# Patient Record
Sex: MALE | Race: WHITE | NOT HISPANIC OR LATINO | Employment: FULL TIME | URBAN - METROPOLITAN AREA
[De-identification: names, ages, dates, MRNs, and addresses within clinical notes are randomized per-mention and may not be internally consistent; named-entity substitution may affect disease eponyms.]

---

## 2017-12-02 ENCOUNTER — HOSPITAL ENCOUNTER (EMERGENCY)
Facility: HOSPITAL | Age: 27
Discharge: HOME | End: 2017-12-03
Attending: EMERGENCY MEDICINE | Admitting: EMERGENCY MEDICINE

## 2017-12-02 DIAGNOSIS — R41.0 CONFUSION: Primary | ICD-10-CM

## 2017-12-02 PROCEDURE — 99284 EMERGENCY DEPT VISIT MOD MDM: CPT

## 2017-12-02 RX ORDER — TESTOSTERONE CYPIONATE 200 MG/ML
INJECTION, SOLUTION INTRAMUSCULAR
COMMUNITY
End: 2017-12-15

## 2017-12-03 ENCOUNTER — APPOINTMENT (OUTPATIENT)
Dept: CT IMAGING | Facility: HOSPITAL | Age: 27
End: 2017-12-03

## 2017-12-03 VITALS
HEART RATE: 59 BPM | OXYGEN SATURATION: 95 % | RESPIRATION RATE: 18 BRPM | WEIGHT: 256 LBS | SYSTOLIC BLOOD PRESSURE: 127 MMHG | DIASTOLIC BLOOD PRESSURE: 79 MMHG | TEMPERATURE: 97.6 F | HEIGHT: 67 IN | BODY MASS INDEX: 40.18 KG/M2

## 2017-12-03 LAB
ALBUMIN SERPL-MCNC: 4.2 G/DL (ref 3.5–5.2)
ALBUMIN/GLOB SERPL: 1.7 G/DL
ALP SERPL-CCNC: 42 U/L (ref 39–117)
ALT SERPL W P-5'-P-CCNC: 19 U/L (ref 1–41)
AMPHET+METHAMPHET UR QL: NEGATIVE
ANION GAP SERPL CALCULATED.3IONS-SCNC: 10.7 MMOL/L
AST SERPL-CCNC: 16 U/L (ref 1–40)
BARBITURATES UR QL SCN: NEGATIVE
BASOPHILS # BLD AUTO: 0.14 10*3/MM3 (ref 0–0.2)
BASOPHILS NFR BLD AUTO: 1.6 % (ref 0–1.5)
BENZODIAZ UR QL SCN: NEGATIVE
BILIRUB SERPL-MCNC: 0.3 MG/DL (ref 0.1–1.2)
BILIRUB UR QL STRIP: NEGATIVE
BUN BLD-MCNC: 16 MG/DL (ref 6–20)
BUN/CREAT SERPL: 14.8 (ref 7–25)
CALCIUM SPEC-SCNC: 9 MG/DL (ref 8.6–10.5)
CANNABINOIDS SERPL QL: NEGATIVE
CHLORIDE SERPL-SCNC: 101 MMOL/L (ref 98–107)
CLARITY UR: CLEAR
CO2 SERPL-SCNC: 28.3 MMOL/L (ref 22–29)
COCAINE UR QL: NEGATIVE
COLOR UR: YELLOW
CREAT BLD-MCNC: 1.08 MG/DL (ref 0.76–1.27)
DEPRECATED RDW RBC AUTO: 43.7 FL (ref 37–54)
EOSINOPHIL # BLD AUTO: 0.39 10*3/MM3 (ref 0–0.7)
EOSINOPHIL NFR BLD AUTO: 4.4 % (ref 0.3–6.2)
ERYTHROCYTE [DISTWIDTH] IN BLOOD BY AUTOMATED COUNT: 13.1 % (ref 11.5–14.5)
GFR SERPL CREATININE-BSD FRML MDRD: 83 ML/MIN/1.73
GLOBULIN UR ELPH-MCNC: 2.5 GM/DL
GLUCOSE BLD-MCNC: 117 MG/DL (ref 65–99)
GLUCOSE UR STRIP-MCNC: NEGATIVE MG/DL
HCT VFR BLD AUTO: 44.8 % (ref 40.4–52.2)
HGB BLD-MCNC: 15 G/DL (ref 13.7–17.6)
HGB UR QL STRIP.AUTO: NEGATIVE
IMM GRANULOCYTES # BLD: 0.02 10*3/MM3 (ref 0–0.03)
IMM GRANULOCYTES NFR BLD: 0.2 % (ref 0–0.5)
INR PPP: 1.11 (ref 0.9–1.1)
KETONES UR QL STRIP: NEGATIVE
LEUKOCYTE ESTERASE UR QL STRIP.AUTO: NEGATIVE
LYMPHOCYTES # BLD AUTO: 3.03 10*3/MM3 (ref 0.9–4.8)
LYMPHOCYTES NFR BLD AUTO: 34.2 % (ref 19.6–45.3)
MCH RBC QN AUTO: 30.7 PG (ref 27–32.7)
MCHC RBC AUTO-ENTMCNC: 33.5 G/DL (ref 32.6–36.4)
MCV RBC AUTO: 91.8 FL (ref 79.8–96.2)
METHADONE UR QL SCN: NEGATIVE
MONOCYTES # BLD AUTO: 0.89 10*3/MM3 (ref 0.2–1.2)
MONOCYTES NFR BLD AUTO: 10 % (ref 5–12)
NEUTROPHILS # BLD AUTO: 4.4 10*3/MM3 (ref 1.9–8.1)
NEUTROPHILS NFR BLD AUTO: 49.6 % (ref 42.7–76)
NITRITE UR QL STRIP: NEGATIVE
OPIATES UR QL: NEGATIVE
OXYCODONE UR QL SCN: NEGATIVE
PH UR STRIP.AUTO: 7.5 [PH] (ref 5–8)
PLATELET # BLD AUTO: 205 10*3/MM3 (ref 140–500)
PMV BLD AUTO: 10.5 FL (ref 6–12)
POTASSIUM BLD-SCNC: 3.7 MMOL/L (ref 3.5–5.2)
PROT SERPL-MCNC: 6.7 G/DL (ref 6–8.5)
PROT UR QL STRIP: NEGATIVE
PROTHROMBIN TIME: 13.9 SECONDS (ref 11.7–14.2)
RBC # BLD AUTO: 4.88 10*6/MM3 (ref 4.6–6)
SODIUM BLD-SCNC: 140 MMOL/L (ref 136–145)
SP GR UR STRIP: 1.01 (ref 1–1.03)
T4 FREE SERPL-MCNC: 1.08 NG/DL (ref 0.93–1.7)
TSH SERPL DL<=0.05 MIU/L-ACNC: 4.87 MIU/ML (ref 0.27–4.2)
UROBILINOGEN UR QL STRIP: NORMAL
WBC NRBC COR # BLD: 8.87 10*3/MM3 (ref 4.5–10.7)

## 2017-12-03 PROCEDURE — 80307 DRUG TEST PRSMV CHEM ANLYZR: CPT | Performed by: EMERGENCY MEDICINE

## 2017-12-03 PROCEDURE — 84443 ASSAY THYROID STIM HORMONE: CPT | Performed by: EMERGENCY MEDICINE

## 2017-12-03 PROCEDURE — 81003 URINALYSIS AUTO W/O SCOPE: CPT | Performed by: EMERGENCY MEDICINE

## 2017-12-03 PROCEDURE — 85610 PROTHROMBIN TIME: CPT | Performed by: EMERGENCY MEDICINE

## 2017-12-03 PROCEDURE — 70450 CT HEAD/BRAIN W/O DYE: CPT

## 2017-12-03 PROCEDURE — 84439 ASSAY OF FREE THYROXINE: CPT | Performed by: EMERGENCY MEDICINE

## 2017-12-03 PROCEDURE — 85025 COMPLETE CBC W/AUTO DIFF WBC: CPT | Performed by: EMERGENCY MEDICINE

## 2017-12-03 PROCEDURE — 80053 COMPREHEN METABOLIC PANEL: CPT | Performed by: EMERGENCY MEDICINE

## 2017-12-03 NOTE — ED TRIAGE NOTES
"PT STATES HE WAS DX WITH LOW TESTOTERONE, REPORTS TODAY HE HAD TESTICLE PAIN AND ALSO REPORTS SLURRING WORDS AND \"FORGETFUL\" STARTED 1400. STATES HE MISSED TWO TURNS ON THE WAY HERE AND HE KNOWS HOW TO GET HERE.   "

## 2017-12-03 NOTE — ED PROVIDER NOTES
EMERGENCY DEPARTMENT ENCOUNTER    CHIEF COMPLAINT  Chief Complaint: speech problem  History given by: patient  History limited by: none  Room Number: 19/19  PMD: Michael Chapman MD      HPI:  Pt is a 27 y.o. male who presents complaining of intermittent slurred speech w/ testical pain that has since resolved. Pt denies testicular swelling. He has low testosterone and started injections yesterday. Denies HA, vision disturbances, changes in appetite, or hx of other endocrine related problems. He has a family hx of DM and CA.    Duration:  One day  Onset: sudden  Timing: intermittent  Location: n/a  Radiation: n/a  Quality: slurred  Intensity/Severity: moderate  Progression: resolved  Associated Symptoms: testicular pain  Aggravating Factors: none  Alleviating Factors: none  Previous Episodes: none  Treatment before arrival: none    PAST MEDICAL HISTORY  Active Ambulatory Problems     Diagnosis Date Noted   • No Active Ambulatory Problems     Resolved Ambulatory Problems     Diagnosis Date Noted   • No Resolved Ambulatory Problems     No Additional Past Medical History       PAST SURGICAL HISTORY  Past Surgical History:   Procedure Laterality Date   • ADENOIDECTOMY     • APPENDECTOMY     • TONSILLECTOMY         FAMILY HISTORY  History reviewed. No pertinent family history.    SOCIAL HISTORY  Social History     Social History   • Marital status: Single     Spouse name: N/A   • Number of children: N/A   • Years of education: N/A     Occupational History   • Not on file.     Social History Main Topics   • Smoking status: Never Smoker   • Smokeless tobacco: Not on file   • Alcohol use Yes      Comment: OCCASIONAL    • Drug use: No   • Sexual activity: Defer     Other Topics Concern   • Not on file     Social History Narrative   • No narrative on file       ALLERGIES  Review of patient's allergies indicates no known allergies.    REVIEW OF SYSTEMS  Review of Systems   Constitutional: Negative for activity change,  appetite change and fever.   HENT: Negative for congestion and sore throat.    Eyes: Negative.  Negative for visual disturbance.   Respiratory: Negative for cough and shortness of breath.    Cardiovascular: Negative for chest pain and leg swelling.   Gastrointestinal: Negative for abdominal pain, diarrhea and vomiting.   Endocrine: Negative.    Genitourinary: Positive for testicular pain. Negative for decreased urine volume and dysuria.   Musculoskeletal: Negative for neck pain.   Skin: Negative for rash and wound.   Allergic/Immunologic: Negative.    Neurological: Positive for speech difficulty. Negative for weakness, numbness and headaches.   Hematological: Negative.    Psychiatric/Behavioral: Negative.    All other systems reviewed and are negative.      PHYSICAL EXAM  ED Triage Vitals   Temp Heart Rate Resp BP SpO2   12/02/17 2330 12/02/17 2330 12/02/17 2330 12/02/17 2339 12/02/17 2330   97.5 °F (36.4 °C) 77 18 148/100 99 %      Temp src Heart Rate Source Patient Position BP Location FiO2 (%)   12/02/17 2330 12/02/17 2330 12/02/17 2339 12/02/17 2339 --   Tympanic Monitor Sitting Right arm        Physical Exam   Constitutional: He is oriented to person, place, and time and well-developed, well-nourished, and in no distress. No distress.   HENT:   Head: Normocephalic and atraumatic.   Eyes: EOM are normal. Pupils are equal, round, and reactive to light.   Neck: Normal range of motion. Neck supple.   Cardiovascular: Normal rate, regular rhythm and normal heart sounds.    Pulmonary/Chest: Effort normal and breath sounds normal. No respiratory distress.   Abdominal: Soft. There is no tenderness. There is no rebound and no guarding.   Musculoskeletal: Normal range of motion. He exhibits no edema.   Neurological: He is alert and oriented to person, place, and time. He has normal sensation and normal strength.   Skin: Skin is warm and dry.   Psychiatric: Mood and affect normal.   Nursing note and vitals  reviewed.      LAB RESULTS  Lab Results (last 24 hours)     Procedure Component Value Units Date/Time    CBC & Differential [[id number]] Collected:  12/03/17 0016    Specimen:  Blood Updated:  12/03/17 0029    Narrative:       The following orders were created for panel order CBC & Differential.  Procedure                               Abnormality         Status                     ---------                               -----------         ------                     CBC Auto Differential[[id number]]        Abnormal            Final result                 Please view results for these tests on the individual orders.    Comprehensive Metabolic Panel [[id number]]  (Abnormal) Collected:  12/03/17 0016    Specimen:  Blood Updated:  12/03/17 0048     Glucose 117 (H) mg/dL      BUN 16 mg/dL      Creatinine 1.08 mg/dL      Sodium 140 mmol/L      Potassium 3.7 mmol/L      Chloride 101 mmol/L      CO2 28.3 mmol/L      Calcium 9.0 mg/dL      Total Protein 6.7 g/dL      Albumin 4.20 g/dL      ALT (SGPT) 19 U/L      AST (SGOT) 16 U/L      Alkaline Phosphatase 42 U/L      Total Bilirubin 0.3 mg/dL      eGFR Non African Amer 83 mL/min/1.73      Globulin 2.5 gm/dL      A/G Ratio 1.7 g/dL      BUN/Creatinine Ratio 14.8     Anion Gap 10.7 mmol/L     Protime-INR [[id number]]  (Abnormal) Collected:  12/03/17 0016    Specimen:  Blood Updated:  12/03/17 0038     Protime 13.9 Seconds      INR 1.11 (H)    TSH [[id number]]  (Abnormal) Collected:  12/03/17 0016    Specimen:  Blood Updated:  12/03/17 0055     TSH 4.870 (H) mIU/mL     T4, Free [[id number]]  (Normal) Collected:  12/03/17 0016    Specimen:  Blood Updated:  12/03/17 0055     Free T4 1.08 ng/dL     Urinalysis With / Culture If Indicated - Urine, Clean Catch [[id number]]  (Normal) Collected:  12/03/17 0016    Specimen:  Urine from Urine, Clean Catch Updated:  12/03/17 0027     Color, UA Yellow     Appearance, UA Clear     pH, UA 7.5     Specific Gravity, UA 1.008     Glucose, UA Negative      Ketones, UA Negative     Bilirubin, UA Negative     Blood, UA Negative     Protein, UA Negative     Leuk Esterase, UA Negative     Nitrite, UA Negative     Urobilinogen, UA 0.2 E.U./dL    Narrative:       Urine microscopic not indicated.    Urine Drug Screen - Urine, Clean Catch [[id number]]  (Normal) Collected:  12/03/17 0016    Specimen:  Urine from Urine, Clean Catch Updated:  12/03/17 0052     Amphet/Methamphet, Screen Negative     Barbiturates Screen, Urine Negative     Benzodiazepine Screen, Urine Negative     Cocaine Screen, Urine Negative     Opiate Screen Negative     THC, Screen, Urine Negative     Methadone Screen, Urine Negative     Oxycodone Screen, Urine Negative    Narrative:       Negative Thresholds For Drugs Screened:     Amphetamines               500 ng/ml   Barbiturates               200 ng/ml   Benzodiazepines            100 ng/ml   Cocaine                    300 ng/ml   Methadone                  300 ng/ml   Opiates                    300 ng/ml   Oxycodone                  100 ng/ml   THC                        50 ng/ml    The Normal Value for all drugs tested is negative. This report includes final unconfirmed screening results to be used for medical treatment purposes only. Unconfirmed results must not be used for non-medical purposes such as employment or legal testing. Clinical consideration should be applied to any drug of abuse test, particulary when unconfirmed results are used.    CBC Auto Differential [[id number]]  (Abnormal) Collected:  12/03/17 0016    Specimen:  Blood Updated:  12/03/17 0029     WBC 8.87 10*3/mm3      RBC 4.88 10*6/mm3      Hemoglobin 15.0 g/dL      Hematocrit 44.8 %      MCV 91.8 fL      MCH 30.7 pg      MCHC 33.5 g/dL      RDW 13.1 %      RDW-SD 43.7 fl      MPV 10.5 fL      Platelets 205 10*3/mm3      Neutrophil % 49.6 %      Lymphocyte % 34.2 %      Monocyte % 10.0 %      Eosinophil % 4.4 %      Basophil % 1.6 (H) %      Immature Grans % 0.2 %       Neutrophils, Absolute 4.40 10*3/mm3      Lymphocytes, Absolute 3.03 10*3/mm3      Monocytes, Absolute 0.89 10*3/mm3      Eosinophils, Absolute 0.39 10*3/mm3      Basophils, Absolute 0.14 10*3/mm3      Immature Grans, Absolute 0.02 10*3/mm3           I ordered the above labs and reviewed the results    RADIOLOGY  CT Head Without Contrast   Preliminary Result   No definite acute intracranial pathology.                           I ordered the above noted radiological studies. Interpreted by radiologist. Reviewed by me in PACS.       PROCEDURES  Procedures      PROGRESS AND CONSULTS  ED Course   0001  CBC, UDS, UA, T4, TSH, Protime-INR, CMP, CT head ordered    0105  BP- 127/79 HR- 64 Temp- 97.5 °F (36.4 °C) (Tympanic) O2 sat- 96%  Rechecked the patient who is in NAD and is resting comfortably. Discussed imaging being unremarkable and mild elevation in the TSH. He was counseled on the need to follow up with the PCP about his sx due to possible medication side effects. He was also advised to follow up with additional thyroid studies.     MEDICAL DECISION MAKING  Results were reviewed/discussed with the patient and they were also made aware of online access. Pt also made aware that some labs, such as cultures, will not be resulted during ER visit and follow up with PMD is necessary.     MDM  Number of Diagnoses or Management Options     Amount and/or Complexity of Data Reviewed  Clinical lab tests: reviewed and ordered (TSH 4.870)  Tests in the radiology section of CPT®: reviewed and ordered (CT head-No definite acute intracranial pathology.)           DIAGNOSIS  Final diagnoses:   Confusion       DISPOSITION  DISCHARGE    Patient discharged in stable condition.    Reviewed implications of results, diagnosis, meds, responsibility to follow up, warning signs and symptoms of possible worsening, potential complications and reasons to return to ER.    Patient/Family voiced understanding of above instructions.    Discussed plan  for discharge, as there is no emergent indication for admission.  Pt/family is agreeable and understands need for follow up and repeat testing.  Pt is aware that discharge does not mean that nothing is wrong but it indicates no emergency is present that requires admission and they must continue care with follow-up as given below or physician of their choice.     FOLLOW-UP  Michael Chapman MD  [address]  [address]  [facility] [place] [address]  [phone]    Call           Medication List      Notice     No changes were made to your prescriptions during this visit.            Latest Documented Vital Signs:  As of 1:12 AM  BP- 127/79 HR- 64 Temp- 97.5 °F (36.4 °C) (Tympanic) O2 sat- 96%    --  Documentation assistance provided by mk Lindquist for Dr. Olmedo.  Information recorded by the scribe was done at my direction and has been verified and validated by me.     Radha Lindquist  12/03/17 0112       Mukund Olmedo MD  12/03/17 0619

## 2017-12-15 ENCOUNTER — OFFICE VISIT (OUTPATIENT)
Dept: INTERNAL MEDICINE | Age: 27
End: 2017-12-15

## 2017-12-15 VITALS
DIASTOLIC BLOOD PRESSURE: 70 MMHG | HEART RATE: 79 BPM | BODY MASS INDEX: 35.98 KG/M2 | OXYGEN SATURATION: 99 % | TEMPERATURE: 98.5 F | HEIGHT: 71 IN | WEIGHT: 257 LBS | SYSTOLIC BLOOD PRESSURE: 120 MMHG

## 2017-12-15 DIAGNOSIS — E53.8 VITAMIN B 12 DEFICIENCY: Chronic | ICD-10-CM

## 2017-12-15 DIAGNOSIS — F90.9 ATTENTION DEFICIT HYPERACTIVITY DISORDER (ADHD), UNSPECIFIED ADHD TYPE: Chronic | ICD-10-CM

## 2017-12-15 DIAGNOSIS — R53.83 FATIGUE, UNSPECIFIED TYPE: ICD-10-CM

## 2017-12-15 DIAGNOSIS — R79.89 LOW TESTOSTERONE IN MALE: Primary | Chronic | ICD-10-CM

## 2017-12-15 PROBLEM — H35.52 RETINITIS PIGMENTOSA OF BOTH EYES: Chronic | Status: ACTIVE | Noted: 2017-12-15

## 2017-12-15 PROBLEM — A60.00 GENITAL HERPES: Status: ACTIVE | Noted: 2017-12-15

## 2017-12-15 PROCEDURE — 99204 OFFICE O/P NEW MOD 45 MIN: CPT | Performed by: INTERNAL MEDICINE

## 2017-12-15 RX ORDER — ACYCLOVIR 200 MG/1
CAPSULE ORAL
COMMUNITY
Start: 2013-12-23 | End: 2019-01-09 | Stop reason: SDUPTHER

## 2017-12-15 RX ORDER — TESTOSTERONE CYPIONATE 200 MG/ML
200 INJECTION, SOLUTION INTRAMUSCULAR
Refills: 0 | COMMUNITY
Start: 2017-12-15 | End: 2017-12-29 | Stop reason: SDUPTHER

## 2017-12-16 ENCOUNTER — LAB (OUTPATIENT)
Dept: LAB | Facility: HOSPITAL | Age: 27
End: 2017-12-16

## 2017-12-16 DIAGNOSIS — R53.83 FATIGUE, UNSPECIFIED TYPE: Primary | ICD-10-CM

## 2017-12-16 LAB
ALBUMIN SERPL-MCNC: 4.3 G/DL (ref 3.5–5.2)
ALBUMIN/GLOB SERPL: 1.7 G/DL
ALP SERPL-CCNC: 40 U/L (ref 39–117)
ALT SERPL W P-5'-P-CCNC: 23 U/L (ref 1–41)
ANION GAP SERPL CALCULATED.3IONS-SCNC: 10.9 MMOL/L
AST SERPL-CCNC: 22 U/L (ref 1–40)
BASOPHILS # BLD AUTO: 0.15 10*3/MM3 (ref 0–0.2)
BASOPHILS NFR BLD AUTO: 1.8 % (ref 0–1.5)
BILIRUB SERPL-MCNC: 0.4 MG/DL (ref 0.1–1.2)
BILIRUB UR QL STRIP: NEGATIVE
BUN BLD-MCNC: 14 MG/DL (ref 6–20)
BUN/CREAT SERPL: 12.3 (ref 7–25)
CALCIUM SPEC-SCNC: 9.3 MG/DL (ref 8.6–10.5)
CHLORIDE SERPL-SCNC: 102 MMOL/L (ref 98–107)
CLARITY UR: CLEAR
CO2 SERPL-SCNC: 27.1 MMOL/L (ref 22–29)
COLOR UR: YELLOW
CREAT BLD-MCNC: 1.14 MG/DL (ref 0.76–1.27)
DEPRECATED RDW RBC AUTO: 44.9 FL (ref 37–54)
EOSINOPHIL # BLD AUTO: 0.35 10*3/MM3 (ref 0–0.7)
EOSINOPHIL NFR BLD AUTO: 4.3 % (ref 0.3–6.2)
ERYTHROCYTE [DISTWIDTH] IN BLOOD BY AUTOMATED COUNT: 13.4 % (ref 11.5–14.5)
FSH SERPL-ACNC: 0.27 MIU/ML
GFR SERPL CREATININE-BSD FRML MDRD: 78 ML/MIN/1.73
GLOBULIN UR ELPH-MCNC: 2.6 GM/DL
GLUCOSE BLD-MCNC: 88 MG/DL (ref 65–99)
GLUCOSE UR STRIP-MCNC: NEGATIVE MG/DL
HCT VFR BLD AUTO: 49.6 % (ref 40.4–52.2)
HGB BLD-MCNC: 16.6 G/DL (ref 13.7–17.6)
HGB UR QL STRIP.AUTO: NEGATIVE
HIV1 P24 AG SER QL: NORMAL
HIV1+2 AB SER QL: NORMAL
IMM GRANULOCYTES # BLD: 0.04 10*3/MM3 (ref 0–0.03)
IMM GRANULOCYTES NFR BLD: 0.5 % (ref 0–0.5)
KETONES UR QL STRIP: NEGATIVE
LEUKOCYTE ESTERASE UR QL STRIP.AUTO: NEGATIVE
LH SERPL-ACNC: 0.17 MIU/ML
LYMPHOCYTES # BLD AUTO: 2.39 10*3/MM3 (ref 0.9–4.8)
LYMPHOCYTES NFR BLD AUTO: 29.2 % (ref 19.6–45.3)
MCH RBC QN AUTO: 31 PG (ref 27–32.7)
MCHC RBC AUTO-ENTMCNC: 33.5 G/DL (ref 32.6–36.4)
MCV RBC AUTO: 92.7 FL (ref 79.8–96.2)
MONOCYTES # BLD AUTO: 0.45 10*3/MM3 (ref 0.2–1.2)
MONOCYTES NFR BLD AUTO: 5.5 % (ref 5–12)
NEUTROPHILS # BLD AUTO: 4.81 10*3/MM3 (ref 1.9–8.1)
NEUTROPHILS NFR BLD AUTO: 58.7 % (ref 42.7–76)
NITRITE UR QL STRIP: NEGATIVE
PH UR STRIP.AUTO: 7 [PH] (ref 5–8)
PLATELET # BLD AUTO: 217 10*3/MM3 (ref 140–500)
PMV BLD AUTO: 10.3 FL (ref 6–12)
POTASSIUM BLD-SCNC: 4.4 MMOL/L (ref 3.5–5.2)
PROLACTIN SERPL-MCNC: 11.69 NG/ML (ref 4.04–15.2)
PROT SERPL-MCNC: 6.9 G/DL (ref 6–8.5)
PROT UR QL STRIP: NEGATIVE
RBC # BLD AUTO: 5.35 10*6/MM3 (ref 4.6–6)
SODIUM BLD-SCNC: 140 MMOL/L (ref 136–145)
SP GR UR STRIP: 1.01 (ref 1–1.03)
T4 FREE SERPL-MCNC: 1.15 NG/DL (ref 0.93–1.7)
TESTOST SERPL-MCNC: 582.2 NG/DL (ref 249–836)
TSH SERPL DL<=0.05 MIU/L-ACNC: 1.64 MIU/ML (ref 0.27–4.2)
UROBILINOGEN UR QL STRIP: NORMAL
WBC NRBC COR # BLD: 8.19 10*3/MM3 (ref 4.5–10.7)

## 2017-12-16 PROCEDURE — 86664 EPSTEIN-BARR NUCLEAR ANTIGEN: CPT | Performed by: INTERNAL MEDICINE

## 2017-12-16 PROCEDURE — 84146 ASSAY OF PROLACTIN: CPT | Performed by: INTERNAL MEDICINE

## 2017-12-16 PROCEDURE — 84270 ASSAY OF SEX HORMONE GLOBUL: CPT

## 2017-12-16 PROCEDURE — 87899 AGENT NOS ASSAY W/OPTIC: CPT | Performed by: INTERNAL MEDICINE

## 2017-12-16 PROCEDURE — 36415 COLL VENOUS BLD VENIPUNCTURE: CPT

## 2017-12-16 PROCEDURE — 83002 ASSAY OF GONADOTROPIN (LH): CPT | Performed by: INTERNAL MEDICINE

## 2017-12-16 PROCEDURE — 84439 ASSAY OF FREE THYROXINE: CPT | Performed by: INTERNAL MEDICINE

## 2017-12-16 PROCEDURE — 84402 ASSAY OF FREE TESTOSTERONE: CPT

## 2017-12-16 PROCEDURE — 80307 DRUG TEST PRSMV CHEM ANLYZR: CPT | Performed by: INTERNAL MEDICINE

## 2017-12-16 PROCEDURE — 81003 URINALYSIS AUTO W/O SCOPE: CPT | Performed by: INTERNAL MEDICINE

## 2017-12-16 PROCEDURE — 84403 ASSAY OF TOTAL TESTOSTERONE: CPT

## 2017-12-16 PROCEDURE — G0432 EIA HIV-1/HIV-2 SCREEN: HCPCS | Performed by: INTERNAL MEDICINE

## 2017-12-16 PROCEDURE — 83001 ASSAY OF GONADOTROPIN (FSH): CPT | Performed by: INTERNAL MEDICINE

## 2017-12-16 PROCEDURE — 86665 EPSTEIN-BARR CAPSID VCA: CPT | Performed by: INTERNAL MEDICINE

## 2017-12-16 PROCEDURE — 84443 ASSAY THYROID STIM HORMONE: CPT | Performed by: INTERNAL MEDICINE

## 2017-12-16 PROCEDURE — G0483 DRUG TEST DEF 22+ CLASSES: HCPCS | Performed by: INTERNAL MEDICINE

## 2017-12-16 PROCEDURE — 85025 COMPLETE CBC W/AUTO DIFF WBC: CPT | Performed by: INTERNAL MEDICINE

## 2017-12-16 PROCEDURE — 80053 COMPREHEN METABOLIC PANEL: CPT | Performed by: INTERNAL MEDICINE

## 2017-12-17 LAB — SHBG SERPL-SCNC: 13.9 NMOL/L (ref 16.5–55.9)

## 2017-12-17 NOTE — ASSESSMENT & PLAN NOTE
Will check levels Saturday morning. Will include labs to evaluate for secondary hypogonadism. He will hold Depo-Testosterone shot until labs are drawn.

## 2017-12-17 NOTE — PROGRESS NOTES
"[facility] [place] [facility]  KRISTI BRAVO M.D.      Shaheen Rivera / 27 y.o. / male  12/15/2017    VITALS:    Visit Vitals   • /70 (BP Location: Left arm, Patient Position: Sitting, Cuff Size: Large Adult)   • Pulse 79   • Temp 98.5 °F (36.9 °C)   • Ht 180.3 cm (71\")   • Wt 117 kg (257 lb)   • SpO2 99%   • BMI 35.84 kg/m2       BP Readings from Last 3 Encounters:   12/15/17 120/70   12/03/17 127/79     Wt Readings from Last 3 Encounters:   12/15/17 117 kg (257 lb)   12/02/17 116 kg (256 lb)      Body mass index is 35.84 kg/(m^2).    CC: Main reason(s) for today's visit: Establish Care      HPI:    Patient is a 27 y.o. male who is here to establish care.   Primary complaints regards significant fatigue and feeling of generalized weakness. He has been lifting weights for many years but denies extensive use of androgen boosters. He has been on testosterone replacement initially with topical agent but then was switched to Depo-Testosterone injections. He reports to using weekly injections. He complains of not feeling his usual self with fatigue and his muscles do not feel strong which is a noticeable departure from norm. He was diagnosed with ADHD as a child but never medicated until college but he decided to stop on his own July 2017. He states his GF has noted he is more irritable / agitated when he is not taking the medication. He has been on B12 shots for B12 deficiency. He denies overt symptoms of depression.     Patient Care Team:  Lico Bravo MD as PCP - General (Internal Medicine)  ____________________________________________________________________    ASSESSMENT & PLAN:    Problem List Items Addressed This Visit     Low testosterone in male - Primary (Chronic)    Relevant Orders    FSH & LH (Completed)    Prolactin (Completed)    Compliance Drug Analysis, Ur - Urine, Clean Catch      Other Visit Diagnoses     Fatigue, unspecified type        Relevant Orders    Comprehensive Metabolic Panel (Completed)    CBC & " Differential (Completed)    Urinalysis With / Microscopic If Indicated - Urine, Clean Catch (Completed)    TSH+Free T4 (Completed)    EBVCA(IgG+IgM)+EBVNIg    HIV-1 / O / 2 Ag / Antibody 4th Generation (Completed)    CBC Auto Differential (Completed)           Summary/Discussion:     · Spent 50 minutes in face-to-face encounter time and >50% of time spent in discussion/counseling regarding above medical problems/issues.        Return in about 2 weeks (around 12/29/2017) for Next scheduled follow up.    Future Appointments  Date Time Provider Department Center   12/29/2017 4:40 PM MD [facility] None       ____________________________________________________________________        REVIEW OF SYSTEMS    Review of Systems   Constitutional: Positive for fatigue. Negative for unexpected weight change.   HENT: Negative.    Eyes: Negative.    Respiratory: Negative.  Apnea: snores.    Cardiovascular: Negative.  Negative for chest pain.   Gastrointestinal: Negative.    Endocrine: Negative.         Low testosterone  Decreased libido   Genitourinary: Negative.    Musculoskeletal: Negative.    Skin: Negative.    Allergic/Immunologic: Negative.    Neurological: Negative.    Hematological: Negative.    Psychiatric/Behavioral: Positive for agitation, decreased concentration (h/o ADHD) and dysphoric mood. Negative for suicidal ideas.         PHYSICAL EXAMINATION    Physical Exam   Constitutional: He appears well-nourished. No distress.   Overweight     HENT:   Head: Normocephalic.   Right Ear: Tympanic membrane normal.   Left Ear: Tympanic membrane normal.   Mouth/Throat: Oropharynx is clear and moist. No oral lesions.   Eyes: Conjunctivae and EOM are normal. Pupils are equal, round, and reactive to light. No scleral icterus.   Neck: Neck supple. No tracheal deviation present. No thyromegaly present.   Cardiovascular: Normal rate, regular rhythm, normal heart sounds and intact distal pulses.  Exam reveals no gallop and  no friction rub.    No murmur heard.  Pulmonary/Chest: Effort normal and breath sounds normal.   Abdominal: Soft. Bowel sounds are normal. He exhibits no distension and no mass. There is no tenderness. No hernia.   Musculoskeletal: He exhibits no edema or deformity.   Lymphadenopathy:     He has no cervical adenopathy.        Right: No supraclavicular adenopathy present.        Left: No supraclavicular adenopathy present.   Neurological: He is alert. He has normal reflexes. He exhibits normal muscle tone.   Skin: Skin is intact. No rash noted. No erythema. No pallor.   No jaundice   Psychiatric: Judgment and thought content normal. Anxious: concerned. His speech is delayed. He is slowed. He is not agitated. Cognition and memory are normal. He exhibits a depressed mood (moderately dysphorc). He expresses no suicidal ideation.       REVIEWED DATA:    Labs:        Imaging:        Medical Tests:        Summary of old records / correspondence / consultant report:        Request outside records:        ______________________________________________________________________    ALLERGIES  No Known Allergies     MEDICATIONS  Current Outpatient Prescriptions   Medication Sig Dispense Refill   • acyclovir (ZOVIRAX) 200 MG capsule TAKE ONE CAPSULE BY MOUTH EVERY 4 HOURS     • Cyanocobalamin (B-12 COMPLIANCE INJECTION) 1000 MCG/ML kit Inject  as directed.     • FOLIC ACID PO Take  by mouth.     • Testosterone Cypionate (DEPO-TESTOSTERONE) 200 MG/ML injection Inject 1 mL into the shoulder, thigh, or buttocks Every 28 (Twenty-Eight) Days.  0     No current facility-administered medications for this visit.        PFSH:     The following portions of the patient's history were reviewed and updated as appropriate: Allergies / Current Medications / Past Medical History / Surgical History / Social History / Family History    PROBLEM LIST   Patient Active Problem List   Diagnosis   • Vitamin B 12 deficiency   • Low testosterone in male    • Retinitis pigmentosa of both eyes   • ADHD (attention deficit hyperactivity disorder)   • Genital herpes       PAST MEDICAL HISTORY  Past Medical History:   Diagnosis Date   • ADHD    • HSV-2 (herpes simplex virus 2) infection    • Low testosterone in male    • Vitamin B 12 deficiency        SURGICAL HISTORY  Past Surgical History:   Procedure Laterality Date   • ADENOIDECTOMY     • APPENDECTOMY     • TONSILLECTOMY         SOCIAL HISTORY  Social History     Social History   • Marital status: Single     Spouse name: has a fiance   • Number of children: 0   • Years of education: N/A     Occupational History   • 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[employer])      Social History Main Topics   • Smoking status: Never Smoker   • Smokeless tobacco: Never Used   • Alcohol use Yes      Comment: OCCASIONAL    • Drug use: No      Comment: former casual marijuana use (more than 2 years ago)   • Sexual activity: Not Currently     Other Topics Concern   • None     Social History Narrative   • None       FAMILY HISTORY  Family History   Problem Relation Age of Onset   • Lung cancer Mother 49     smoker   • Graves' disease Mother    • Lung cancer Father 52     smoker   • No Known Problems Brother    • Coronary artery disease Paternal Grandmother    • Thyroid disease Paternal Grandmother    • Suicidality Maternal Uncle      committed suicide         **Daly Disclaimer:   Much of this encounter note is an electronic transcription/translation of spoken language to printed text. The electronic translation of spoken language may permit erroneous, or at times, nonsensical words or phrases to be inadvertently transcribed. Although I have reviewed the note for such errors, some may still exist.

## 2017-12-18 LAB
EBV NA IGG SER IA-ACNC: 129 U/ML (ref 0–17.9)
EBV VCA IGG SER-ACNC: 550 U/ML (ref 0–17.9)
EBV VCA IGM SER-ACNC: <36 U/ML (ref 0–35.9)
INTERPRETATION: ABNORMAL

## 2017-12-19 ENCOUNTER — TELEPHONE (OUTPATIENT)
Dept: INTERNAL MEDICINE | Age: 27
End: 2017-12-19

## 2017-12-19 LAB — TESTOST FREE SERPL-MCNC: 24.7 PG/ML (ref 9.3–26.5)

## 2017-12-20 NOTE — TELEPHONE ENCOUNTER
Discussed w/ him re: labs. Low fsh/lh is probably related to exogenous testosterone. He may resume testosterone injections or hold at this time. He was told to call if he any acute worsening of his symptoms. He expressed understanding and agreed to follow above instructions.

## 2017-12-22 LAB — CONV REPORT SUMMARY: NORMAL

## 2017-12-29 ENCOUNTER — OFFICE VISIT (OUTPATIENT)
Dept: INTERNAL MEDICINE | Age: 27
End: 2017-12-29

## 2017-12-29 VITALS
WEIGHT: 260 LBS | SYSTOLIC BLOOD PRESSURE: 124 MMHG | TEMPERATURE: 98.4 F | DIASTOLIC BLOOD PRESSURE: 72 MMHG | HEIGHT: 71 IN | HEART RATE: 72 BPM | OXYGEN SATURATION: 99 % | BODY MASS INDEX: 36.4 KG/M2

## 2017-12-29 DIAGNOSIS — R53.83 FATIGUE, UNSPECIFIED TYPE: ICD-10-CM

## 2017-12-29 DIAGNOSIS — Z00.00 PREVENTATIVE HEALTH CARE: Primary | ICD-10-CM

## 2017-12-29 DIAGNOSIS — R79.89 LOW TESTOSTERONE IN MALE: Primary | Chronic | ICD-10-CM

## 2017-12-29 PROCEDURE — 90686 IIV4 VACC NO PRSV 0.5 ML IM: CPT | Performed by: INTERNAL MEDICINE

## 2017-12-29 PROCEDURE — 99213 OFFICE O/P EST LOW 20 MIN: CPT | Performed by: INTERNAL MEDICINE

## 2017-12-29 PROCEDURE — 90471 IMMUNIZATION ADMIN: CPT | Performed by: INTERNAL MEDICINE

## 2017-12-29 RX ORDER — SYRINGE W-NEEDLE,DISPOSAB,3 ML 25GX5/8"
SYRINGE, EMPTY DISPOSABLE MISCELLANEOUS
Qty: 50 EACH | Refills: 0 | Status: SHIPPED | OUTPATIENT
Start: 2017-12-29 | End: 2018-03-28

## 2017-12-29 RX ORDER — TESTOSTERONE CYPIONATE 200 MG/ML
200 INJECTION, SOLUTION INTRAMUSCULAR WEEKLY
Qty: 4 ML | Refills: 0 | Status: SHIPPED | OUTPATIENT
Start: 2017-12-29 | End: 2018-01-24 | Stop reason: SDUPTHER

## 2017-12-29 NOTE — ASSESSMENT & PLAN NOTE
Feeling better. Continue testosterone injection weekly x 1 month then will recheck levels. Referral to endo for further management as he is a young male with plans to  and have children at some point. Rx printed for testosterone 1 mL x 4, no refill. Consent / agreement signed. Brown requested. UDS reviewed.

## 2017-12-29 NOTE — PROGRESS NOTES
"[facility] [place] INTERNAL MEDICINE  KRISTI BRAVO M.D.      Shaheen Gilletteoney / 27 y.o. / male  12/29/2017      MEDICATIONS  Current Outpatient Prescriptions   Medication Sig Dispense Refill   • acyclovir (ZOVIRAX) 200 MG capsule TAKE ONE CAPSULE BY MOUTH EVERY 4 HOURS     • Cyanocobalamin (B-12 COMPLIANCE INJECTION) 1000 MCG/ML kit Inject  as directed.     • FOLIC ACID PO Take  by mouth.     • Testosterone Cypionate (DEPO-TESTOSTERONE) 200 MG/ML injection Inject 1 mL into the shoulder, thigh, or buttocks 1 (One) Time Per Week. 4 mL 0   • Syringe 23G X 1\" 3 ML misc Use weekly for testosterone injection 50 each 0     No current facility-administered medications for this visit.          VITALS:    Visit Vitals   • /72   • Pulse 72   • Temp 98.4 °F (36.9 °C)   • Ht 180.3 cm (70.98\")   • Wt 118 kg (260 lb)   • SpO2 99%   • BMI 36.28 kg/m2       BP Readings from Last 3 Encounters:   12/29/17 124/72   12/15/17 120/70   12/03/17 127/79     Wt Readings from Last 3 Encounters:   12/29/17 118 kg (260 lb)   12/15/17 117 kg (257 lb)   12/02/17 116 kg (256 lb)      Body mass index is 36.28 kg/(m^2).    CC:  Main reason(s) for today's visit: Low testerone (2 week F/U)      HPI:     Overall feel better. Resumed testosterone injection weekly at 200 mg/mL. C/o low libido, has a fiance and plans to  and have children at some point. Previously stopped taking Vyvanse abruptly which contributed to severe fatigue symtpoms. All other labs including thyroid function, cbc were fine.     Patient Care Team:  Lico Bravo MD as PCP - General (Internal Medicine)  ____________________________________________________________________    ASSESSMENT & PLAN:    Problem List Items Addressed This Visit     Low testosterone in male - Primary (Chronic)    Current Assessment & Plan     Feeling better. Continue testosterone injection weekly x 1 month then will recheck levels. Referral to endo for further management as he is a young male with plans to  and " "have children at some point. Rx printed for testosterone 1 mL x 4, no refill. Consent / agreement signed. Brown requested. UDS reviewed.          Relevant Medications    Testosterone Cypionate (DEPO-TESTOSTERONE) 200 MG/ML injection    Syringe 23G X 1\" 3 ML misc    Other Relevant Orders    Ambulatory Referral to Endocrinology    Fatigue    Current Assessment & Plan     Improving. Suspect related to combination of low testosterone and stopping Vyvanse abruptly.              Orders Placed This Encounter   Procedures   • Ambulatory Referral to Endocrinology       Summary/Discussion:  ·     Return in about 2 months (around 2/28/2018) for Low testosterone.    Future Appointments  Date Time Provider Department Center   2/28/2018 2:40 PM MD [facility] None       ____________________________________________________________________    REVIEW OF SYSTEMS    Review of Systems  Constitutional decreased fatigue  Resp neg  CV neg  Poor libido  No mood worsening, SI, agitation      PHYSICAL EXAMINATION    Physical Exam  Constitutional  No distress, looks improved  Psychiatric  Alert. Judgment and thought content normal. Mood normal    REVIEWED DATA:    Labs:     Lab on 12/16/2017   Component Date Value Ref Range Status   • Testosterone, Total 12/16/2017 582.20  249.00 - 836.00 ng/dL Final   • Testosterone, Free 12/16/2017 24.7  9.3 - 26.5 pg/mL Final   • Sex Hormone Binding Globulin 12/16/2017 13.9* 16.5 - 55.9 nmol/L Final   Office Visit on 12/15/2017   Component Date Value Ref Range Status   • Glucose 12/16/2017 88  65 - 99 mg/dL Final   • BUN 12/16/2017 14  6 - 20 mg/dL Final   • Creatinine 12/16/2017 1.14  0.76 - 1.27 mg/dL Final   • Sodium 12/16/2017 140  136 - 145 mmol/L Final   • Potassium 12/16/2017 4.4  3.5 - 5.2 mmol/L Final   • Chloride 12/16/2017 102  98 - 107 mmol/L Final   • CO2 12/16/2017 27.1  22.0 - 29.0 mmol/L Final   • Calcium 12/16/2017 9.3  8.6 - 10.5 mg/dL Final   • Total Protein 12/16/2017 6.9  " 6.0 - 8.5 g/dL Final   • Albumin 12/16/2017 4.30  3.50 - 5.20 g/dL Final   • ALT (SGPT) 12/16/2017 23  1 - 41 U/L Final   • AST (SGOT) 12/16/2017 22  1 - 40 U/L Final   • Alkaline Phosphatase 12/16/2017 40  39 - 117 U/L Final   • Total Bilirubin 12/16/2017 0.4  0.1 - 1.2 mg/dL Final   • eGFR Non African Amer 12/16/2017 78  >60 mL/min/1.73 Final   • Globulin 12/16/2017 2.6  gm/dL Final   • A/G Ratio 12/16/2017 1.7  g/dL Final   • BUN/Creatinine Ratio 12/16/2017 12.3  7.0 - 25.0 Final   • Anion Gap 12/16/2017 10.9  mmol/L Final   • Color, UA 12/16/2017 Yellow  Yellow, Straw Final   • Appearance, UA 12/16/2017 Clear  Clear Final   • pH, UA 12/16/2017 7.0  5.0 - 8.0 Final   • Specific Gravity, UA 12/16/2017 1.007  1.005 - 1.030 Final   • Glucose, UA 12/16/2017 Negative  Negative Final   • Ketones, UA 12/16/2017 Negative  Negative Final   • Bilirubin, UA 12/16/2017 Negative  Negative Final   • Blood, UA 12/16/2017 Negative  Negative Final   • Protein, UA 12/16/2017 Negative  Negative Final   • Leuk Esterase, UA 12/16/2017 Negative  Negative Final   • Nitrite, UA 12/16/2017 Negative  Negative Final   • Urobilinogen, UA 12/16/2017 0.2 E.U./dL  0.2 - 1.0 E.U./dL Final   • TSH 12/16/2017 1.640  0.270 - 4.200 mIU/mL Final   • Free T4 12/16/2017 1.15  0.93 - 1.70 ng/dL Final   • FSH 12/16/2017 0.27  mIU/mL Final   • LH 12/16/2017 0.17  mIU/mL Final   • Prolactin 12/16/2017 11.69  4.04 - 15.20 ng/mL Final   • Report Summary 12/16/2017 FINAL   Final    ====================================================================  [facility] COMP DRUG ANALYSIS,[facility]  ====================================================================  Test                             Result       Flag       Units    NO DRUGS DETECTED.  ====================================================================  Test                      Result    Flag   Units      Ref Range    Creatinine              33               mg/dL       >=20  ====================================================================  Declared Medications:   Medication list was not provided.  ====================================================================  For clinical consultation, please call [phone].  ====================================================================   • EBV VCA IgM 12/16/2017 <36.0  0.0 - 35.9 U/mL Final                                     Negative        <36.0                                   Equivocal 36.0 - 43.9                                   Positive        >43.9   • EBV VCA IgG 12/16/2017 550.0* 0.0 - 17.9 U/mL Final                                     Negative        <18.0                                   Equivocal 18.0 - 21.9                                   Positive        >21.9   • EBV Nuclear Antigen Ab, IgG 12/16/2017 129.0* 0.0 - 17.9 U/mL Final                                     Negative        <18.0                                   Equivocal 18.0 - 21.9                                   Positive        >21.9   • Interpretation 12/16/2017 Comment   Final                   EBV Interpretation Chart  Interpretation   EBV-IgM  EA(D)-IgG  VCA-IgG  EBNA-IgG  EBV Seronegative    -        -         -          -  Early Phase         +        -         -          -  Acute Primary       +       +or-       +          -  Infection  Convalescence/Past  -       +or-       +          +  Infection  Reactivated        +or-      +         +          +  Infection         + Antibody Present      - Antibody Absent   • HIV-1/ HIV-2 12/16/2017 Non-Reactive  Non-Reactive Final   • HIV-1 P24 Ag Screen 12/16/2017 Non-Reactive  Non-Reactive Final   • WBC 12/16/2017 8.19  4.50 - 10.70 10*3/mm3 Final   • RBC 12/16/2017 5.35  4.60 - 6.00 10*6/mm3 Final   • Hemoglobin 12/16/2017 16.6  13.7 - 17.6 g/dL Final   • Hematocrit 12/16/2017 49.6  40.4 - 52.2 % Final   • MCV 12/16/2017 92.7  79.8 - 96.2 fL Final   • MCH 12/16/2017 31.0  27.0 - 32.7 pg Final    • MCHC 12/16/2017 33.5  32.6 - 36.4 g/dL Final   • RDW 12/16/2017 13.4  11.5 - 14.5 % Final   • RDW-SD 12/16/2017 44.9  37.0 - 54.0 fl Final   • MPV 12/16/2017 10.3  6.0 - 12.0 fL Final   • Platelets 12/16/2017 217  140 - 500 10*3/mm3 Final   Admission on 12/02/2017, Discharged on 12/03/2017   Component Date Value Ref Range Status   • Glucose 12/03/2017 117* 65 - 99 mg/dL Final   • BUN 12/03/2017 16  6 - 20 mg/dL Final   • Creatinine 12/03/2017 1.08  0.76 - 1.27 mg/dL Final   • Sodium 12/03/2017 140  136 - 145 mmol/L Final   • Potassium 12/03/2017 3.7  3.5 - 5.2 mmol/L Final   • Chloride 12/03/2017 101  98 - 107 mmol/L Final   • CO2 12/03/2017 28.3  22.0 - 29.0 mmol/L Final   • Calcium 12/03/2017 9.0  8.6 - 10.5 mg/dL Final   • Total Protein 12/03/2017 6.7  6.0 - 8.5 g/dL Final   • Albumin 12/03/2017 4.20  3.50 - 5.20 g/dL Final   • ALT (SGPT) 12/03/2017 19  1 - 41 U/L Final   • AST (SGOT) 12/03/2017 16  1 - 40 U/L Final   • Alkaline Phosphatase 12/03/2017 42  39 - 117 U/L Final   • Total Bilirubin 12/03/2017 0.3  0.1 - 1.2 mg/dL Final   • eGFR Non African Amer 12/03/2017 83  >60 mL/min/1.73 Final   • Globulin 12/03/2017 2.5  gm/dL Final   • A/G Ratio 12/03/2017 1.7  g/dL Final   • BUN/Creatinine Ratio 12/03/2017 14.8  7.0 - 25.0 Final   • Anion Gap 12/03/2017 10.7  mmol/L Final   • Protime 12/03/2017 13.9  11.7 - 14.2 Seconds Final   • INR 12/03/2017 1.11* 0.90 - 1.10 Final   • TSH 12/03/2017 4.870* 0.270 - 4.200 mIU/mL Final   • Free T4 12/03/2017 1.08  0.93 - 1.70 ng/dL Final   • Color, UA 12/03/2017 Yellow  Yellow, Straw Final   • Appearance, UA 12/03/2017 Clear  Clear Final   • pH, UA 12/03/2017 7.5  5.0 - 8.0 Final   • Specific Gravity, UA 12/03/2017 1.008  1.005 - 1.030 Final   • Glucose, UA 12/03/2017 Negative  Negative Final   • Ketones, UA 12/03/2017 Negative  Negative Final   • Bilirubin, UA 12/03/2017 Negative  Negative Final   • Blood, UA 12/03/2017 Negative  Negative Final   • Protein, UA 12/03/2017  Negative  Negative Final   • Leuk Esterase, UA 12/03/2017 Negative  Negative Final   • Nitrite, UA 12/03/2017 Negative  Negative Final   • Urobilinogen, UA 12/03/2017 0.2 E.U./dL  0.2 - 1.0 E.U./dL Final   • Amphet/Methamphet, Screen 12/03/2017 Negative  Negative Final   • Barbiturates Screen, Urine 12/03/2017 Negative  Negative Final   • Benzodiazepine Screen, Urine 12/03/2017 Negative  Negative Final   • Cocaine Screen, Urine 12/03/2017 Negative  Negative Final   • Opiate Screen 12/03/2017 Negative  Negative Final   • THC, Screen, Urine 12/03/2017 Negative  Negative Final   • Methadone Screen, Urine 12/03/2017 Negative  Negative Final   • Oxycodone Screen, Urine 12/03/2017 Negative  Negative Final   • WBC 12/03/2017 8.87  4.50 - 10.70 10*3/mm3 Final   • RBC 12/03/2017 4.88  4.60 - 6.00 10*6/mm3 Final   • Hemoglobin 12/03/2017 15.0  13.7 - 17.6 g/dL Final   • Hematocrit 12/03/2017 44.8  40.4 - 52.2 % Final   • MCV 12/03/2017 91.8  79.8 - 96.2 fL Final   • MCH 12/03/2017 30.7  27.0 - 32.7 pg Final   • MCHC 12/03/2017 33.5  32.6 - 36.4 g/dL Final   • RDW 12/03/2017 13.1  11.5 - 14.5 % Final   • RDW-SD 12/03/2017 43.7  37.0 - 54.0 fl Final   • MPV 12/03/2017 10.5  6.0 - 12.0 fL Final   • Platelets 12/03/2017 205  140 - 500 10*3/mm3 Final                     Imaging:         Medical Tests:         Summary of old records / correspondence / consultant report:         Request outside records:         ALLERGIES  No Known Allergies     PFSH:     The following portions of the patient's history were reviewed and updated as appropriate: Allergies / Current Medications / Past Medical History / Surgical History / Social History / Family History    PROBLEM LIST   Patient Active Problem List   Diagnosis   • Vitamin B 12 deficiency   • Low testosterone in male   • Retinitis pigmentosa of both eyes   • ADHD (attention deficit hyperactivity disorder)   • Genital herpes   • Fatigue       PAST MEDICAL HISTORY  Past Medical History:    Diagnosis Date   • ADHD    • HSV-2 (herpes simplex virus 2) infection    • Low testosterone in male    • Vitamin B 12 deficiency        SURGICAL HISTORY  Past Surgical History:   Procedure Laterality Date   • ADENOIDECTOMY     • APPENDECTOMY     • TONSILLECTOMY         SOCIAL HISTORY  Social History     Social History   • Marital status: Single     Spouse name: has a fiance   • Number of children: 0   • Years of education: N/A     Occupational History   • 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[employer]      Social History Main Topics   • Smoking status: Never Smoker   • Smokeless tobacco: Never Used   • Alcohol use Yes      Comment: OCCASIONAL    • Drug use: No      Comment: former casual marijuana use (more than 2 years ago)   • Sexual activity: Not Currently     Other Topics Concern   • None     Social History Narrative       FAMILY HISTORY  Family History   Problem Relation Age of Onset   • Lung cancer Mother 49     smoker   • Graves' disease Mother    • Lung cancer Father 52     smoker   • No Known Problems Brother    • Coronary artery disease Paternal Grandmother    • Thyroid disease Paternal Grandmother    • Suicidality Maternal Uncle      committed suicide         **Daly Disclaimer:   Much of this encounter note is an electronic transcription/translation of spoken language to printed text. The electronic translation of spoken language may permit erroneous, or at times, nonsensical words or phrases to be inadvertently transcribed. Although I have reviewed the note for such errors, some may still exist.

## 2018-01-04 ENCOUNTER — TELEPHONE (OUTPATIENT)
Dept: INTERNAL MEDICINE | Age: 28
End: 2018-01-04

## 2018-01-04 NOTE — TELEPHONE ENCOUNTER
There is a referral paced to a Endocrinology and the DR that you have suggested is booked out until April, is it ok if I find another provider, or do you prefer I keep it with the provider JESS LOU, please advise.  Thanks

## 2018-01-24 DIAGNOSIS — R79.89 LOW TESTOSTERONE IN MALE: Chronic | ICD-10-CM

## 2018-01-24 RX ORDER — TESTOSTERONE CYPIONATE 200 MG/ML
200 INJECTION, SOLUTION INTRAMUSCULAR WEEKLY
Qty: 4 ML | Refills: 0 | OUTPATIENT
Start: 2018-01-24 | End: 2018-02-09

## 2018-02-09 ENCOUNTER — OFFICE VISIT (OUTPATIENT)
Dept: ENDOCRINOLOGY | Age: 28
End: 2018-02-09

## 2018-02-09 VITALS
DIASTOLIC BLOOD PRESSURE: 90 MMHG | BODY MASS INDEX: 36.96 KG/M2 | SYSTOLIC BLOOD PRESSURE: 140 MMHG | HEIGHT: 71 IN | OXYGEN SATURATION: 97 % | HEART RATE: 73 BPM | WEIGHT: 264 LBS

## 2018-02-09 DIAGNOSIS — R79.89 LOW TESTOSTERONE IN MALE: ICD-10-CM

## 2018-02-09 DIAGNOSIS — R53.82 CHRONIC FATIGUE: Primary | ICD-10-CM

## 2018-02-09 PROBLEM — A60.00 GENITAL HERPES: Status: RESOLVED | Noted: 2017-12-15 | Resolved: 2018-02-09

## 2018-02-09 PROCEDURE — 99244 OFF/OP CNSLTJ NEW/EST MOD 40: CPT | Performed by: INTERNAL MEDICINE

## 2018-02-09 NOTE — PROGRESS NOTES
Chief Complaint   Patient presents with   • Abnormal Lab     LOW TESTOSTERONE   NEW PATIENT APPOINTMENT/ LOW TESTOSTERONE    Shaheen Rivera,27 y.o. WM is here as new pt for low testosterone levels. Consulted by Dr. Golden.     Pt's PCP checked his testosterone levels 3 - 4 months ago due to his c/o fatigue for about a year, c/o weight gain, inability to lose weight, low sex drive. Based on BW due to the low testosterone levels pt was started on androgel and when levels were not getting better he was started on im testosterone.   Currently on testosterone im injections for about 10 weeks now, takes 1 ml - 200 mg once a week.     He still c/o fatigue today in the clinic. Reports that the fatigue is affecting his daily activities. Reports still decreased libido, decreased erections, decreased morning erections, premature ejaculations. No change in shaving frequency. Attained puberty at an earlier age as his peers. He has no kids of his own. He is in a serious relationship at this time but not planning pregnancy at this time.   No TBI, no hx of mumps, no injury to testes.   Currently not on any opiates, steroids, not on any diet supplements for muscle building.   Gained about 20 pounds of weight in the last 4 months.     Pt c/o some headaches, no nipple discharge but some breast enlargement. No temporal loss of vision and not on atypical anti-psychotics.  No c/o increased urination, urgency or hematuria.   Never been diagnosed with sleep apnea, snores a little in the middle of his sleep.     I have reviewed the patient's allergies, medicines, past medical hx, family hx and social hx in detail.    Past Medical History:   Diagnosis Date   • ADHD    • HSV-2 (herpes simplex virus 2) infection    • Low testosterone in male    • Vitamin B 12 deficiency        Family History   Problem Relation Age of Onset   • Lung cancer Mother 49     smoker   • Graves' disease Mother    • Lung cancer Father 52     smoker   • No Known Problems  "Brother    • Coronary artery disease Paternal Grandmother    • Thyroid disease Paternal Grandmother    • Suicidality Maternal Uncle      committed suicide       Social History     Social History   • Marital status: Single     Spouse name: has a fiance   • Number of children: 0   • Years of education: N/A     Occupational History   • 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[employer])      Social History Main Topics   • Smoking status: Never Smoker   • Smokeless tobacco: Never Used   • Alcohol use Yes      Comment: OCCASIONAL    • Drug use: No      Comment: former casual marijuana use (more than 2 years ago)   • Sexual activity: Not Currently     Other Topics Concern   • Not on file     Social History Narrative       No Known Allergies      Current Outpatient Prescriptions:   •  acyclovir (ZOVIRAX) 200 MG capsule, TAKE ONE CAPSULE BY MOUTH EVERY 4 HOURS, Disp: , Rfl:   •  Cyanocobalamin (B-12 COMPLIANCE INJECTION) 1000 MCG/ML kit, Inject  as directed., Disp: , Rfl:   •  FOLIC ACID PO, Take  by mouth., Disp: , Rfl:   •  Syringe 23G X 1\" 3 ML misc, Use weekly for testosterone injection, Disp: 50 each, Rfl: 0    Review of Systems   Constitutional: Positive for fatigue and unexpected weight change. Negative for fever.   HENT: Negative for facial swelling, nosebleeds, trouble swallowing and voice change.    Eyes: Positive for photophobia. Negative for pain and redness.   Respiratory: Negative for shortness of breath and wheezing.    Cardiovascular: Negative for palpitations and leg swelling.   Gastrointestinal: Negative for abdominal pain, diarrhea and vomiting.   Endocrine: Positive for cold intolerance. Negative for polydipsia and polyuria.   Genitourinary: Negative for decreased urine volume and frequency.   Musculoskeletal: Negative for joint swelling and neck pain.   Skin: Negative for rash.   Allergic/Immunologic: Negative for immunocompromised state.   Neurological: Positive for weakness and headaches. Negative for seizures and facial " "asymmetry.   Hematological: Does not bruise/bleed easily.   Psychiatric/Behavioral: Positive for agitation, decreased concentration, dysphoric mood and sleep disturbance. Negative for confusion.       Objective:    /90  Pulse 73  Ht 180.3 cm (71\")  Wt 120 kg (264 lb)  SpO2 97%  BMI 36.82 kg/m2    Physical Exam   Constitutional: He is oriented to person, place, and time. He appears well-nourished.   Obese male   HENT:   Head: Normocephalic and atraumatic.   Eyes: Conjunctivae and EOM are normal.   Neck: Normal range of motion. Neck supple. Carotid bruit is not present. No thyromegaly present.   Cardiovascular: Normal rate and normal heart sounds.    Pulmonary/Chest: Effort normal and breath sounds normal. No stridor. No respiratory distress. He has no wheezes.   Abdominal: Soft. Bowel sounds are normal. He exhibits no distension. There is no tenderness.   Central obesity   Musculoskeletal: He exhibits no edema or tenderness.   Lymphadenopathy:     He has no cervical adenopathy.   Neurological: He is alert and oriented to person, place, and time.   Skin: Skin is warm and dry.   Psychiatric: He has a normal mood and affect. His behavior is normal.   Vitals reviewed.      Results Review:    I reviewed the patient's new clinical results.    Lab on 12/16/2017   Component Date Value Ref Range Status   • Testosterone, Total 12/16/2017 582.20  249.00 - 836.00 ng/dL Final   • Testosterone, Free 12/16/2017 24.7  9.3 - 26.5 pg/mL Final   • Sex Hormone Binding Globulin 12/16/2017 13.9* 16.5 - 55.9 nmol/L Final       Shaheen was seen today for abnormal lab.    Diagnoses and all orders for this visit:    Chronic fatigue  -     Iron and TIBC; Future  -     Ferritin; Future  -     PSA DIAGNOSTIC; Future  -     Prolactin; Future  -     FSH & LH; Future  -     TestT+TestF+SHBG; Future  -     TSH; Future  -     T4, Free; Future  -     Hemoglobin A1c; Future  -     Vitamin B12 & Folate; Future  -     Vitamin D 25 Hydroxy; " Future    Low testosterone in male  -     Iron and TIBC; Future  -     Ferritin; Future  -     PSA DIAGNOSTIC; Future  -     Prolactin; Future  -     FSH & LH; Future  -     TestT+TestF+SHBG; Future  -     TSH; Future  -     T4, Free; Future  -     Hemoglobin A1c; Future  -     Vitamin B12 & Folate; Future  -     Vitamin D 25 Hydroxy; Future    Low testosterone levels  Patient testosterone levels are within normal limits on the current testosterone replacement  He is also noted to have low SHBG levels which could be related to obesity  Patient could have a normal free testosterone levels and his total testosterone level could be low due to the low SHBG secondary to obesity  There is no way to establish when pt is on testosterone replacement.  Counseled the patient that we need to discontinue the testosterone and he needs to be off it for at least 6 weeks before we repeat the blood work up.  Will repeat thee entire testosterone panel  Will rule out hemachromatosis  Will check prolactin levels.  Discussed with the patient that he needs a sleep study before the consideration of testosterone replacement as testosterone will worsen sleep apnea  Educated the patient the testosterone was also result in cardiovascular side effects.  Discussed with the patient that since he is 27 years old and since he does not have any kids if he is started on testosterone it would make him infertile.     Thank you for asking me to see your patient, Shaheen Rivera in consultation.    35 minutes out of 60 minutes face to face spent in counseling the patient extensively on work up and further plans.    Kellie Leyva MD  02/09/18

## 2018-03-12 ENCOUNTER — LAB (OUTPATIENT)
Dept: LAB | Facility: HOSPITAL | Age: 28
End: 2018-03-12

## 2018-03-12 DIAGNOSIS — R53.82 CHRONIC FATIGUE: Primary | ICD-10-CM

## 2018-03-12 DIAGNOSIS — R79.89 LOW TESTOSTERONE IN MALE: Chronic | ICD-10-CM

## 2018-03-12 LAB
25(OH)D3 SERPL-MCNC: 29.5 NG/ML (ref 30–100)
FERRITIN SERPL-MCNC: 147.9 NG/ML (ref 30–400)
FOLATE SERPL-MCNC: 15.11 NG/ML (ref 4.78–24.2)
FSH SERPL-ACNC: 4.76 MIU/ML
HBA1C MFR BLD: 4.95 % (ref 4.8–5.6)
IRON 24H UR-MRATE: 164 MCG/DL (ref 59–158)
IRON SATN MFR SERPL: 52 % (ref 20–50)
LH SERPL-ACNC: 3.92 MIU/ML
PROLACTIN SERPL-MCNC: 6.71 NG/ML (ref 4.04–15.2)
PSA SERPL-MCNC: 0.67 NG/ML (ref 0–4)
T4 FREE SERPL-MCNC: 1.09 NG/DL (ref 0.93–1.7)
TESTOST SERPL-MCNC: 321.5 NG/DL (ref 249–836)
TIBC SERPL-MCNC: 316 MCG/DL (ref 298–536)
TRANSFERRIN SERPL-MCNC: 212 MG/DL (ref 200–360)
TSH SERPL DL<=0.05 MIU/L-ACNC: 2.52 MIU/ML (ref 0.27–4.2)
VIT B12 BLD-MCNC: 1009 PG/ML (ref 211–946)

## 2018-03-12 PROCEDURE — 84439 ASSAY OF FREE THYROXINE: CPT

## 2018-03-12 PROCEDURE — 83540 ASSAY OF IRON: CPT

## 2018-03-12 PROCEDURE — 84403 ASSAY OF TOTAL TESTOSTERONE: CPT

## 2018-03-12 PROCEDURE — 82607 VITAMIN B-12: CPT

## 2018-03-12 PROCEDURE — 82306 VITAMIN D 25 HYDROXY: CPT

## 2018-03-12 PROCEDURE — 83002 ASSAY OF GONADOTROPIN (LH): CPT

## 2018-03-12 PROCEDURE — 84270 ASSAY OF SEX HORMONE GLOBUL: CPT

## 2018-03-12 PROCEDURE — 36415 COLL VENOUS BLD VENIPUNCTURE: CPT

## 2018-03-12 PROCEDURE — 84153 ASSAY OF PSA TOTAL: CPT

## 2018-03-12 PROCEDURE — 84443 ASSAY THYROID STIM HORMONE: CPT

## 2018-03-12 PROCEDURE — 82728 ASSAY OF FERRITIN: CPT

## 2018-03-12 PROCEDURE — 83036 HEMOGLOBIN GLYCOSYLATED A1C: CPT

## 2018-03-12 PROCEDURE — 84466 ASSAY OF TRANSFERRIN: CPT

## 2018-03-12 PROCEDURE — 84402 ASSAY OF FREE TESTOSTERONE: CPT

## 2018-03-12 PROCEDURE — 84146 ASSAY OF PROLACTIN: CPT

## 2018-03-12 PROCEDURE — 83001 ASSAY OF GONADOTROPIN (FSH): CPT

## 2018-03-12 PROCEDURE — 82746 ASSAY OF FOLIC ACID SERUM: CPT

## 2018-03-12 NOTE — PROGRESS NOTES
Mail results to pt, no treatment changes at this time.  Normal testosterone levels based on the patient's current blood work up.  Prolactin levels are also within normal limits.

## 2018-03-12 NOTE — PROGRESS NOTES
Mail results to pt, no treatment changes at this time. His percent saturation is high, he needs to discuss with Dr. Berry when he see him during next visit. He doesn't want to follow up with us.   Pending testosterone levels.

## 2018-03-13 LAB — SHBG SERPL-SCNC: 22 NMOL/L (ref 16.5–55.9)

## 2018-03-14 LAB — TESTOST FREE SERPL-MCNC: 15.8 PG/ML (ref 9.3–26.5)

## 2018-03-23 ENCOUNTER — OFFICE VISIT (OUTPATIENT)
Dept: ENDOCRINOLOGY | Age: 28
End: 2018-03-23

## 2018-03-23 VITALS
RESPIRATION RATE: 16 BRPM | DIASTOLIC BLOOD PRESSURE: 82 MMHG | SYSTOLIC BLOOD PRESSURE: 126 MMHG | BODY MASS INDEX: 36.65 KG/M2 | WEIGHT: 261.8 LBS | HEIGHT: 71 IN

## 2018-03-23 DIAGNOSIS — E55.9 VITAMIN D DEFICIENCY: ICD-10-CM

## 2018-03-23 DIAGNOSIS — R68.89 COLD INTOLERANCE: ICD-10-CM

## 2018-03-23 DIAGNOSIS — R79.89 LOW TESTOSTERONE IN MALE: Primary | Chronic | ICD-10-CM

## 2018-03-23 DIAGNOSIS — R53.82 CHRONIC FATIGUE: ICD-10-CM

## 2018-03-23 DIAGNOSIS — R53.1 WEAKNESS: ICD-10-CM

## 2018-03-23 PROCEDURE — 99215 OFFICE O/P EST HI 40 MIN: CPT | Performed by: INTERNAL MEDICINE

## 2018-03-23 RX ORDER — TESTOSTERONE CYPIONATE 200 MG/ML
200 INJECTION, SOLUTION INTRAMUSCULAR
COMMUNITY
End: 2018-04-11 | Stop reason: SDUPTHER

## 2018-03-23 RX ORDER — ERGOCALCIFEROL 1.25 MG/1
50000 CAPSULE ORAL WEEKLY
Qty: 13 CAPSULE | Refills: 3 | Status: SHIPPED | OUTPATIENT
Start: 2018-03-23 | End: 2019-03-23

## 2018-03-23 RX ORDER — BUPROPION HYDROCHLORIDE 300 MG/1
300 TABLET ORAL DAILY
Qty: 30 TABLET | Refills: 5 | Status: SHIPPED | OUTPATIENT
Start: 2018-03-23 | End: 2021-01-06

## 2018-03-23 NOTE — PROGRESS NOTES
"Subjective   Shaheen Rivera is a 27 y.o. male seen for follow up for testosterone deficiency, lab review. Previous patient of Dr. eLyva. Patient is having low sex drive, no energy, weight gain, puffy eyes, muscle weakness, mood swings, brain fog, cold intolerance, fatigue.     History of Present Illness this is a 27-year-old gentleman who is being seen for evaluation of his low testosterone as well as chronic and progressive fatigue and weight gain and no sex drive and erectile dysfunction and cold intolerance.  He had been on 200 mg of Depo testosterone once weekly and prior to that on AndroGel daily.  He has a family history of thyroid problem his mother who had undergone thyroid surgery for an unknown reason and had Crohn's disease.  He also complains of being sleepy and is sleeping about 12 hours each night but waking up very tired.  Because of his mood problem as well as lack of sexual desire and erectile dysfunction his engagement with his girlfriend is on hold.  He is also reporting 20 pounds of weight gain over the past 12 months.    /82   Resp 16   Ht 180.3 cm (71\")   Wt 119 kg (261 lb 12.8 oz)   BMI 36.51 kg/m²      No Known Allergies    Current Outpatient Prescriptions:   •  acyclovir (ZOVIRAX) 200 MG capsule, TAKE ONE CAPSULE BY MOUTH EVERY 4 HOURS, Disp: , Rfl:   •  Cyanocobalamin (B-12 COMPLIANCE INJECTION) 1000 MCG/ML kit, Inject  as directed., Disp: , Rfl:   •  FOLIC ACID PO, Take  by mouth., Disp: , Rfl:   •  Syringe 23G X 1\" 3 ML misc, Use weekly for testosterone injection, Disp: 50 each, Rfl: 0  •  Testosterone Cypionate (DEPOTESTOTERONE CYPIONATE) 200 MG/ML injection, Inject 200 mg into the shoulder, thigh, or buttocks Every 7 (Seven) Days., Disp: , Rfl:       The following portions of the patient's history were reviewed and updated as appropriate: allergies, current medications, past family history, past medical history, past social history, past surgical history and problem " list.    Review of Systems   Constitutional: Positive for activity change, fatigue and unexpected weight change.   HENT: Negative.    Eyes: Negative.    Respiratory: Negative.    Cardiovascular: Negative.    Gastrointestinal: Negative.    Endocrine: Positive for cold intolerance.   Genitourinary: Negative.    Musculoskeletal: Negative.    Skin: Negative.    Allergic/Immunologic: Negative.    Neurological: Positive for weakness (muscle weakness).   Hematological: Negative.    Psychiatric/Behavioral: Positive for decreased concentration.       Objective   Physical Exam   Constitutional: He is oriented to person, place, and time. He appears well-developed and well-nourished. No distress.   Subdued and depressed looking.   HENT:   Head: Normocephalic and atraumatic.   Right Ear: External ear normal.   Left Ear: External ear normal.   Nose: Nose normal.   Mouth/Throat: Oropharynx is clear and moist.   Eyes: Conjunctivae and EOM are normal. Pupils are equal, round, and reactive to light. Right eye exhibits no discharge. Left eye exhibits no discharge. No scleral icterus.   Neck: Normal range of motion. Neck supple. Carotid bruit is not present. No tracheal deviation present. No thyromegaly present.   Cardiovascular: Normal rate, regular rhythm and normal heart sounds.    Pulmonary/Chest: Effort normal and breath sounds normal. No stridor. No respiratory distress. He has no wheezes. He has no rales. He exhibits no tenderness.   Abdominal: Soft. Bowel sounds are normal. He exhibits no distension and no mass. There is no tenderness. There is no rebound and no guarding. No hernia.   Central obesity   Musculoskeletal: Normal range of motion. He exhibits no edema, tenderness or deformity.   Lymphadenopathy:     He has no cervical adenopathy.   Neurological: He is alert and oriented to person, place, and time. He displays normal reflexes. No cranial nerve deficit. He exhibits normal muscle tone. Coordination normal.   Skin: Skin  is warm and dry. No rash noted. He is not diaphoretic. No erythema. No pallor.   Psychiatric: He has a normal mood and affect. His behavior is normal.   Nursing note and vitals reviewed.       Results for orders placed or performed in visit on 03/12/18   Testosterone   Result Value Ref Range    Testosterone, Total 321.50 249.00 - 836.00 ng/dL   Testosterone Free Direct   Result Value Ref Range    Testosterone, Free 15.8 9.3 - 26.5 pg/mL   Sex Horm Binding Globulin   Result Value Ref Range    Sex Hormone Binding Globulin 22.0 16.5 - 55.9 nmol/L   Iron and TIBC   Result Value Ref Range    Iron 164 (H) 59 - 158 mcg/dL    Iron Saturation 52 (H) 20 - 50 %    Transferrin 212 200 - 360 mg/dL    TIBC 316 298 - 536 mcg/dL   Ferritin   Result Value Ref Range    Ferritin 147.90 30.00 - 400.00 ng/mL   PSA DIAGNOSTIC   Result Value Ref Range    PSA 0.673 0.000 - 4.000 ng/mL   Prolactin   Result Value Ref Range    Prolactin 6.71 4.04 - 15.20 ng/mL   FSH & LH   Result Value Ref Range    FSH 4.76 mIU/mL    LH 3.92 mIU/mL   TSH   Result Value Ref Range    TSH 2.520 0.270 - 4.200 mIU/mL   T4, Free   Result Value Ref Range    Free T4 1.09 0.93 - 1.70 ng/dL   Hemoglobin A1c   Result Value Ref Range    Hemoglobin A1C 4.95 4.80 - 5.60 %   Vitamin B12 & Folate   Result Value Ref Range    Folate 15.11 4.78 - 24.20 ng/mL    Vitamin B-12 1,009 (H) 211 - 946 pg/mL   Vitamin D 25 Hydroxy   Result Value Ref Range    25 Hydroxy, Vitamin D 29.5 (L) 30.0 - 100.0 ng/ml         Assessment/Plan   Diagnoses and all orders for this visit:    Low testosterone in male    Chronic fatigue  -     T3, Free; Future  -     T4 & TSH ([facility]); Future  -     T4, Free; Future  -     TestT+TestF+SHBG; Future  -     Uric Acid; Future  -     Vitamin D 25 Hydroxy; Future  -     Comprehensive Metabolic Panel; Future  -     CBC & Differential; Future  -     C-Peptide; Future  -     Follicle Stimulating Hormone; Future  -     Hemoglobin A1c; Future  -     Lipid Panel;  Future  -     Luteinizing Hormone; Future  -     Prolactin; Future  -     PSA DIAGNOSTIC; Future  -     ACTH; Future  -     Cortisol; Future    Cold intolerance  -     T3, Free; Future  -     T4 & TSH ([facility]); Future  -     T4, Free; Future  -     TestT+TestF+SHBG; Future  -     Uric Acid; Future  -     Vitamin D 25 Hydroxy; Future  -     Comprehensive Metabolic Panel; Future  -     CBC & Differential; Future  -     C-Peptide; Future  -     Follicle Stimulating Hormone; Future  -     Hemoglobin A1c; Future  -     Lipid Panel; Future  -     Luteinizing Hormone; Future  -     Prolactin; Future  -     PSA DIAGNOSTIC; Future  -     ACTH; Future  -     Cortisol; Future    Weakness  -     T3, Free; Future  -     T4 & TSH ([facility]); Future  -     T4, Free; Future  -     TestT+TestF+SHBG; Future  -     Uric Acid; Future  -     Vitamin D 25 Hydroxy; Future  -     Comprehensive Metabolic Panel; Future  -     CBC & Differential; Future  -     C-Peptide; Future  -     Follicle Stimulating Hormone; Future  -     Hemoglobin A1c; Future  -     Lipid Panel; Future  -     Luteinizing Hormone; Future  -     Prolactin; Future  -     PSA DIAGNOSTIC; Future  -     ACTH; Future  -     Cortisol; Future    Vitamin D deficiency  -     T3, Free; Future  -     T4 & TSH ([facility]); Future  -     T4, Free; Future  -     TestT+TestF+SHBG; Future  -     Uric Acid; Future  -     Vitamin D 25 Hydroxy; Future  -     Comprehensive Metabolic Panel; Future  -     CBC & Differential; Future  -     C-Peptide; Future  -     Follicle Stimulating Hormone; Future  -     Hemoglobin A1c; Future  -     Lipid Panel; Future  -     Luteinizing Hormone; Future  -     Prolactin; Future  -     PSA DIAGNOSTIC; Future  -     ACTH; Future  -     Cortisol; Future    Other orders  -     clomiPHENE (CLOMID) 50 MG tablet; Take 1 tablet by mouth Daily.  -     buPROPion XL (WELLBUTRIN XL) 300 MG 24 hr tablet; Take 1 tablet by mouth Daily.  -     ergocalciferol (DRISDOL)  78633 units capsule; Take 1 capsule by mouth 1 (One) Time Per Week.               In summary I saw and examined this 27-year-old gentleman for above-mentioned problems.  I reviewed his multiple laboratory evaluation including the one done on 03/12/2018 and at this point I ask him not to take any exogenous testosterone but would rather give him Clomid 50 mg daily to not on the raised his testosterone but also maintaining his fertility.  This office visit including 25 minutes of face-to-face patient counseling and education exceeded 40 minutes.  He will see Ms. Lavon Patterson in 2 months or sooner if needed with a detail laboratory evaluation prior to that office visit.

## 2018-03-28 ENCOUNTER — APPOINTMENT (OUTPATIENT)
Dept: GENERAL RADIOLOGY | Facility: HOSPITAL | Age: 28
End: 2018-03-28

## 2018-03-28 ENCOUNTER — HOSPITAL ENCOUNTER (EMERGENCY)
Facility: HOSPITAL | Age: 28
Discharge: HOME | End: 2018-03-28
Attending: FAMILY MEDICINE | Admitting: FAMILY MEDICINE

## 2018-03-28 VITALS
WEIGHT: 261 LBS | SYSTOLIC BLOOD PRESSURE: 145 MMHG | DIASTOLIC BLOOD PRESSURE: 90 MMHG | RESPIRATION RATE: 18 BRPM | HEART RATE: 83 BPM | BODY MASS INDEX: 36.54 KG/M2 | HEIGHT: 71 IN | OXYGEN SATURATION: 98 % | TEMPERATURE: 98.7 F

## 2018-03-28 DIAGNOSIS — R79.89 LOW TESTOSTERONE IN MALE: Chronic | ICD-10-CM

## 2018-03-28 DIAGNOSIS — S93.492A SPRAIN OF ANTERIOR TALOFIBULAR LIGAMENT OF LEFT ANKLE, INITIAL ENCOUNTER: Primary | ICD-10-CM

## 2018-03-28 PROCEDURE — 99283 EMERGENCY DEPT VISIT LOW MDM: CPT

## 2018-03-28 PROCEDURE — 73610 X-RAY EXAM OF ANKLE: CPT

## 2018-03-29 ENCOUNTER — TELEPHONE (OUTPATIENT)
Dept: SOCIAL WORK | Facility: HOSPITAL | Age: 28
End: 2018-03-29

## 2018-04-11 RX ORDER — TESTOSTERONE CYPIONATE 200 MG/ML
200 INJECTION, SOLUTION INTRAMUSCULAR
Qty: 4 ML | Refills: 5 | Status: SHIPPED | OUTPATIENT
Start: 2018-04-11 | End: 2022-06-20

## 2018-06-04 ENCOUNTER — TELEPHONE (OUTPATIENT)
Dept: INTERNAL MEDICINE | Age: 28
End: 2018-06-04

## 2018-06-04 NOTE — TELEPHONE ENCOUNTER
Kathy @ Dr. Velazquez (Urology) [phone]  Fax [phone]    Kathy needs a copy of the results of last lab taken to be faxed to her at the above fax number.    Thank you

## 2018-06-12 ENCOUNTER — RESULTS ENCOUNTER (OUTPATIENT)
Dept: ENDOCRINOLOGY | Age: 28
End: 2018-06-12

## 2018-06-12 DIAGNOSIS — E55.9 VITAMIN D DEFICIENCY: ICD-10-CM

## 2018-06-12 DIAGNOSIS — R53.82 CHRONIC FATIGUE: ICD-10-CM

## 2018-06-12 DIAGNOSIS — R53.1 WEAKNESS: ICD-10-CM

## 2018-06-12 DIAGNOSIS — R68.89 COLD INTOLERANCE: ICD-10-CM

## 2018-07-12 ENCOUNTER — OFFICE VISIT (OUTPATIENT)
Dept: INTERNAL MEDICINE | Age: 28
End: 2018-07-12

## 2018-07-12 VITALS
WEIGHT: 260 LBS | SYSTOLIC BLOOD PRESSURE: 124 MMHG | HEIGHT: 71 IN | DIASTOLIC BLOOD PRESSURE: 74 MMHG | BODY MASS INDEX: 36.4 KG/M2 | HEART RATE: 70 BPM | TEMPERATURE: 98.8 F | OXYGEN SATURATION: 98 %

## 2018-07-12 DIAGNOSIS — E29.1 HYPOGONADISM IN MALE: Primary | Chronic | ICD-10-CM

## 2018-07-12 PROCEDURE — 99214 OFFICE O/P EST MOD 30 MIN: CPT | Performed by: INTERNAL MEDICINE

## 2018-07-12 RX ORDER — ANASTROZOLE 1 MG/1
1 TABLET ORAL DAILY
COMMUNITY

## 2018-07-12 NOTE — PROGRESS NOTES
"[facility] [place] [facility]  KRISTI BRAVO M.D.      Shaheen Rivera / 27 y.o. / male  07/12/2018      ASSESSMENT & PLAN:    Problem List Items Addressed This Visit        High    Hypogonadism in male - Primary (Chronic)    Current Assessment & Plan     Currently on depotestosterone 200 mg IM weekly, Clomid, and Arimidex (for high estrogen level). Sees urologist (Marcus) and Endo (Jamal). Advised him to take the usual testosterone injection shot due today but discuss with specialists regarding his current problems.              No orders of the defined types were placed in this encounter.    No orders of the defined types were placed in this encounter.      Summary/Discussion:  Spent 25 minutes in face-to-face encounter time and >50% of time spent in counseling regarding above medical problems/issues.      No Follow-up on file.    ____________________________________________________________________    MEDICATIONS  Current Outpatient Prescriptions   Medication Sig Dispense Refill   • acyclovir (ZOVIRAX) 200 MG capsule TAKE ONE CAPSULE BY MOUTH EVERY 4 HOURS     • anastrozole (ARIMIDEX) 1 MG tablet Take 1 mg by mouth Daily.     • buPROPion XL (WELLBUTRIN XL) 300 MG 24 hr tablet Take 1 tablet by mouth Daily. 30 tablet 5   • clomiPHENE (CLOMID) 50 MG tablet Take 1 tablet by mouth Daily. 30 tablet 5   • Cyanocobalamin (B-12 COMPLIANCE INJECTION) 1000 MCG/ML kit Inject  as directed.     • ergocalciferol (DRISDOL) 37104 units capsule Take 1 capsule by mouth 1 (One) Time Per Week. 13 capsule 3   • FOLIC ACID PO Take  by mouth.     • Testosterone Cypionate (DEPOTESTOTERONE CYPIONATE) 200 MG/ML injection Inject 1 mL into the shoulder, thigh, or buttocks Every 7 (Seven) Days. 4 mL 5     No current facility-administered medications for this visit.          VITALS    Visit Vitals  /74   Pulse 70   Temp 98.8 °F (37.1 °C)   Ht 180.3 cm (70.98\")   Wt 118 kg (260 lb)   SpO2 98%   BMI 36.28 kg/m²       BP Readings from Last 3 " Encounters:   07/12/18 124/74   03/28/18 145/90   03/23/18 126/82     Wt Readings from Last 3 Encounters:   07/12/18 118 kg (260 lb)   03/28/18 118 kg (261 lb)   03/23/18 119 kg (261 lb 12.8 oz)      Body mass index is 36.28 kg/m².    CC:  Main reason(s) for today's visit: Fatigue (x 3 days all sx.); exsessive sleepiness; and wrist an elbow pain / joints      HPI:     Has been on depotestosterone 200 mg IM weekly as ordered by endocrinologist. He has also been seeing a urologist (Marcus) who prescribed Arimidex for elevated estrogen level. He has been on Clomid. Complains of 3 days significant generalized weakness/fatigue/left elbow and right wrist stiffness. No fever.     Patient Care Team:  Lico Golden MD as PCP - General (Internal Medicine)  ____________________________________________________________________    REVIEW OF SYSTEMS    Review of Systems   Constitutional: Positive for fatigue. Negative for fever.   Cardiovascular: Negative.    Gastrointestinal: Negative.    Musculoskeletal: Positive for arthralgias (right wrist/left elbow).   Neurological: Positive for weakness (general).         PHYSICAL EXAMINATION    Physical Exam   Constitutional: No distress.   Musculoskeletal:   No swelling, redness, tenderness of wrists/elbows   Neurological: He is alert.   Psychiatric: Judgment and thought content normal.   Dysphoric          REVIEWED DATA:    Labs:     Lab Results   Component Value Date     12/16/2017    K 4.4 12/16/2017    AST 22 12/16/2017    ALT 23 12/16/2017    BUN 14 12/16/2017    CREATININE 1.14 12/16/2017    CREATININE 1.08 12/03/2017    EGFRIFNONA 78 12/16/2017       Lab Results   Component Value Date    HGBA1C 4.95 03/12/2018    GLUCOSE 88 12/16/2017    GLUCOSE 117 (H) 12/03/2017       No results found for: LDL, HDL, TRIG, CHOLHDLRATIO    Lab Results   Component Value Date    TSH 2.520 03/12/2018    FREET4 1.09 03/12/2018       Lab Results   Component Value Date    WBC 8.19 12/16/2017    HGB  16.6 12/16/2017    HGB 15.0 12/03/2017     12/16/2017      No results found for: TESTOSTEROTT  Lab Results   Component Value Date    TESTFRE 15.8 03/12/2018    TESTFRE 24.7 12/16/2017     Lab Results   Component Value Date    PSA 0.673 03/12/2018      Imaging:         Medical Tests:         Summary of old records / correspondence / consultant report:         Request outside records:         ALLERGIES  No Known Allergies     PFSH:     The following portions of the patient's history were reviewed and updated as appropriate: Allergies / Current Medications / Past Medical History / Surgical History / Social History / Family History    PROBLEM LIST   Patient Active Problem List   Diagnosis   • Vitamin B 12 deficiency   • Hypogonadism in male   • Retinitis pigmentosa of both eyes   • ADHD (attention deficit hyperactivity disorder)   • Weakness   • Cold intolerance       PAST MEDICAL HISTORY  Past Medical History:   Diagnosis Date   • ADHD    • HSV-2 (herpes simplex virus 2) infection    • Low testosterone in male    • Testosterone deficiency    • Vitamin B 12 deficiency        SURGICAL HISTORY  Past Surgical History:   Procedure Laterality Date   • ADENOIDECTOMY     • APPENDECTOMY     • TONSILLECTOMY         SOCIAL HISTORY  Social History     Social History   • Marital status: Single     Spouse name: has a fiance   • Number of children: 0     Occupational History   • 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[employer])      Social History Main Topics   • Smoking status: Never Smoker   • Smokeless tobacco: Never Used   • Alcohol use Yes      Comment: OCCASIONAL    • Drug use: No      Comment: former casual marijuana use (more than 2 years ago)   • Sexual activity: Not Currently     Other Topics Concern   • Not on file       FAMILY HISTORY  Family History   Problem Relation Age of Onset   • Lung cancer Mother 49        smoker   • Graves' disease Mother    • Lung cancer Father 52        smoker   • No Known Problems Brother    • Coronary  artery disease Paternal Grandmother    • Thyroid disease Paternal Grandmother    • Suicidality Maternal Uncle         committed suicide           **Daly Disclaimer:   Much of this encounter note is an electronic transcription/translation of spoken language to printed text. The electronic translation of spoken language may permit erroneous, or at times, nonsensical words or phrases to be inadvertently transcribed. Although I have reviewed the note for such errors, some may still exist.

## 2018-07-12 NOTE — ASSESSMENT & PLAN NOTE
Currently on depotestosterone 200 mg IM weekly, Clomid, and Arimidex (for high estrogen level). Sees urologist (Marcus) and Endo (Jamal). Advised him to take the usual testosterone injection shot due today but discuss with specialists regarding his current problems.

## 2018-08-03 DIAGNOSIS — E29.1 HYPOGONADISM IN MALE: Primary | Chronic | ICD-10-CM

## 2018-08-03 DIAGNOSIS — E55.9 VITAMIN D DEFICIENCY: ICD-10-CM

## 2018-09-03 ENCOUNTER — RESULTS ENCOUNTER (OUTPATIENT)
Dept: ENDOCRINOLOGY | Age: 28
End: 2018-09-03

## 2018-09-03 DIAGNOSIS — E55.9 VITAMIN D DEFICIENCY: ICD-10-CM

## 2018-09-03 DIAGNOSIS — E29.1 HYPOGONADISM IN MALE: Chronic | ICD-10-CM

## 2019-01-09 ENCOUNTER — OFFICE VISIT (OUTPATIENT)
Dept: INTERNAL MEDICINE | Age: 29
End: 2019-01-09

## 2019-01-09 VITALS
BODY MASS INDEX: 36.82 KG/M2 | HEIGHT: 71 IN | HEART RATE: 78 BPM | OXYGEN SATURATION: 98 % | SYSTOLIC BLOOD PRESSURE: 116 MMHG | TEMPERATURE: 98.5 F | DIASTOLIC BLOOD PRESSURE: 64 MMHG | WEIGHT: 263 LBS

## 2019-01-09 DIAGNOSIS — E29.1 HYPOGONADISM IN MALE: Chronic | ICD-10-CM

## 2019-01-09 DIAGNOSIS — B00.9 HSV-2 INFECTION: Primary | ICD-10-CM

## 2019-01-09 DIAGNOSIS — Z99.89 OSA ON CPAP: Chronic | ICD-10-CM

## 2019-01-09 DIAGNOSIS — F90.9 ATTENTION DEFICIT HYPERACTIVITY DISORDER (ADHD), UNSPECIFIED ADHD TYPE: Chronic | ICD-10-CM

## 2019-01-09 DIAGNOSIS — E53.8 VITAMIN B 12 DEFICIENCY: Chronic | ICD-10-CM

## 2019-01-09 DIAGNOSIS — G47.33 OSA ON CPAP: Chronic | ICD-10-CM

## 2019-01-09 PROCEDURE — 99214 OFFICE O/P EST MOD 30 MIN: CPT | Performed by: INTERNAL MEDICINE

## 2019-01-09 RX ORDER — ACYCLOVIR 200 MG/1
200 CAPSULE ORAL DAILY
Qty: 30 CAPSULE | Refills: 5 | Status: SHIPPED | OUTPATIENT
Start: 2019-01-09 | End: 2019-01-22 | Stop reason: SDUPTHER

## 2019-01-09 NOTE — PROGRESS NOTES
[facility] [place] [facility]  KRISTI BRAVO M.D.      Shaheen Rivera / 28 y.o. / male  01/09/2019      ASSESSMENT & PLAN:    Problem List Items Addressed This Visit        High    Vitamin B 12 deficiency (Chronic)    Current Assessment & Plan     Refilled B12. Continue monthly injections.          Relevant Medications    Cyanocobalamin (B-12 COMPLIANCE INJECTION) 1000 MCG/ML kit       Medium    GOKUL on CPAP (Chronic)    Overview     Continue CPAP.          Current Assessment & Plan     Doing better now on CPAP.             Low    Hypogonadism in male (Chronic)    Overview     Continue with Dr. Salgado and testosterone injection.          ADHD (attention deficit hyperactivity disorder) (Chronic)    Overview     Dx'ed as a child but never medicated until in college. Stopped on own around July 2017.          Current Assessment & Plan     Now on Vyvanse through psychiatrist.          Relevant Medications    buPROPion XL (WELLBUTRIN XL) 300 MG 24 hr tablet    lisdexamfetamine (VYVANSE) 20 MG capsule      Other Visit Diagnoses     HSV-2 infection    -  Primary    Relevant Medications    acyclovir (ZOVIRAX) 200 MG capsule        No orders of the defined types were placed in this encounter.    New Medications Ordered This Visit   Medications   • acyclovir (ZOVIRAX) 200 MG capsule     Sig: Take 1 capsule by mouth Daily.     Dispense:  30 capsule     Refill:  5   • Cyanocobalamin (B-12 COMPLIANCE INJECTION) 1000 MCG/ML kit     Sig: Inject 1,000 mcg as directed Every 30 (Thirty) Days.     Dispense:  1 kit     Refill:  5       Summary/Discussion:  ·     Return in about 6 months (around 7/9/2019) for Annual physical.    ____________________________________________________________________    MEDICATIONS  Current Outpatient Medications on File Prior to Visit   Medication Sig   • lisdexamfetamine (VYVANSE) 20 MG capsule Take 20 mg by mouth Every Morning   • anastrozole (ARIMIDEX) 1 MG tablet Take 1 mg by mouth Daily.   • buPROPion XL  "(WELLBUTRIN XL) 300 MG 24 hr tablet Take 1 tablet by mouth Daily.   • clomiPHENE (CLOMID) 50 MG tablet Take 1 tablet by mouth Daily.   • ergocalciferol (DRISDOL) 50019 units capsule Take 1 capsule by mouth 1 (One) Time Per Week.   • FOLIC ACID PO Take  by mouth.   • Testosterone Cypionate (DEPOTESTOTERONE CYPIONATE) 200 MG/ML injection Inject 1 mL into the shoulder, thigh, or buttocks Every 7 (Seven) Days.   • [DISCONTINUED] acyclovir (ZOVIRAX) 200 MG capsule TAKE ONE CAPSULE BY MOUTH EVERY 4 HOURS   • [DISCONTINUED] Cyanocobalamin (B-12 COMPLIANCE INJECTION) 1000 MCG/ML kit Inject  as directed.     No current facility-administered medications on file prior to visit.           VITALS:    Visit Vitals  /64   Pulse 78   Temp 98.5 °F (36.9 °C)   Ht 180.3 cm (70.98\")   Wt 119 kg (263 lb)   SpO2 98%   BMI 36.70 kg/m²       BP Readings from Last 3 Encounters:   01/09/19 116/64   07/12/18 124/74   03/28/18 145/90     Wt Readings from Last 3 Encounters:   01/09/19 119 kg (263 lb)   07/12/18 118 kg (260 lb)   03/28/18 118 kg (261 lb)      Body mass index is 36.7 kg/m².    CC:  Main reason(s) for today's visit: Hypogonadism and ADHD      HPI:   Needs refill on acyclovir for HSV-2 infection.     On CPAP now for GOKUL with improvement in tiredness, weight loss.     On Vyvanse 20 mg now for ADHD. Also on Wellbutrin XL.     Sees Dr. Salgado for hypogonadism on testosterone and Clomid/Arimidex.     Patient Care Team:  Lico Golden MD as PCP - General (Internal Medicine)    ____________________________________________________________________    REVIEW OF SYSTEMS    Review of Systems  Constitutional neg  Resp compliant with cpap  CV neg  Neuro neg      PHYSICAL EXAMINATION    Physical Exam  Constitutional  No distress  Cardiovascular Rate  normal . Rhythm: regular . Heart sounds:  Normal  Pulmonary/Chest  Effort normal. Breath sounds:  Normal  Psychiatric  Alert. Judgment and thought content normal. Mood normal    REVIEWED " DATA:    Labs:     Lab Results   Component Value Date     12/16/2017    K 4.4 12/16/2017    AST 22 12/16/2017    ALT 23 12/16/2017    BUN 14 12/16/2017    CREATININE 1.14 12/16/2017    CREATININE 1.08 12/03/2017    EGFRIFNONA 78 12/16/2017       Lab Results   Component Value Date    HGBA1C 4.95 03/12/2018    GLUCOSE 88 12/16/2017    GLUCOSE 117 (H) 12/03/2017       No results found for: LDL, HDL, TRIG, CHOLHDLRATIO    Lab Results   Component Value Date    TSH 2.520 03/12/2018    FREET4 1.09 03/12/2018       Lab Results   Component Value Date    WBC 8.19 12/16/2017    HGB 16.6 12/16/2017    HGB 15.0 12/03/2017     12/16/2017       Lab Results   Component Value Date    GLUCOSEU Negative 12/16/2017    BLOODU Negative 12/16/2017    NITRITEU Negative 12/16/2017    LEUKOCYTESUR Negative 12/16/2017       Imaging:         Medical Tests:         Summary of old records / correspondence / consultant report:         Request outside records:         ALLERGIES  No Known Allergies     PFSH:     The following portions of the patient's history were reviewed and updated as appropriate: Allergies / Current Medications / Past Medical History / Surgical History / Social History / Family History    PROBLEM LIST   Patient Active Problem List   Diagnosis   • Vitamin B 12 deficiency   • Hypogonadism in male   • Retinitis pigmentosa of both eyes   • ADHD (attention deficit hyperactivity disorder)   • GOKUL on CPAP       PAST MEDICAL HISTORY  Past Medical History:   Diagnosis Date   • ADHD    • HSV-2 (herpes simplex virus 2) infection    • Low testosterone in male    • Testosterone deficiency    • Vitamin B 12 deficiency        SURGICAL HISTORY  Past Surgical History:   Procedure Laterality Date   • ADENOIDECTOMY     • APPENDECTOMY     • TONSILLECTOMY         SOCIAL HISTORY  Social History     Socioeconomic History   • Marital status: Single     Spouse name: has a fiance   • Number of children: 0   • Years of education: Not on file    • Highest education level: Not on file   Occupational History   • 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[employer])   Tobacco Use   • Smoking status: Never Smoker   • Smokeless tobacco: Never Used   Substance and Sexual Activity   • Alcohol use: Yes     Comment: OCCASIONAL    • Drug use: No     Comment: former casual marijuana use (more than 2 years ago)   • Sexual activity: Not Currently       FAMILY HISTORY  Family History   Problem Relation Age of Onset   • Lung cancer Mother 49        smoker   • Graves' disease Mother    • Lung cancer Father 52        smoker   • No Known Problems Brother    • Coronary artery disease Paternal Grandmother    • Thyroid disease Paternal Grandmother    • Suicidality Maternal Uncle         committed suicide         **Dragon Disclaimer:   Much of this encounter note is an electronic transcription/translation of spoken language to printed text. The electronic translation of spoken language may permit erroneous, or at times, nonsensical words or phrases to be inadvertently transcribed. Although I have reviewed the note for such errors, some may still exist.

## 2019-01-22 ENCOUNTER — TELEPHONE (OUTPATIENT)
Dept: INTERNAL MEDICINE | Age: 29
End: 2019-01-22

## 2019-01-22 ENCOUNTER — PATIENT MESSAGE (OUTPATIENT)
Dept: INTERNAL MEDICINE | Age: 29
End: 2019-01-22

## 2019-01-22 DIAGNOSIS — B00.9 HSV-2 INFECTION: ICD-10-CM

## 2019-01-22 RX ORDER — ACYCLOVIR 200 MG/1
200 CAPSULE ORAL 2 TIMES DAILY
Qty: 60 CAPSULE | Refills: 5 | Status: SHIPPED | OUTPATIENT
Start: 2019-01-22 | End: 2019-10-24 | Stop reason: SDUPTHER

## 2019-01-22 NOTE — TELEPHONE ENCOUNTER
Regarding: Prescription Question  Contact: [phone]  ----- Message from [employer], Generic sent at 1/22/2019 11:54 AM EST -----    They accidentally called in the wrong acyclovir they called in 200mg not 400mg, i was taking 400mg

## 2019-05-29 ENCOUNTER — HOSPITAL ENCOUNTER (EMERGENCY)
Facility: HOSPITAL | Age: 29
Discharge: HOME | End: 2019-05-29
Attending: EMERGENCY MEDICINE | Admitting: EMERGENCY MEDICINE

## 2019-05-29 ENCOUNTER — APPOINTMENT (OUTPATIENT)
Dept: GENERAL RADIOLOGY | Facility: HOSPITAL | Age: 29
End: 2019-05-29

## 2019-05-29 VITALS
TEMPERATURE: 96.5 F | OXYGEN SATURATION: 94 % | HEIGHT: 71 IN | RESPIRATION RATE: 16 BRPM | BODY MASS INDEX: 37.8 KG/M2 | SYSTOLIC BLOOD PRESSURE: 136 MMHG | HEART RATE: 88 BPM | WEIGHT: 270 LBS | DIASTOLIC BLOOD PRESSURE: 90 MMHG

## 2019-05-29 DIAGNOSIS — F41.9 ACUTE ANXIETY: Primary | ICD-10-CM

## 2019-05-29 DIAGNOSIS — R07.89 ATYPICAL CHEST PAIN: ICD-10-CM

## 2019-05-29 PROCEDURE — 71046 X-RAY EXAM CHEST 2 VIEWS: CPT

## 2019-05-29 PROCEDURE — 99283 EMERGENCY DEPT VISIT LOW MDM: CPT

## 2019-05-29 PROCEDURE — 93005 ELECTROCARDIOGRAM TRACING: CPT | Performed by: EMERGENCY MEDICINE

## 2019-05-29 PROCEDURE — 93010 ELECTROCARDIOGRAM REPORT: CPT | Performed by: INTERNAL MEDICINE

## 2019-05-29 RX ORDER — LORAZEPAM 0.5 MG/1
0.5 TABLET ORAL 2 TIMES DAILY
Qty: 10 TABLET | Refills: 0 | Status: SHIPPED | OUTPATIENT
Start: 2019-05-29 | End: 2021-09-14

## 2019-07-05 DIAGNOSIS — Z00.00 PREVENTATIVE HEALTH CARE: Primary | ICD-10-CM

## 2019-10-24 DIAGNOSIS — B00.9 HSV-2 INFECTION: ICD-10-CM

## 2019-10-24 RX ORDER — ACYCLOVIR 200 MG/1
200 CAPSULE ORAL 2 TIMES DAILY
Qty: 60 CAPSULE | Refills: 1 | Status: SHIPPED | OUTPATIENT
Start: 2019-10-24

## 2019-10-24 NOTE — TELEPHONE ENCOUNTER
Overdue for follow-up. Will okay 2 refills total. Must be seen prior to next refill. (okay to see APRN)

## 2020-12-18 DIAGNOSIS — Z00.00 PREVENTATIVE HEALTH CARE: Primary | ICD-10-CM

## 2020-12-29 ENCOUNTER — TELEPHONE (OUTPATIENT)
Dept: INTERNAL MEDICINE | Age: 30
End: 2020-12-29

## 2020-12-29 NOTE — TELEPHONE ENCOUNTER
PT CALLED TO CANCEL HIS LAB APPT FOR 12-30 BECAUSE HE IS CURRENTLY ADMITTED AT [facility].  HE IS STILL WANTING TO KEEP HIS APPT ON 1-6 FOR HIS PHYSICAL AND SAID AS SOON AS HE GETS OUT HE WILL CALL AND TRY TO GET IN TO GET THOSE LABS TAKEN CARE OF.

## 2021-01-05 ENCOUNTER — TELEPHONE (OUTPATIENT)
Dept: INTERNAL MEDICINE | Age: 31
End: 2021-01-05

## 2021-01-05 NOTE — TELEPHONE ENCOUNTER
Called pt regarding CPE labs   He just got out of hospital yesterday     He is coming fasting for his jacki

## 2021-01-06 ENCOUNTER — OFFICE VISIT (OUTPATIENT)
Dept: INTERNAL MEDICINE | Age: 31
End: 2021-01-06

## 2021-01-06 VITALS
TEMPERATURE: 98.1 F | WEIGHT: 248 LBS | HEART RATE: 87 BPM | OXYGEN SATURATION: 98 % | DIASTOLIC BLOOD PRESSURE: 74 MMHG | SYSTOLIC BLOOD PRESSURE: 104 MMHG | HEIGHT: 72 IN | BODY MASS INDEX: 33.59 KG/M2

## 2021-01-06 DIAGNOSIS — I48.91 ATRIAL FIBRILLATION, UNSPECIFIED TYPE (HCC): Primary | ICD-10-CM

## 2021-01-06 DIAGNOSIS — I50.21 ACUTE SYSTOLIC CHF (CONGESTIVE HEART FAILURE) (HCC): ICD-10-CM

## 2021-01-06 PROCEDURE — 99214 OFFICE O/P EST MOD 30 MIN: CPT | Performed by: INTERNAL MEDICINE

## 2021-01-06 RX ORDER — FUROSEMIDE 40 MG/1
40 TABLET ORAL 2 TIMES DAILY
COMMUNITY
Start: 2021-01-03

## 2021-01-06 RX ORDER — DIGOXIN 125 MCG
125 TABLET ORAL
COMMUNITY
Start: 2021-01-03 | End: 2021-09-14

## 2021-01-06 RX ORDER — METOPROLOL TARTRATE 100 MG/1
1 TABLET ORAL 2 TIMES DAILY
COMMUNITY
Start: 2021-01-03 | End: 2021-09-14

## 2021-01-06 RX ORDER — SPIRONOLACTONE 25 MG/1
25 TABLET ORAL DAILY
COMMUNITY
Start: 2021-01-03

## 2021-01-06 NOTE — PROGRESS NOTES
"    I N T E R N A L  M E D I C I N E  J U N O H  K I M,  M D      ENCOUNTER DATE:  01/06/2021    Shaheen Rivera / 30 y.o. / male      CHIEF COMPLAINT / REASON FOR OFFICE VISIT     Annual Exam      ASSESSMENT & PLAN     1. Atrial fibrillation, unspecified type (CMS/HCC)    2. Acute systolic CHF (congestive heart failure) (CMS/HCC)      No orders of the defined types were placed in this encounter.    No orders of the defined types were placed in this encounter.      SUMMARY/DISCUSSION  • Compensated currently. Continue all current medications including Eliquis, metoprolol, spironolactone and furosemide. Follow-up with cardiologist later this month.   • Recheck CMP with cardiologist on follow-up this month (elevated LFT/lasix)  • Follow-up 3 months       Next Appointment with me: Visit date not found    Return in about 3 months (around 4/6/2021) for afib, chf .      VITAL SIGNS     Visit Vitals  /74   Pulse 87   Temp 98.1 °F (36.7 °C)   Ht 182.9 cm (72\")   Wt 112 kg (248 lb)   SpO2 98%   BMI 33.63 kg/m²       BP Readings from Last 3 Encounters:   01/06/21 104/74   05/29/19 136/90   01/09/19 116/64     Wt Readings from Last 3 Encounters:   01/06/21 112 kg (248 lb)   05/29/19 122 kg (270 lb)   01/09/19 119 kg (263 lb)     Body mass index is 33.63 kg/m².        HISTORY OF PRESENT ILLNESS     Was hospitalized last month for new onset atrial fibrillation with RVR, acute CHF (EF 23%) and splenic infarct with respiratory failure. Evaluated and treated by cardiologist for atrial fibrillation and started on AC therapy, currently Eliquis 5 mg BID. Splenic infarct was determined to be secondary to atrial fibrillation. Denies bleeding/bruising. Weight remains stable since discharge. Has an external defibrillator. Has cardiologist follow-up later this month. He is on metoprolol, spironolactone and furosemide. Weaning off Vyvanse (due to atrial fibrillation). He is on Arimidex related to his hormone deficiency. On CPAP. "         REVIEW OF SYSTEMS     Review of Systems  Wt loss since hospitalization, stable since discharge  Denies chest pain, PND/orthopnea, increased edema  GI neg for bleed  Neuro neg      PHYSICAL EXAMINATION     Physical Exam  No acute distress but appears tired   CV: irregular, normal rate of 80-90's, no edema   Pulm/Chest: Effort normal, breath sounds normal.       REVIEWED DATA     Labs:     Outside labs from [facility]  1/3/21: Na 134, K 4.6, creatinine 1.3, , AST 54, hemoglobin 16.9, platelets 290      Imaging:           Medical Tests:     DATE OF EXAM: 12/27/2020  13:16  EXAMINATION(S): ECHO DOPPLER 2D MMODE SPECT COLOR COMPLETE     Summary   The ejection fraction biplane was calculated at 23 %.   Diastolic function is indeterminate due to Afib.     Mild to moderately dilated right ventricle.   Right ventricular global systolic function is moderately reduced.   Right ventricular systolic pressure of 47 mmHg.     Moderately dilated left atrium.   There is mild right atrial enlargement.     Dilated IVC with poor inspiratory collapse consistent with elevated right   atrial pressure, RA estimated around 15 mmHg      Summary of old records / correspondence / consultant report:     [facility] discharge summary for atrial fibrillation with RVR and CHF       Request outside records:           MEDICATIONS AT THE TIME OF OFFICE VISIT     Current Outpatient Medications   Medication Sig Dispense Refill   • acyclovir (ZOVIRAX) 200 MG capsule Take 1 capsule by mouth 2 (Two) Times a Day. 60 capsule 1   • anastrozole (ARIMIDEX) 1 MG tablet Take 1 mg by mouth Daily.     • apixaban (ELIQUIS) 5 MG tablet tablet Take 5 mg by mouth 2 (two) times a day.     • digoxin (LANOXIN) 125 MCG tablet Take 125 mcg by mouth Daily.     • FOLIC ACID PO Take  by mouth.     • furosemide (LASIX) 40 MG tablet 40 mg 2 (Two) Times a Day.     • lisdexamfetamine (VYVANSE) 20 MG capsule Take 20 mg by mouth Every Morning Weaning off      • metoprolol tartrate  (LOPRESSOR) 100 MG tablet Take 1 tablet by mouth 2 (two) times a day.     • spironolactone (ALDACTONE) 25 MG tablet Take 25 mg by mouth Daily.     • Cyanocobalamin (B-12 COMPLIANCE INJECTION) 1000 MCG/ML kit Inject 1,000 mcg as directed Every 30 (Thirty) Days. 1 kit 5   • LORazepam (ATIVAN) 0.5 MG tablet Take 1 tablet by mouth 2 (Two) Times a Day. 10 tablet 0   • Testosterone Cypionate (DEPOTESTOTERONE CYPIONATE) 200 MG/ML injection Inject 1 mL into the shoulder, thigh, or buttocks Every 7 (Seven) Days. 4 mL 5     No current facility-administered medications for this visit.            *Examiner was wearing KN95 mask, face shield and exam gloves during the entire duration of the visit. Patient was masked the entire time.   Minimum social distance of 6 ft maintained entire visit except if physical contact was necessary as documented.     **Dragon Disclaimer:   Much of this encounter note is an electronic transcription/translation of spoken language to printed text. The electronic translation of spoken language may permit erroneous, or at times, nonsensical words or phrases to be inadvertently transcribed. Although I have reviewed the note for such errors, some may still exist.     Template created by Maged Golden MD

## 2021-01-06 NOTE — PATIENT INSTRUCTIONS
** IMPORTANT MESSAGE FROM DR. BRAVO **    In our office, your satisfaction is VERY important to us.     You may receive a survey from [facility] by mail or E-mail for you to provide feedback about your visit. This information is invaluable for me to know what we can do to improve our services.     I ask that you please take a few minutes to complete the survey and let us know how we are doing in serving your needs. (You may receive the survey more than once for multiple visits)    Thank You !    Dr. Bravo & Staff    _________________________________________________________________________________________________________________________      ** ADDITIONAL INSTRUCTION / REMINDERS FROM DR. BRAVO **

## 2021-03-15 ENCOUNTER — TELEPHONE (OUTPATIENT)
Dept: ORTHOPEDIC SURGERY | Facility: CLINIC | Age: 31
End: 2021-03-15

## 2021-03-15 NOTE — TELEPHONE ENCOUNTER
CALLED TO GET ORTHO APPROVAL ON RIGHT KNEE AND TT WITH ALFREDO DELGADO AND SHE SAID WHEN WE COULD NOT SEE HIM ON 3/5/21 HE HAS ALREADY RECEIVED APPOINTMENT WITH JING BARNARD SO HE WILL NOT BE COMING HERE/DM

## 2021-04-08 NOTE — PROGRESS NOTES
New Right Knee      Patient: Shaheen Rivera        YOB: 1990    Medical Record Number: [id number]        Chief Complaints: right knee pain      History of Present Illness: This is a No no no this is a 30-year-old male who presents complaining of a right knee injury.  He injured it in September was a hyperflexion type injury he self semitruck's miles he states since that time  no films 6 months he has had episodes of giving way it feels unstable he does have a history of DVT and heart failure in January is currently wearing an external defibrillator and is currently doing cardiac rehab.  Symptoms are moderate severe 5 out of 10 aching stiffness giving way worse with walking and activity somewhat better with rest past medical history is as above        Allergies: No Known Allergies    Medications:   Home Medications:  Current Outpatient Medications on File Prior to Visit   Medication Sig   • acyclovir (ZOVIRAX) 200 MG capsule Take 1 capsule by mouth 2 (Two) Times a Day.   • anastrozole (ARIMIDEX) 1 MG tablet Take 1 mg by mouth Daily.   • apixaban (ELIQUIS) 5 MG tablet tablet Take 5 mg by mouth 2 (two) times a day.   • Cyanocobalamin (B-12 COMPLIANCE INJECTION) 1000 MCG/ML kit Inject 1,000 mcg as directed Every 30 (Thirty) Days.   • digoxin (LANOXIN) 125 MCG tablet Take 125 mcg by mouth Daily.   • FOLIC ACID PO Take  by mouth.   • furosemide (LASIX) 40 MG tablet 40 mg 2 (Two) Times a Day.   • lisdexamfetamine (VYVANSE) 20 MG capsule Take 20 mg by mouth Every Morning Weaning off    • LORazepam (ATIVAN) 0.5 MG tablet Take 1 tablet by mouth 2 (Two) Times a Day.   • metoprolol tartrate (LOPRESSOR) 100 MG tablet Take 1 tablet by mouth 2 (two) times a day.   • spironolactone (ALDACTONE) 25 MG tablet Take 25 mg by mouth Daily.   • Testosterone Cypionate (DEPOTESTOTERONE CYPIONATE) 200 MG/ML injection Inject 1 mL into the shoulder, thigh, or buttocks Every 7 (Seven) Days.     No current facility-administered  "medications on file prior to visit.     Current Medications:  Scheduled Meds:  Continuous Infusions:No current facility-administered medications for this visit.    PRN Meds:.    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[employer])   Tobacco Use   • Smoking status: Never Smoker   • Smokeless tobacco: Never Used   Substance and Sexual Activity   • Alcohol use: Yes     Comment: OCCASIONAL    • Drug use: No     Comment: former casual marijuana use (more than 2 years ago)   • Sexual activity: Not on file      Social History     Social History Narrative   • Not on file        Family History   Problem Relation Age of Onset   • Lung cancer Mother 49        smoker   • Graves' disease Mother    • Lung cancer Father 52        smoker   • No Known Problems Brother    • Coronary artery disease Paternal Grandmother    • Thyroid disease Paternal Grandmother    • Suicidality Maternal Uncle         committed suicide             Review of Systems: 14 point review of systems are remarkable for the pertinent positives listed in the chart by the patient the remainder negative    Review of Systems      Physical Exam: 30 y.o. male  General Appearance:    Alert, cooperative, in no acute distress                   Vitals:    04/09/21 1416   Temp: 96.8 °F (36 °C)   TempSrc: Temporal   Weight: 110 kg (242 lb)   Height: 182.9 cm (72\")      Patient is alert and read ×3 no acute distress appears her above-listed at height weight and age.  Affect is normal respiratory rate is normal unlabored. Heart rate regular rate rhythm, sclera, dentition and " hearing are normal for the purpose of this exam.        Ortho Exam Physical exam of the right knee reveals no effusion, no erythema.  The patient has no palpable tenderness along the medial joint line, no tenderness about the lateral joint line.  Patient does have crepitus with patellofemoral range of motion.  They also have subjective symptoms anteriorly during exam.  The patient has a negative bounce home, negative Kasia and a stable ligamentous exam.  Quad tone is reasonable and symmetric.  There are no overlying skin changes no lymphedema no lymphadenopathy.  There is good hip range of motion which is full symmetric and asymptomatic and a normal ankle exam.  Hamstrings and IT band are tight bilaterally.        Large Joint Arthrocentesis: R knee  Date/Time: 4/9/2021 2:52 PM  Consent given by: patient  Site marked: site marked  Timeout: Immediately prior to procedure a time out was called to verify the correct patient, procedure, equipment, support staff and site/side marked as required   Supporting Documentation  Indications: pain   Procedure Details  Location: knee - R knee  Preparation: Patient was prepped and draped in the usual sterile fashion  Needle size: 22 G  Approach: anteromedial  Medications administered: 80 mg methylPREDNISolone acetate 80 MG/ML; 4 mL lidocaine (cardiac)  Patient tolerance: patient tolerated the procedure well with no immediate complications                   Radiology:   AP, Lateral and merchant views of the right knee  were ordered/reviewed to evauateknee pain.  I have no comparative films these are normal no evidence of any acute bony pathology  Imaging Results (Most Recent)     Procedure Component Value Units Date/Time    XR Knee 3 View Right [[id number]] Resulted: 04/09/21 1108     Updated: 04/09/21 1412    Impression:      Ordering physician's impression is located in the Encounter Note dated 04/09/21. X-ray performed in the DR room.          Assessment/Plan:    Right knee  pain a lot of this seems patellofemoral most of his subjective complaints are anterior with his symptoms of giving way one might be concerned with ligamentous injury however I do not pick that up on exam today.  With everything going on is not a great operative candidate I would try conservative at this point which she is in agreement with.  We will inject his knee today to give him some relief I will have him see physical therapy maybe incorporate that with his cardiac rehab.  Obviously the cardiac rehab takes precedent.  I will see him back for 5 weeks if he fails to improve we can get an MRI if he can take his external defibrillator offer.

## 2021-04-09 ENCOUNTER — OFFICE VISIT (OUTPATIENT)
Dept: ORTHOPEDIC SURGERY | Facility: CLINIC | Age: 31
End: 2021-04-09

## 2021-04-09 VITALS — HEIGHT: 72 IN | BODY MASS INDEX: 32.78 KG/M2 | WEIGHT: 242 LBS | TEMPERATURE: 96.8 F

## 2021-04-09 DIAGNOSIS — M25.561 RIGHT KNEE PAIN, UNSPECIFIED CHRONICITY: Primary | ICD-10-CM

## 2021-04-09 DIAGNOSIS — M25.861 PATELLOFEMORAL DYSFUNCTION OF RIGHT KNEE: ICD-10-CM

## 2021-04-09 PROCEDURE — 99204 OFFICE O/P NEW MOD 45 MIN: CPT | Performed by: ORTHOPAEDIC SURGERY

## 2021-04-09 PROCEDURE — 73562 X-RAY EXAM OF KNEE 3: CPT | Performed by: ORTHOPAEDIC SURGERY

## 2021-04-09 PROCEDURE — 20610 DRAIN/INJ JOINT/BURSA W/O US: CPT | Performed by: ORTHOPAEDIC SURGERY

## 2021-04-09 RX ADMIN — METHYLPREDNISOLONE ACETATE 80 MG: 80 INJECTION, SUSPENSION INTRA-ARTICULAR; INTRALESIONAL; INTRAMUSCULAR; SOFT TISSUE at 14:52

## 2021-04-13 RX ORDER — METHYLPREDNISOLONE ACETATE 80 MG/ML
80 INJECTION, SUSPENSION INTRA-ARTICULAR; INTRALESIONAL; INTRAMUSCULAR; SOFT TISSUE
Status: COMPLETED | OUTPATIENT
Start: 2021-04-09 | End: 2021-04-09

## 2021-04-16 ENCOUNTER — BULK ORDERING (OUTPATIENT)
Dept: CASE MANAGEMENT | Facility: OTHER | Age: 31
End: 2021-04-16

## 2021-04-16 DIAGNOSIS — Z23 IMMUNIZATION DUE: ICD-10-CM

## 2021-04-28 ENCOUNTER — TELEPHONE (OUTPATIENT)
Dept: ORTHOPEDIC SURGERY | Facility: CLINIC | Age: 31
End: 2021-04-28

## 2021-04-28 NOTE — TELEPHONE ENCOUNTER
----- Message from Marta Lal MD sent at 4/28/2021 11:12 AM EDT -----  Regarding: FW: Visit Follow-Up Question  Contact: [phone]      ----- Message -----  From: Thania Perez MA  Sent: 4/27/2021   7:49 AM EDT  To: Marta Lal MD  Subject: Visit Follow-Up Question                         Please review    ----- Message -----  From: Shaheen Rivera  Sent: 4/26/2021   6:17 PM EDT  To: Flaquita diana [facility]  Subject: Visit Follow-Up Question                         Dr. Lal my knee pain has came back and maybe even starting to get a little worse , I can really feel the pull at the top of knee now

## 2021-04-28 NOTE — TELEPHONE ENCOUNTER
Spoke with patient.  He is going to call cardiologist to see if he can take off external defib for mri.  He will call us back.

## 2021-05-03 ENCOUNTER — TELEPHONE (OUTPATIENT)
Dept: ORTHOPEDICS | Facility: OTHER | Age: 31
End: 2021-05-03

## 2021-05-19 DIAGNOSIS — S83.511D RUPTURE OF ANTERIOR CRUCIATE LIGAMENT OF RIGHT KNEE, SUBSEQUENT ENCOUNTER: Primary | ICD-10-CM

## 2021-05-21 ENCOUNTER — OFFICE VISIT (OUTPATIENT)
Dept: ORTHOPEDIC SURGERY | Facility: CLINIC | Age: 31
End: 2021-05-21

## 2021-05-21 VITALS — TEMPERATURE: 96.9 F | HEIGHT: 72 IN | WEIGHT: 244.1 LBS | BODY MASS INDEX: 33.06 KG/M2

## 2021-05-21 DIAGNOSIS — S83.241D TEAR OF MEDIAL MENISCUS OF RIGHT KNEE, CURRENT, UNSPECIFIED TEAR TYPE, SUBSEQUENT ENCOUNTER: Primary | ICD-10-CM

## 2021-05-21 PROCEDURE — 99212 OFFICE O/P EST SF 10 MIN: CPT | Performed by: ORTHOPAEDIC SURGERY

## 2021-05-21 RX ORDER — CARVEDILOL 6.25 MG/1
12.5 TABLET ORAL 2 TIMES DAILY WITH MEALS
COMMUNITY
End: 2021-12-28 | Stop reason: SDUPTHER

## 2021-05-21 NOTE — PROGRESS NOTES
"Knee Follow Up      Patient: Shaheen Rivera        YOB: 1990            Chief Complaints: knee pain right      History of Present Illness: This patient is here follow-up of right knee pain I did have a cardiac event and had a monitor on his we cannot get an MRI.  Last saw his cardiologist his ejection fraction gone up to 73 and they removed his heart monitor.  We did order an MRI and currently it is scheduled for June 23 1 month away.  That is unacceptable and we will have that rescheduled at another facility in a more reasonable timeframe      Physical Exam: 30 y.o. male  General Appearance:    Alert, cooperative, in no acute distress                   Vitals:    05/21/21 0827   Temp: 96.9 °F (36.1 °C)   TempSrc: Temporal   Weight: 111 kg (244 lb 1.6 oz)   Height: 182.9 cm (72\")        Patient is alert and read ×3 no acute distress appears her above-listed at height weight and age.  Affect is normal respiratory rate is normal unlabored. Heart rate regular rate rhythm, sclera, dentition and hearing are normal for the purpose of this exam.      Ortho Exam     Physical exam of the right  knee reveals no effusion no redness.  The patient does have tenderness about the medial l joint line.  No tenderness about the lateral l joint line.  A negative bounce home and a positive l medial Kasia.    Patient has a stable ligamentous exam.  The patient has a negative Lachman and negative anterior drawer and a negative pivot shift.  Quads are reasonable and symmetric bilaterally.  Calf is soft and nontender.  There is no overlying skin changes no lymphedema lymphadenopathy.  Patient has good hip range of motion full symmetric and asymptomatic and a normal ankle exam.  She has good distal pulses and sensation distally is intact                Assessment/Plan: Persistent right knee pain I still think this is mechanical he has an MRI scheduled for 1 month away we will try to get that scheduled earlier I will " discuss his results over the phone to save him 1 trip and since he came in today

## 2021-06-03 ENCOUNTER — TELEPHONE (OUTPATIENT)
Dept: ORTHOPEDIC SURGERY | Facility: CLINIC | Age: 31
End: 2021-06-03

## 2021-06-03 NOTE — TELEPHONE ENCOUNTER
----- Message from Marta Lal MD sent at 6/3/2021 11:49 AM EDT -----  Regarding: FW: Test Results Question  Contact: [phone]      ----- Message -----  From: Simón Stoddard APRN  Sent: 6/3/2021   9:54 AM EDT  To: Marta Lal MD  Subject: FW: Test Results Question                        Dr. Lal,    I spoke with patient and he would like to proceed with ACL reconstruction. I asked him to get a letter from his cardiologist so we can proceed.    I did answer some general questions about the procedure but he may want to hear from you as well. I told him I did not think he would need prehab since this was an old injury and he really had compensated well for it over time.   ----- Message -----  From: Marta Lal MD  Sent: 6/3/2021   9:25 AM EDT  To: ALINA Freitas  Subject: FW: Test Results Question                          ----- Message -----  From: Ivanna Toscano MA  Sent: 5/25/2021   3:28 PM EDT  To: Marta Lal MD  Subject: FW: Test Results Question                          ----- Message -----  From: Shaheen Rivera  Sent: 5/25/2021   2:58 PM EDT  To: Flaquita [facility]  Subject: Test Results Question                            Spoke to Cardiologist about the knee operation he said he was fine with me going ahead on the surgery.  But I had a couple of questions about the process

## 2021-06-03 NOTE — PROGRESS NOTES
Patient: Shaheen Rivera  YOB: 1990  Date of Service: 6/3/2021    Chief Complaints: Right knee pain MRI follow-up    Subjective:    History of Present Illness: Pt is seen in the office today with complaints of right knee pain here follow-up MRI.  MRI demonstrates an attenuated ACL with anterior translation of the tibia suggestive of an incompetent ACL.  He does feel like his knee is unstable he wishes to have it reconstructed.  He wants to wait till August to get work stuff done.  Couple that with the fact that he has had a recent heart event going into A. fib and is currently still on blood thinners.  His last ejection fraction was 47.  I told him all this still really makes me nervous and I would like to give his heart time, more time to recover he is to see his cardiologist in August I will try to call his cardiologist as well.          Allergies: No Known Allergies    Medications:   Home Medications:  Current Outpatient Medications on File Prior to Visit   Medication Sig   • acyclovir (ZOVIRAX) 200 MG capsule Take 1 capsule by mouth 2 (Two) Times a Day.   • anastrozole (ARIMIDEX) 1 MG tablet Take 1 mg by mouth Daily.   • apixaban (ELIQUIS) 5 MG tablet tablet Take 5 mg by mouth 2 (two) times a day.   • carvedilol (COREG) 6.25 MG tablet Take 6.25 mg by mouth 2 (Two) Times a Day With Meals.   • Cyanocobalamin (B-12 COMPLIANCE INJECTION) 1000 MCG/ML kit Inject 1,000 mcg as directed Every 30 (Thirty) Days.   • digoxin (LANOXIN) 125 MCG tablet Take 125 mcg by mouth Daily.   • FOLIC ACID PO Take  by mouth.   • furosemide (LASIX) 40 MG tablet 40 mg 2 (Two) Times a Day.   • lisdexamfetamine (VYVANSE) 20 MG capsule Take 20 mg by mouth Every Morning Weaning off    • LORazepam (ATIVAN) 0.5 MG tablet Take 1 tablet by mouth 2 (Two) Times a Day.   • metoprolol tartrate (LOPRESSOR) 100 MG tablet Take 1 tablet by mouth 2 (two) times a day.   • spironolactone (ALDACTONE) 25 MG tablet Take 25 mg by mouth Daily.    • Testosterone Cypionate (DEPOTESTOTERONE CYPIONATE) 200 MG/ML injection Inject 1 mL into the shoulder, thigh, or buttocks Every 7 (Seven) Days.     No current facility-administered medications on file prior to visit.     Current Medications:  Scheduled Meds:  Continuous Infusions:No current facility-administered medications for this visit.    PRN Meds:.    I have reviewed the patient's medical history in detail and updated the computerized patient record.  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[employer])   Tobacco Use   • Smoking status: Never Smoker   • Smokeless tobacco: Never Used   Substance and Sexual Activity   • Alcohol use: Yes     Comment: OCCASIONAL    • Drug use: No     Comment: former casual marijuana use (more than 2 years ago)   • Sexual activity: Not on file      Social History     Social History Narrative   • Not on file        Family History   Problem Relation Age of Onset   • Lung cancer Mother 49        smoker   • Graves' disease Mother    • Lung cancer Father 52        smoker   • No Known Problems Brother    • Coronary artery disease Paternal Grandmother    • Thyroid disease Paternal Grandmother    • Suicidality Maternal Uncle         committed suicide       ROS: 14 point review of systems was performed and was negative except for documented findings in HPI and today's encounter.     Allergies: No Known Allergies  Constitutional:  Denies fever, shaking or chills   Eyes:  Denies change in visual acuity   HENT:  Denies nasal congestion or sore  throat   Respiratory:  Denies cough or shortness of breath   Cardiovascular:  Denies chest pain or severe LE edema   GI:  Denies abdominal pain, nausea, vomiting, bloody stools or diarrhea   Musculoskeletal:  Numbness, tingling, or loss of motor function only as noted above in history of present illness.  : Denies painful urination or hematuria  Integument:  Denies rash, lesion or ulceration   Neurologic:  Denies headache or focal weakness  Endocrine:  Denies lymphadenopathy  Psych:  Denies confusion or change in mental status   Hem:  Denies active bleeding      Physical Exam: 30 y.o. male  Wt Readings from Last 3 Encounters:   05/21/21 111 kg (244 lb 1.6 oz)   04/09/21 110 kg (242 lb)   01/06/21 112 kg (248 lb)       There is no height or weight on file to calculate BMI.  No height and weight on file for this encounter.  There were no vitals filed for this visit.  Vital signs reviewed.   General Appearance:    Alert, cooperative, in no acute distress                    Ortho exam    physical exam of the right knee he has no overlying skin changes no lymphedema lymphadenopathy they have no effusion is full range of motion they have some mild tenderness laterally no tenderness medially they have pain with bounce home no pain with Kasia he has a positive Lockman.  I do not get a pivot shift however they have a lot of hamstring guarding.  Quads are good calf is soft and nontender there is no overlying skin changes, s good hip range of motion and normal ankle exam      MRIs as above have reviewed the films myself and agree with the findings       .time    Assessment: Right knee ACL tear.  I think at his age he is active he does need reconstructed I just will feel a little better getting a little further away from his cardiac event get his ejection fraction up maybe even getting him off blood thinners.  I will talk to his cardiologist he is to see his cardiologist again in August we will at the very least wait till  then.  I will reevaluate him in 1 month peer we will start him into some physical therapy    Plan:   Follow up as indicated.  Ice, elevate, and rest as needed.  Discussed conservative measures of pain control including ice, bracing.  Also talked about the importance of strengthening and maintaining ideal body weight    Marta Lal M.D.

## 2021-06-04 ENCOUNTER — OFFICE VISIT (OUTPATIENT)
Dept: ORTHOPEDIC SURGERY | Facility: CLINIC | Age: 31
End: 2021-06-04

## 2021-06-04 VITALS — HEIGHT: 71 IN | BODY MASS INDEX: 34.16 KG/M2 | TEMPERATURE: 96.9 F | WEIGHT: 244 LBS

## 2021-06-04 DIAGNOSIS — S83.511A RUPTURE OF ANTERIOR CRUCIATE LIGAMENT OF RIGHT KNEE, INITIAL ENCOUNTER: Primary | ICD-10-CM

## 2021-06-04 PROCEDURE — 99213 OFFICE O/P EST LOW 20 MIN: CPT | Performed by: ORTHOPAEDIC SURGERY

## 2021-06-10 ENCOUNTER — TREATMENT (OUTPATIENT)
Dept: PHYSICAL THERAPY | Facility: CLINIC | Age: 31
End: 2021-06-10

## 2021-06-10 DIAGNOSIS — M25.561 RIGHT KNEE PAIN, UNSPECIFIED CHRONICITY: Primary | ICD-10-CM

## 2021-06-10 DIAGNOSIS — S83.511D RUPTURE OF ANTERIOR CRUCIATE LIGAMENT OF RIGHT KNEE, SUBSEQUENT ENCOUNTER: ICD-10-CM

## 2021-06-10 PROCEDURE — 97110 THERAPEUTIC EXERCISES: CPT | Performed by: PHYSICAL THERAPIST

## 2021-06-10 PROCEDURE — 97162 PT EVAL MOD COMPLEX 30 MIN: CPT | Performed by: PHYSICAL THERAPIST

## 2021-06-10 NOTE — PROGRESS NOTES
Physical Therapy Initial Evaluation and Plan of Care      Patient: Shaheen Rivera   : 1990  Diagnosis/ICD-10 Code:  Right knee pain, unspecified chronicity [M25.561]  Referring practitioner: Marta Lal MD  Date of Initial Visit: 6/10/2021  Today's Date: 6/10/2021  Patient seen for 1 sessions           Subjective Evaluation    History of Present Illness  Date of onset: 9/10/2020  Mechanism of injury: Pt was getting out of a semi truck when he slipped. Tried to catch himself and landed on the one leg. He was really busy at work, so just let it go. He had a DVT and A-fib on . He was in ICU for a week/10 days then underwent a cardioversion on . Since the hospital stay and resting after his procedure, he has had more buckling in the knee and pain. History R hamstring tear in HS playing football. He has had an injection in the knee. Due to the heart issue and being on Eliquis, they are looking at having surgery maybe in August.       Patient Occupation: semi truck salesman Quality of life: good    Pain  Current pain ratin  At worst pain ratin  Location: R knee  Quality: dull ache  Aggravating factors: stairs, squatting and ambulation (kneeling)    Social Support  Lives in: one-story house (steps up to the home)  Lives with: friend.    Diagnostic Tests  MRI studies: abnormal    Treatments  Previous treatment: injection treatment  Patient Goals  Patient goals for therapy: decreased pain and increased strength  Patient goal: prepped for surgery           Objective          Static Posture     Ankle/Foot   Ankle/Foot (Left): Pes planus.   Ankle/Foot (Right): Pes planus.     Active Range of Motion   Left Knee   Flexion: 128 (seated) degrees   Extension: 3 (of HE) degrees     Right Knee   Flexion: 105 (seated) degrees   Extension: 2 (of HE) degrees     Strength/Myotome Testing     Left Hip   Planes of Motion   Flexion: 5  Abduction: 5  Adduction: 5  External rotation: 5    Right Hip   Planes  of Motion   Flexion: 4+  Abduction: 4  Adduction: 5  External rotation: 4+    Left Knee   Flexion: 5  Extension: 5    Right Knee   Flexion: 4+  Extension: 4+        Exercises:  -quad set  -SLR and SL hip abd  -bridge  -hip abd/ER with blue band  -hamstring curl, blue band  -hamstring and calf stretch  -discussed surgery and rehab after. Talked about gym machines that he should do. Recommended custom orthotics post op.       Functional Outcome Score:   Knee Outcome Survey=53.75%        Assessment & Plan     Assessment  Impairments: abnormal or restricted ROM, activity intolerance, impaired physical strength, lacks appropriate home exercise program and pain with function  Assessment details: Shaheen Rivera is a 30 y.o. year-old male referred to physical therapy for R knee pain. He presents with a evolving clinical presentation.  He has comorbidities of R ACL tear and personal factors of a recent DVT with A-fib that may affect his progress in the plan of care.Pt has decreased ROM, strength of the knee and hip, and flexibility.  Pt will benefit from therapy to improve this deficits prior to knee surgery.       Prognosis: good  Functional Limitations: walking, uncomfortable because of pain and standing  Goals  Plan Goals: ST. Pt will be educated on surgery preparation, transfers, and gait prior to ACL surgery   2. Pt will be independent with a HEP for ROM, flexibility, and strength to help improve surgical outcomes    Plan  Therapy options: will be seen for skilled physical therapy services  Planned modality interventions: cryotherapy, TENS, thermotherapy (paraffin bath) and ultrasound  Other planned modality interventions: aquatic therapy  Planned therapy interventions: body mechanics training, flexibility, functional ROM exercises, gait training, home exercise program, manual therapy, neuromuscular re-education, strengthening, stretching, therapeutic activities and transfer training  Frequency: 1x week  Duration  in weeks: 8  Treatment plan discussed with: patient  Plan details: Plan to see for 1-2 visits to progress to I HEP        Timed:  Manual Therapy:         mins  86545;  Therapeutic Exercise:    20     mins  82346;     Neuromuscular Soumya:        mins  49201;    Therapeutic Activity:          mins  54907;     Gait Training:           mins  21215;     Ultrasound:          mins  19710;    Electrical Stimulation:         mins  60875 ( );  Iontophoresis         mins 44756  Dry Needling        mins      Untimed:  Electrical Stimulation:         mins  61554 ( );  Mechanical Traction:         mins  95759;     Timed Treatment:   20   mins   Total Treatment:     45   mins    PT SIGNATURE: Khushi Garcia, PT   DATE TREATMENT INITIATED: 6/10/2021    Initial Certification  Certification Period: 9/8/2021  I certify that the therapy services are furnished while this patient is under my care.  The services outlined above are required by this patient, and will be reviewed every 90 days.     PHYSICIAN: Marta Lal MD      DATE:     Please sign and return via fax to [phone] Thank you, [facility].

## 2021-06-21 ENCOUNTER — APPOINTMENT (OUTPATIENT)
Dept: MRI IMAGING | Facility: HOSPITAL | Age: 31
End: 2021-06-21

## 2021-07-09 ENCOUNTER — OFFICE VISIT (OUTPATIENT)
Dept: ORTHOPEDIC SURGERY | Facility: CLINIC | Age: 31
End: 2021-07-09

## 2021-07-09 ENCOUNTER — TELEPHONE (OUTPATIENT)
Dept: ORTHOPEDIC SURGERY | Facility: CLINIC | Age: 31
End: 2021-07-09

## 2021-07-09 VITALS — HEIGHT: 71 IN | BODY MASS INDEX: 34.16 KG/M2 | WEIGHT: 244 LBS | TEMPERATURE: 96.4 F

## 2021-07-09 DIAGNOSIS — S83.511D RUPTURE OF ANTERIOR CRUCIATE LIGAMENT OF RIGHT KNEE, SUBSEQUENT ENCOUNTER: Primary | ICD-10-CM

## 2021-07-09 PROCEDURE — 99213 OFFICE O/P EST LOW 20 MIN: CPT | Performed by: ORTHOPAEDIC SURGERY

## 2021-07-09 RX ORDER — AMIODARONE HYDROCHLORIDE 200 MG/1
TABLET ORAL
COMMUNITY
Start: 2021-07-07 | End: 2021-09-14

## 2021-07-09 RX ORDER — CEFAZOLIN SODIUM 2 G/100ML
2 INJECTION, SOLUTION INTRAVENOUS ONCE
Status: CANCELLED | OUTPATIENT
Start: 2021-09-21 | End: 2021-07-09

## 2021-07-09 RX ORDER — LOSARTAN POTASSIUM 25 MG/1
25 TABLET ORAL 2 TIMES DAILY
COMMUNITY
Start: 2021-06-23 | End: 2021-12-28

## 2021-07-09 NOTE — PROGRESS NOTES
"Knee Follow Up      Patient: Shaheen Rivera        YOB: 1990            Chief Complaints: knee pain right      History of Present Illness: Patient is here follow-up right knee pain he has a chronically torn ACL.  He wishes to get this reconstructed however he recently had cardiac event of acute onset of A. fib and congestive heart failure he is currently still on anticoagulants.  Told him we can reconstruct him but we need cardiac clearance.  He sees his cardiologist in August      Physical Exam: 30 y.o. male  General Appearance:    Alert, cooperative, in no acute distress                   Vitals:    07/09/21 1350   Temp: 96.4 °F (35.8 °C)   Weight: 111 kg (244 lb)   Height: 180.3 cm (71\")   PainSc:   2        Patient is alert and read ×3 no acute distress appears her above-listed at height weight and age.  Affect is normal respiratory rate is normal unlabored. Heart rate regular rate rhythm, sclera, dentition and hearing are normal for the purpose of this exam.      Ortho Exam   physical exam of the right knee he has no overlying skin changes no lymphedema lymphadenopathy they have no effusion is full range of motion they have some mild tenderness laterally no tenderness medially they have pain with bounce home no pain with Kasia he has a positive Lockman.  I do not get a pivot shift however they have a lot of hamstring guarding.  Quads are good calf is soft and nontender there is no overlying skin changes, s good hip range of motion and normal ankle exam    Physical Exam: 30 y.o. male  General Appearance:    Alert, cooperative, in no acute distress                      Vitals:    07/09/21 1350   Temp: 96.4 °F (35.8 °C)   Weight: 111 kg (244 lb)   Height: 180.3 cm (71\")   PainSc:   2        Head:    Normocephalic, without obvious abnormality, atraumatic   Eyes:            conjunctivae and sclerae normal, no pallor, corneas clear,    Ears:    Ears appear intact with no abnormalities noted "   Throat:   No oral lesions, no thrush, oral mucosa moist   Neck:   No adenopathy, supple, trachea midline, no thyromegaly,    Back:     No kyphosis present, no scoliosis present, no skin lesions,      erythema or scars, no tenderness to percussion or                   palpation,   range of motion normal   Lungs:     Clear to auscultation,respirations regular, even and                  unlabored    Heart:    Regular rhythm and normal rate   he is currently in sinus            Chest Wall:    No abnormalities observed               Pulses:   Pulses palpable and equal bilaterally   Skin:   No bleeding, bruising or rash   Lymph nodes:   No palpable adenopathy   Neurologic:   Appears neurologic intact                 Assessment/Plan:      Right knee ACL tear we can reconstruct it.  His heart history does make me a little nervous I will need to complete cardiac clearance from his cardiologist.  He would obviously need to come off his anticoagulant he has an appointment with them in August and they will make that determination there.  I told him that this is not an urgent or emergent surgical matter

## 2021-07-09 NOTE — TELEPHONE ENCOUNTER
Have spoken with the cardiologist office.  Have informed them that the patient is in need of an ACL reconstruction and that we are requesting cardiac clearance.  Also he is on Eliquis for A. fib and need information as to when to stop the Eliquis prior to her surgery.  Patient does have a history of systolic heart failure and atrial fib.  He was last seen by cardiologist in May and was instructed to come back in 3 months.  The office states that the physician will not clear him until after his appointment with him in August.  Patient is scheduled to be seen on August 17 and at that time they will address the cardiac clearance as well as his Eliquis.  I have also faxed over a surgery clearance request form to [phone] per JARON

## 2021-08-10 ENCOUNTER — DOCUMENTATION (OUTPATIENT)
Dept: PHYSICAL THERAPY | Facility: CLINIC | Age: 31
End: 2021-08-10

## 2021-08-10 DIAGNOSIS — S83.511D RUPTURE OF ANTERIOR CRUCIATE LIGAMENT OF RIGHT KNEE, SUBSEQUENT ENCOUNTER: ICD-10-CM

## 2021-08-10 DIAGNOSIS — M25.561 RIGHT KNEE PAIN, UNSPECIFIED CHRONICITY: Primary | ICD-10-CM

## 2021-08-10 NOTE — PROGRESS NOTES
Closure of Physical Therapy Encounter     Shaheen Rivera was seen for 1 physical therapy sessions for R knee pain with ACL tear.  Treatment included therapeutic exercise and patient education with home exercise program . Progress to physical therapy goals was good.  He was discharged to an independent [facility] and provided patient education to self-manage condition. He is currently waiting on clearance from cardiology to have his reconstruction      Khushi Garcia, PT  Physical Therapist

## 2021-08-31 ENCOUNTER — TELEPHONE (OUTPATIENT)
Dept: ORTHOPEDIC SURGERY | Facility: CLINIC | Age: 31
End: 2021-08-31

## 2021-08-31 NOTE — TELEPHONE ENCOUNTER
Caller: SKIP LINARES    Relationship to patient: SELF    Best call back number:     Additional notes:PATIENT WANTS TO KNOW WHEN SURGERY DATE WILL BE.  I ADVISED THE PATIENT THE ORDER IS IN FOR THE SX,BUT NO ACTUAL DATE IS SOWING, AS OF YET.  PATIENT REQUESTS CALL BACK.

## 2021-09-01 PROBLEM — S83.511A RUPTURE OF ANTERIOR CRUCIATE LIGAMENT OF RIGHT KNEE: Status: ACTIVE | Noted: 2021-09-01

## 2021-09-01 NOTE — TELEPHONE ENCOUNTER
Patient did get call from our office  To schedule surgery.  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  He was wondering how long to expect to be out of work with a more sendentary job post op.

## 2021-09-02 NOTE — TELEPHONE ENCOUNTER
Patient has seen a cardiologist.  There is clearance in the chart.  Patient is high risk.  There is also a note stating it is okay to stop his Eliquis.  Patient has been told to discontinue the Eliquis 48 hours prior to surgery.

## 2021-09-03 NOTE — TELEPHONE ENCOUNTER
No but I think he had a question of how long he would be out of work from her sedentary job I would tell him 2 to 3 weeks

## 2021-09-07 ENCOUNTER — TRANSCRIBE ORDERS (OUTPATIENT)
Dept: PREADMISSION TESTING | Facility: HOSPITAL | Age: 31
End: 2021-09-07

## 2021-09-14 ENCOUNTER — PRE-ADMISSION TESTING (OUTPATIENT)
Dept: PREADMISSION TESTING | Facility: HOSPITAL | Age: 31
End: 2021-09-14

## 2021-09-14 VITALS
HEIGHT: 71 IN | RESPIRATION RATE: 16 BRPM | OXYGEN SATURATION: 97 % | TEMPERATURE: 98.2 F | HEART RATE: 57 BPM | BODY MASS INDEX: 34.71 KG/M2 | SYSTOLIC BLOOD PRESSURE: 92 MMHG | DIASTOLIC BLOOD PRESSURE: 59 MMHG | WEIGHT: 247.9 LBS

## 2021-09-14 LAB
ANION GAP SERPL CALCULATED.3IONS-SCNC: 11.4 MMOL/L (ref 5–15)
BUN SERPL-MCNC: 17 MG/DL (ref 6–20)
BUN/CREAT SERPL: 16.3 (ref 7–25)
CALCIUM SPEC-SCNC: 9.7 MG/DL (ref 8.6–10.5)
CHLORIDE SERPL-SCNC: 99 MMOL/L (ref 98–107)
CO2 SERPL-SCNC: 26.6 MMOL/L (ref 22–29)
CREAT SERPL-MCNC: 1.04 MG/DL (ref 0.76–1.27)
DEPRECATED RDW RBC AUTO: 45.5 FL (ref 37–54)
ERYTHROCYTE [DISTWIDTH] IN BLOOD BY AUTOMATED COUNT: 13.3 % (ref 12.3–15.4)
GFR SERPL CREATININE-BSD FRML MDRD: 84 ML/MIN/1.73
GLUCOSE SERPL-MCNC: 99 MG/DL (ref 65–99)
HCT VFR BLD AUTO: 53.7 % (ref 37.5–51)
HGB BLD-MCNC: 17.3 G/DL (ref 13–17.7)
MCH RBC QN AUTO: 30 PG (ref 26.6–33)
MCHC RBC AUTO-ENTMCNC: 32.2 G/DL (ref 31.5–35.7)
MCV RBC AUTO: 93.2 FL (ref 79–97)
PLATELET # BLD AUTO: 219 10*3/MM3 (ref 140–450)
PMV BLD AUTO: 10 FL (ref 6–12)
POTASSIUM SERPL-SCNC: 3.9 MMOL/L (ref 3.5–5.2)
RBC # BLD AUTO: 5.76 10*6/MM3 (ref 4.14–5.8)
SODIUM SERPL-SCNC: 137 MMOL/L (ref 136–145)
WBC # BLD AUTO: 6.21 10*3/MM3 (ref 3.4–10.8)

## 2021-09-14 PROCEDURE — 80048 BASIC METABOLIC PNL TOTAL CA: CPT

## 2021-09-14 PROCEDURE — 85027 COMPLETE CBC AUTOMATED: CPT

## 2021-09-14 PROCEDURE — 36415 COLL VENOUS BLD VENIPUNCTURE: CPT

## 2021-09-14 NOTE — DISCHARGE INSTRUCTIONS
Take the following medications the morning of surgery:    CARVEDILOL  ARIMIDEX    ARRIVE AT 0530.      If you are on prescription narcotic pain medication to control your pain you may also take that medication the morning of surgery.    General Instructions:  • Do not eat solid food after midnight the night before surgery.  • You may drink clear liquids day of surgery but must stop at least one hour before your hospital arrival time.  • It is beneficial for you to have a clear drink that contains carbohydrates the day of surgery.  We suggest a 12 to 20 ounce bottle of Gatorade or Powerade for non-diabetic patients or a 12 to 20 ounce bottle of G2 or Powerade Zero for diabetic patients. (Pediatric patients, are not advised to drink a 12 to 20 ounce carbohydrate drink)    Clear liquids are liquids you can see through.  Nothing red in color.     Plain water                               Sports drinks  Sodas                                   Gelatin (Jell-O)  Fruit juices without pulp such as white grape juice and apple juice  Popsicles that contain no fruit or yogurt  Tea or coffee (no cream or milk added)  Gatorade / Powerade  G2 / Powerade Zero    • Infants may have breast milk up to four hours before surgery.  • Infants drinking formula may drink formula up to six hours before surgery.   • Patients who avoid smoking, chewing tobacco and alcohol for 4 weeks prior to surgery have a reduced risk of post-operative complications.  Quit smoking as many days before surgery as you can.  • Do not smoke, use chewing tobacco or drink alcohol the day of surgery.   • If applicable bring your C-PAP/ BI-PAP machine.  • Bring any papers given to you in the doctor’s office.  • Wear clean comfortable clothes.  • Do not wear contact lenses, false eyelashes or make-up.  Bring a case for your glasses.   • Bring crutches or walker if applicable.  • Remove all piercings.  Leave jewelry and any other valuables at home.  • Hair extensions  with metal clips must be removed prior to surgery.  • The Pre-Admission Testing nurse will instruct you to bring medications if unable to obtain an accurate list in Pre-Admission Testing.        If you were given a blood bank ID arm band remember to bring it with you the day of surgery.    Preventing a Surgical Site Infection:  • For 2 to 3 days before surgery, avoid shaving with a razor because the razor can irritate skin and make it easier to develop an infection.    • Any areas of open skin can increase the risk of a post-operative wound infection by allowing bacteria to enter and travel throughout the body.  Notify your surgeon if you have any skin wounds / rashes even if it is not near the expected surgical site.  The area will need assessed to determine if surgery should be delayed until it is healed.  • The night prior to surgery shower using a fresh bar of anti-bacterial soap (such as Dial) and clean washcloth.  Sleep in a clean bed with clean clothing.  Do not allow pets to sleep with you.  • Shower on the morning of surgery using a fresh bar of anti-bacterial soap (such as Dial) and clean washcloth.  Dry with a clean towel and dress in clean clothing.  • Ask your surgeon if you will be receiving antibiotics prior to surgery.  • Make sure you, your family, and all healthcare providers clean their hands with soap and water or an alcohol based hand  before caring for you or your wound.    Day of surgery:  Your arrival time is approximately two hours before your scheduled surgery time.  Upon arrival, a Pre-op nurse and Anesthesiologist will review your health history, obtain vital signs, and answer questions you may have.  The only belongings needed at this time will be a list of your home medications and if applicable your C-PAP/BI-PAP machine.  A Pre-op nurse will start an IV and you may receive medication in preparation for surgery, including something to help you relax.     Please be aware that  surgery does come with discomfort.  We want to make every effort to control your discomfort so please discuss any uncontrolled symptoms with your nurse.   Your doctor will most likely have prescribed pain medications.      If you are going home after surgery you will receive individualized written care instructions before being discharged.  A responsible adult must drive you to and from the hospital on the day of your surgery and stay with you for 24 hours.  Discharge prescriptions can be filled by the hospital pharmacy during regular pharmacy hours.  If you are having surgery late in the day/evening your prescription may be e-prescribed to your pharmacy.  Please verify your pharmacy hours or chose a 24 hour pharmacy to avoid not having access to your prescription because your pharmacy has closed for the day.    If you are staying overnight following surgery, you will be transported to your hospital room following the recovery period.  [facility] has all private rooms.    If you have any questions please call Pre-Admission Testing at [phone].  Deductibles and co-payments are collected on the day of service. Please be prepared to pay the required co-pay, deductible or deposit on the day of service as defined by your plan.    Patient Education for Self-Quarantine Process    • Following your COVID testing, we strongly recommend that you wear a mask when you are with other people and practice social distancing.   • Limit your activities to only required outings.  • Wash your hands with soap and water frequently for at least 20 seconds.   • Avoid touching your eyes, nose and mouth with unwashed hands.  • Do not share anything - utensils, drinking glasses, food from the same bowl.   • Sanitize household surfaces daily. Include all high touch areas (door handles, light switches, phones, countertops, etc.)    Call your surgeon immediately if you experience any of the following symptoms:  • Sore  Throat  • Shortness of Breath or difficulty breathing  • Cough  • Chills  • Body soreness or muscle pain  • Headache  • Fever  • New loss of taste or smell  • Do not arrive for your surgery ill.  Your procedure will need to be rescheduled to another time.  You will need to call your physician before the day of surgery to avoid any unnecessary exposure to hospital staff as well as other patients.

## 2021-09-16 NOTE — PROGRESS NOTES
Patient: Shaheen Rivera  YOB: 1990  Date of Service: 9/16/2021    Chief Complaints: Right knee pain    Subjective:    History of Present Illness: Pt is seen in the office today with complaints of right knee pain he has a known ACL tear that is chronic he wishes to proceed with reconstruction at his age I recommend an autograft.  He did have a pretty significant cardiac event in February from according the patient ADHD medication.  His ejection fraction was quite low but it has improved some.  Cardiology has given him the okay for surgery but is placed him at higher risk than patients his age.  I did tell the patient that he is a bit concerned about this increased risk..          Allergies: No Known Allergies    Medications:   Home Medications:  Current Outpatient Medications on File Prior to Visit   Medication Sig   • acyclovir (ZOVIRAX) 200 MG capsule Take 1 capsule by mouth 2 (Two) Times a Day.   • anastrozole (ARIMIDEX) 1 MG tablet Take 1 mg by mouth Daily.   • apixaban (ELIQUIS) 5 MG tablet tablet Take 5 mg by mouth 2 (two) times a day. HOLD PER MD INSTR   • carvedilol (COREG) 6.25 MG tablet Take 12.5 mg by mouth 2 (Two) Times a Day With Meals.   • furosemide (LASIX) 40 MG tablet 40 mg 2 (Two) Times a Day.   • losartan (COZAAR) 25 MG tablet Take 25 mg by mouth 2 (Two) Times a Day.   • spironolactone (ALDACTONE) 25 MG tablet Take 25 mg by mouth Daily.   • Testosterone Cypionate (DEPOTESTOTERONE CYPIONATE) 200 MG/ML injection Inject 1 mL into the shoulder, thigh, or buttocks Every 7 (Seven) Days.     No current facility-administered medications on file prior to visit.     Current Medications:  Scheduled Meds:  Continuous Infusions:No current facility-administered medications for this visit.    PRN Meds:.    I have reviewed the patient's medical history in detail and updated the computerized patient record.  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[employer])   Tobacco Use   • Smoking status: Never Smoker   • Smokeless tobacco: Never Used   Substance and Sexual Activity   • Alcohol use: Yes     Comment: OCCASIONAL    • Drug use: No     Comment: former casual marijuana use (more than 2 years ago)   • Sexual activity: Not on file      Social History     Social History Narrative   • Not on file        Family History   Problem Relation Age of Onset   • Lung cancer Mother 49        smoker   • Graves' disease Mother    • Lung cancer Father 52        smoker   • No Known Problems Brother    • Coronary artery disease Paternal Grandmother    • Thyroid disease Paternal Grandmother    • Suicidality Maternal Uncle         committed suicide   • Malig Hyperthermia Neg Hx        ROS: 14 point review of systems was performed and was negative except for documented findings in HPI and today's encounter.     Allergies: No Known Allergies  Constitutional:  Denies fever, shaking or chills   Eyes:  Denies change in visual acuity   HENT:  Denies nasal congestion or sore throat   Respiratory:  Denies cough or shortness of breath   Cardiovascular:  Denies chest pain or severe LE edema   GI:  Denies abdominal pain, nausea, vomiting, bloody stools or diarrhea   Musculoskeletal:  Numbness, tingling, or loss of motor function only as noted above in history of present illness.  : Denies painful urination or hematuria  Integument:  Denies rash, lesion or ulceration   Neurologic:  Denies headache or focal weakness  Endocrine:  Denies  "lymphadenopathy  Psych:  Denies confusion or change in mental status   Hem:  Denies active bleeding      Physical Exam: 31 y.o. male  Wt Readings from Last 3 Encounters:   09/14/21 112 kg (247 lb 14.4 oz)   07/09/21 111 kg (244 lb)   06/04/21 111 kg (244 lb)       There is no height or weight on file to calculate BMI.  No height and weight on file for this encounter.  There were no vitals filed for this visit.  Vital signs reviewed.   General Appearance:    Alert, cooperative, in no acute distress                    Ortho exam    physical exam of the right knee he has no overlying skin changes no lymphedema lymphadenopathy they have no effusion is full range of motion they have some mild tenderness laterally no tenderness medially they have pain with bounce home no pain with Kasia he has a positive Lockman.  I do not get a pivot shift however they have a lot of hamstring guarding.  Quads are good calf is soft and nontender there is no overlying skin changes, s good hip range of motion and normal ankle exam    Physical Exam: 31 y.o. male  General Appearance:    Alert, cooperative, in no acute distress                      Vitals:    09/17/21 0959   Temp: 96.4 °F (35.8 °C)   TempSrc: Temporal   Weight: 112 kg (248 lb)   Height: 180.3 cm (71\")        Head:    Normocephalic, without obvious abnormality, atraumatic   Eyes:            conjunctivae and sclerae normal, no pallor, corneas clear,    Ears:    Ears appear intact with no abnormalities noted   Throat:   No oral lesions, no thrush, oral mucosa moist   Neck:   No adenopathy, supple, trachea midline, no thyromegaly,    Back:     No kyphosis present, no scoliosis present, no skin lesions,      erythema or scars, no tenderness to percussion or                   palpation,   range of motion normal   Lungs:     Clear to auscultation,respirations regular, even and                  unlabored    Heart:    Regular rhythm and normal rate               Chest Wall:    No " abnormalities observed               Pulses:   Pulses palpable and equal bilaterally   Skin:   No bleeding, bruising or rash   Lymph nodes:   No palpable adenopathy   Neurologic:   Appears neurologic intact              .time    Assessment: Right knee ACL tear which is chronic plan is to proceed with reconstruction.  Again, he and I discussed cardiology statement if he is at increased risk.  I told the patient I am unsure if his risk decreases the further away he gets from his event he is going to talk with his cardiologist himself.  After the visit we also spoke with the cardiologist, and that did, I did state he is definitely at higher risk and he is unsure of how or when that risk will decrease in his heart will improve he does state he is clear to proceed if the patient is symptomatic and needs to have this done we did talk about risk benefits and alternatives  The patient voiced understanding of the risks, benefits, and alternative forms of treatment that were discussed and the patient consents to proceed with the above listed surgery.  All risks, benefits and alternatives were discussed.  Risks including to but not exclusive to anesthetic complications, including death, MI, CVA, infection, bleeding DVT, fracture, residual pain and need for future surgery.  He understands and agrees to proceed    Plan:   Follow up as indicated.  RAYRAY Lal M.D.

## 2021-09-17 ENCOUNTER — OFFICE VISIT (OUTPATIENT)
Dept: ORTHOPEDIC SURGERY | Facility: CLINIC | Age: 31
End: 2021-09-17

## 2021-09-17 VITALS — TEMPERATURE: 96.4 F | BODY MASS INDEX: 34.72 KG/M2 | HEIGHT: 71 IN | WEIGHT: 248 LBS

## 2021-09-17 DIAGNOSIS — S83.511D RUPTURE OF ANTERIOR CRUCIATE LIGAMENT OF RIGHT KNEE, SUBSEQUENT ENCOUNTER: Primary | ICD-10-CM

## 2021-09-17 PROCEDURE — 99213 OFFICE O/P EST LOW 20 MIN: CPT | Performed by: ORTHOPAEDIC SURGERY

## 2021-09-18 ENCOUNTER — LAB (OUTPATIENT)
Dept: LAB | Facility: HOSPITAL | Age: 31
End: 2021-09-18

## 2021-09-18 LAB — SARS-COV-2 ORF1AB RESP QL NAA+PROBE: NOT DETECTED

## 2021-09-18 PROCEDURE — U0004 COV-19 TEST NON-CDC HGH THRU: HCPCS

## 2021-09-18 PROCEDURE — C9803 HOPD COVID-19 SPEC COLLECT: HCPCS

## 2021-09-20 ENCOUNTER — TELEPHONE (OUTPATIENT)
Dept: ORTHOPEDIC SURGERY | Facility: CLINIC | Age: 31
End: 2021-09-20

## 2021-09-20 NOTE — TELEPHONE ENCOUNTER
Have contacted the patient regarding surgery for tomorrow.  Patient has a history of chronic systolic heart failure, atrial fib which is managed with Eliquis.  Ejection fraction initially was 23% and is now up to 43%.  Patient has been cleared by cardiology although he does have high risk.  I have explained to him that Dr. Lal is concerned about his increased risk since this is an elective procedure.  Patient is still having some knee pain but is doing okay.  During the discussion wanted to make sure that he was aware of the risks.  Have advised him that we can go ahead and proceed with surgery tomorrow as long as he understands the risk and benefits.  Or the surgery could be postponed to a later date.  Patient wants to think it over and then call me back

## 2021-09-20 NOTE — TELEPHONE ENCOUNTER
Patient returned my call.  He is decided to postpone his surgery for tomorrow.  He is gone ahead and made an appointment with his cardiologist on October 28 and will discuss with him in more detail about his risks for surgery.  In the meantime he would like to have Cates she contact him in the office to discuss exercises and things that he could do for his knee until he does decide to have surgery

## 2021-09-20 NOTE — TELEPHONE ENCOUNTER
We can have him see physical therapy to really get going on aggressive quad hamstring and core strengthening thank you for your help with this patient.  Quite honestly, I have been a bit nervous about him

## 2021-09-20 NOTE — TELEPHONE ENCOUNTER
Call returned to the patient.  I was unable to reach him but I did leave a message that Dr. Lal would recommend physical therapy for aggressive quad, hamstring and core strengthening.  Have left it open for him to call if he has any questions or concerns.  Have also forwarded a message to PT with this information

## 2021-10-01 ENCOUNTER — TREATMENT (OUTPATIENT)
Dept: PHYSICAL THERAPY | Facility: CLINIC | Age: 31
End: 2021-10-01

## 2021-10-01 DIAGNOSIS — M21.42 PES PLANUS OF BOTH FEET: ICD-10-CM

## 2021-10-01 DIAGNOSIS — M25.561 RIGHT KNEE PAIN, UNSPECIFIED CHRONICITY: Primary | ICD-10-CM

## 2021-10-01 DIAGNOSIS — S83.511D RUPTURE OF ANTERIOR CRUCIATE LIGAMENT OF RIGHT KNEE, SUBSEQUENT ENCOUNTER: ICD-10-CM

## 2021-10-01 DIAGNOSIS — M21.41 PES PLANUS OF BOTH FEET: ICD-10-CM

## 2021-10-01 PROCEDURE — 97110 THERAPEUTIC EXERCISES: CPT | Performed by: PHYSICAL THERAPIST

## 2021-10-01 PROCEDURE — 97162 PT EVAL MOD COMPLEX 30 MIN: CPT | Performed by: PHYSICAL THERAPIST

## 2021-10-01 NOTE — PROGRESS NOTES
Physical Therapy Initial Evaluation and Plan of Care      Patient: Shaheen Rivera   : 1990  Diagnosis/ICD-10 Code:  Right knee pain, unspecified chronicity [M25.561]  Referring practitioner: Marta Lal MD  Date of Initial Visit: 10/1/2021  Today's Date: 10/1/2021  Patient seen for 1 sessions           Subjective Evaluation    History of Present Illness  Date of onset: 2020  Mechanism of injury: Pt was getting out of a semi truck at work when he slipped. Tried to catch himself and landed on the one leg. He was really busy at work that day, so just let it go. Had pain at the time. He had a DVT and A-fib on . He was in ICU for a week/10 days then underwent a cardioversion on . Since the hospital stay and resting after his procedure, he has had more buckling in the knee and pain. History R hamstring tear in HS playing football. He has had an injection in the knee (prior to his initial PT visit in ). Due to the heart issue and being on Eliquis, they have postponed the surgery for now. He does feel the knee shift at times. He has been using a [place] for cardio and is lifting weights. He does have buckling at times (has buckled on the bike as well, has not fallen), does not do stairs much, is very careful climbing in/out of trucks at work. Cardiologist limited him to lifting 100lb.       Patient Occupation: semi truck salesman  Quality of life: good    Pain  Current pain ratin  At worst pain ratin  Location: R knee  Quality: dull ache  Relieving factors: rest  Aggravating factors: squatting (deep squats, avoids stairs (also steep step getting in./out of truck))    Diagnostic Tests  MRI studies: abnormal    Treatments  Previous treatment: physical therapy and injection treatment  Patient Goals  Patient goals for therapy: increased strength and return to sport/leisure activities             Objective          Static Posture     Ankle/Foot   Ankle/Foot (Left): Pes planus.    Ankle/Foot (Right): Pes planus.     Tenderness     Right Knee   Tenderness in the lateral joint line and medial joint line.     Active Range of Motion   Left Knee   Flexion: 122 degrees   Extension: 5 (of HE) degrees     Right Knee   Flexion: 120 degrees   Extension: 5 (of HE) degrees     Strength/Myotome Testing     Left Hip   Planes of Motion   Flexion: 4+  Abduction: 4+  External rotation: 5    Right Hip   Planes of Motion   Flexion: 4+  Abduction: 4  External rotation: 4    Left Knee   Flexion: 5  Extension: 5    Right Knee   Flexion: 4  Extension: 4    Tests     Right Knee   Positive anterior drawer and anterior Lachman.     Functional Assessment     Single Leg Squat   Left Leg  Within functional limits.     Right Leg  Pain (only able to squat 1-2 inches, fear of buckling).         Exercises:  -hip abd/ER with black band  -Matrix, single leg hamstring curl, 30lb x20  -Matrix hip abd, 70 lb x20  -Matrix single leg press, 20lb, x15    Education on injury. Discussed his need for custom orthotics with his flat feet and hyperextension of B knees, putting him in a more vulnerable position for knee injury      Functional Outcome Score:   Knee Outcome Survey=77.5%        Assessment & Plan     Assessment  Impairments: abnormal or restricted ROM, activity intolerance, impaired physical strength, lacks appropriate home exercise program and pain with function  Assessment details: Shaheen Rivera is a 31 y.o. year-old male referred to physical therapy for right knee pain. He presents with a evolving clinical presentation.  He has comorbidities of a R ACL tear, B knee HE, and B pes planus and no known personal factors that may affect his progress in the plan of care.  Pt has improved his ROM since June to equal the L knee, decreased R hip and knee strength 4/5, and decreased flexibility of the HS and gastroc/soleus complex. Pt would benefit from therapy to help improve his ability to squat, perform stairs, and work out  with less pain and fear or buckling of the knee.   Prognosis: good  Functional Limitations: unable to perform repetitive tasks  Goals  Plan Goals: ST weeks  1. Patient will be independent with education for symptom management, joint protection and strategies to minimize stress on affected tissues  2. Patient will increase knee and hip strength to 4+/5 to improve functional mobility  3. Pt to be fit for custom orthotics    LT weeks  1. Pt to improve score on Knee Outcome Survey from 77.5% to 90% for overall functional improvement  2. Patient will increase knee and hip strength to 5/5 to improve functional mobility  3. Patient will negotiate stairs reciprocally without rail support and no pain  4. Patient will demonstrate an independent HEP for core and knee strength and flexibility/ROM.  5. Pt will be independent with wear pattern and care of custom orthotics      Plan  Therapy options: will be seen for skilled physical therapy services  Planned modality interventions: cryotherapy and ultrasound  Planned therapy interventions: ADL retraining, balance/weight-bearing training, flexibility, functional ROM exercises, gait training, home exercise program, IADL retraining, joint mobilization, manual therapy, motor coordination training, neuromuscular re-education, soft tissue mobilization, strengthening, stretching, therapeutic activities, transfer training, orthotic fitting/training, abdominal trunk stabilization and body mechanics training  Frequency: 2x week  Duration in weeks: 12  Treatment plan discussed with: patient        Timed:  Manual Therapy:         mins  96158;  Therapeutic Exercise:    15     mins  72960;     Neuromuscular Soumya:        mins  45343;    Therapeutic Activity:          mins  51433;     Gait Training:           mins  60871;     Ultrasound:          mins  42456;    Electrical Stimulation:         mins  14661 ( );  Iontophoresis         mins 97074  Dry Needling         mins      Untimed:  Electrical Stimulation:         mins  60340 ( );  Mechanical Traction:         mins  11435;     Timed Treatment:   15   mins   Total Treatment:     40   mins    PT SIGNATURE: Khushi Garcia, PT   DATE TREATMENT INITIATED: 10/1/2021    Initial Certification  Certification Period: 12/30/2021  I certify that the therapy services are furnished while this patient is under my care.  The services outlined above are required by this patient, and will be reviewed every 90 days.     PHYSICIAN: Marta Lal MD      DATE:     Please sign and return via fax to [phone] Thank you, [facility].

## 2021-10-11 ENCOUNTER — CLINICAL SUPPORT (OUTPATIENT)
Dept: ORTHOPEDIC SURGERY | Facility: CLINIC | Age: 31
End: 2021-10-11

## 2021-10-11 VITALS — BODY MASS INDEX: 34.44 KG/M2 | HEIGHT: 71 IN | WEIGHT: 246 LBS | TEMPERATURE: 97.5 F

## 2021-10-11 DIAGNOSIS — M25.861 PATELLOFEMORAL DYSFUNCTION OF RIGHT KNEE: Primary | ICD-10-CM

## 2021-10-11 DIAGNOSIS — S83.511D RUPTURE OF ANTERIOR CRUCIATE LIGAMENT OF RIGHT KNEE, SUBSEQUENT ENCOUNTER: ICD-10-CM

## 2021-10-11 PROCEDURE — 20610 DRAIN/INJ JOINT/BURSA W/O US: CPT | Performed by: NURSE PRACTITIONER

## 2021-10-11 PROCEDURE — 99212 OFFICE O/P EST SF 10 MIN: CPT | Performed by: NURSE PRACTITIONER

## 2021-10-11 RX ORDER — METHYLPREDNISOLONE ACETATE 80 MG/ML
80 INJECTION, SUSPENSION INTRA-ARTICULAR; INTRALESIONAL; INTRAMUSCULAR; SOFT TISSUE
Status: COMPLETED | OUTPATIENT
Start: 2021-10-11 | End: 2021-10-11

## 2021-10-11 RX ORDER — LIDOCAINE HYDROCHLORIDE 10 MG/ML
4 INJECTION, SOLUTION EPIDURAL; INFILTRATION; INTRACAUDAL; PERINEURAL
Status: COMPLETED | OUTPATIENT
Start: 2021-10-11 | End: 2021-10-11

## 2021-10-11 RX ADMIN — LIDOCAINE HYDROCHLORIDE 4 ML: 10 INJECTION, SOLUTION EPIDURAL; INFILTRATION; INTRACAUDAL; PERINEURAL at 14:22

## 2021-10-11 RX ADMIN — METHYLPREDNISOLONE ACETATE 80 MG: 80 INJECTION, SUSPENSION INTRA-ARTICULAR; INTRALESIONAL; INTRAMUSCULAR; SOFT TISSUE at 14:22

## 2021-10-11 NOTE — PROGRESS NOTES
[facility]  Follow Up Visit      Patient Name: Shaheen Rivera  : 1990  Primary Care Physician: Lico Golden MD        Chief Complaint: Right knee pain    HPI:   Shaheen Rivera is a 31 y.o. year old who presents today for right knee pain.  Patient has a history of injury to the right knee and chronic ACL rupture.  He also has some patellofemoral dysfunction.  He was scheduled to proceed with ACL reconstruction however due to his cardiac history and ejection fraction he was hesitant to proceed with surgery although he was cleared by his cardiologist.  He still is considered high risk.  He has been working with therapy and working on exercises.  He reports that he has some anterior knee pain, worse with certain activities such as leg press etc.  Better with rest.       ROS:  ROS negative except as listed in the HPI.    Allergies: No Known Allergies    Medications:  Current Outpatient Medications on File Prior to Visit   Medication Sig   • acyclovir (ZOVIRAX) 200 MG capsule Take 1 capsule by mouth 2 (Two) Times a Day.   • anastrozole (ARIMIDEX) 1 MG tablet Take 1 mg by mouth Daily.   • apixaban (ELIQUIS) 5 MG tablet tablet Take 5 mg by mouth 2 (two) times a day. HOLD PER MD INSTR   • carvedilol (COREG) 6.25 MG tablet Take 12.5 mg by mouth 2 (Two) Times a Day With Meals.   • furosemide (LASIX) 40 MG tablet 40 mg 2 (Two) Times a Day.   • losartan (COZAAR) 25 MG tablet Take 25 mg by mouth 2 (Two) Times a Day.   • spironolactone (ALDACTONE) 25 MG tablet Take 25 mg by mouth Daily.   • Testosterone Cypionate (DEPOTESTOTERONE CYPIONATE) 200 MG/ML injection Inject 1 mL into the shoulder, thigh, or buttocks Every 7 (Seven) Days.     No current facility-administered medications on file prior to visit.       Physical Exam:   31 y.o. male  Body mass index is 34.31 kg/m²., 112 kg (246 lb)  Vitals:    10/11/21 1421   Temp: 97.5 °F (36.4 °C)     General: Alert, cooperative, appears well and in no observable distress.  Appears stated age and BMI as listed above.  HEENT: Normocephalic, atraumatic on external visual inspection.  CV: No significant peripheral edema.  Respiratory: Normal respiratory effort.  Skin: Warm & well perfused; appropriate skin turgor.  Psych: Appropriate mood & affect.  Neuro: Gross sensation and motor intact in affected extremity/extremities.  Vascular: Peripheral pulses palpable in affected extremity/extremities.     MSK Exam:  RIGHT Knee  No wounds, no gross deformity. trace effusion. Tenderness to palpation patellofemoral compartment ROM 0 to 130 and symmetric with contralateral extremity    Brief hip exam in the affected extremity(ies) grossly unremarkable. No groin pain elicited. Bilateral ankles with painless ROM, grossly symmetric. Calves soft and compressible, compartments non-tender in B/L lower extremities.       Radiology:    No new images were needed at the visit today.     Procedure:   See Procedure Note: The potential risks and benefits of performing a diagnostic and therapeutic injection were discussed with the patient prior to procedure. Risks include, but are not limited to infection, swelling, transient increase in pain, bleeding, bruising. Patient was advised that injections are a diagnostic and therapeutic tool meaning they may not alleviate symptoms at all, or may only provide partial or temporary relief.       Misc. Data/Labs: N/A    Assessment & Plan:    This is a 31-year-old male with chronic right knee pain, history of injury and ACL rupture.  I also suspect some patellofemoral dysfunction contributing to his symptoms today.  We talked about different options, patient would like to proceed with a cortisone injection.  He has plans to follow-up with his cardiologist again this month.  He does have questions about proceeding with surgery and I encouraged him to have his follow-up with cardiology and then schedule a follow-up with Dr. Lal just to go over all of the details of the  surgery again to make sure this is the right decision for him going forward.  Being as he is so young I do think he would do better with a repair but of course he is high risk and all the factors need to be taken into consideration-would be best addressed by his surgeon.    He tolerated the injection well.  We discussed injection precautions.  I did tell him the injection will really not help with instability but hopefully this will help with some of the pain that he is having so we can continue his exercises comfortably.  He knows he would need to wait at least a month after this injection before he could proceed with any surgery.    Patient encouraged to call with questions or concerns prior to follow up.       ICD-10-CM ICD-9-CM   1. Patellofemoral dysfunction of right knee  M25.861 719.86   2. Rupture of anterior cruciate ligament of right knee, subsequent encounter  S83.511D V58.89     844.2     Imaging Results (Most Recent)     None        No orders of the defined types were placed in this encounter.    Orders Placed This Encounter   Procedures   • Large Joint Arthrocentesis: R knee     Return in about 4 weeks (around 11/8/2021) for With Dr. Lal.    ALINA Leos    Large Joint Arthrocentesis: R knee  Date/Time: 10/11/2021 2:22 PM  Consent given by: patient  Site marked: site marked  Timeout: Immediately prior to procedure a time out was called to verify the correct patient, procedure, equipment, support staff and site/side marked as required   Supporting Documentation  Indications: pain   Procedure Details  Location: knee - R knee  Preparation: Patient was prepped and draped in the usual sterile fashion  Needle size: 22 G  Approach: anteromedial  Medications administered: 80 mg methylPREDNISolone acetate 80 MG/ML; 4 mL lidocaine PF 1% 1 %  Patient tolerance: patient tolerated the procedure well with no immediate complications          Dictated Utilizing Dragon Dictation

## 2021-10-15 ENCOUNTER — TREATMENT (OUTPATIENT)
Dept: PHYSICAL THERAPY | Facility: CLINIC | Age: 31
End: 2021-10-15

## 2021-10-15 DIAGNOSIS — S83.511D RUPTURE OF ANTERIOR CRUCIATE LIGAMENT OF RIGHT KNEE, SUBSEQUENT ENCOUNTER: ICD-10-CM

## 2021-10-15 DIAGNOSIS — M25.561 RIGHT KNEE PAIN, UNSPECIFIED CHRONICITY: Primary | ICD-10-CM

## 2021-10-15 DIAGNOSIS — M21.42 PES PLANUS OF BOTH FEET: ICD-10-CM

## 2021-10-15 DIAGNOSIS — M21.41 PES PLANUS OF BOTH FEET: ICD-10-CM

## 2021-10-15 PROCEDURE — 97110 THERAPEUTIC EXERCISES: CPT | Performed by: PHYSICAL THERAPIST

## 2021-10-15 PROCEDURE — 97530 THERAPEUTIC ACTIVITIES: CPT | Performed by: PHYSICAL THERAPIST

## 2021-10-15 NOTE — PROGRESS NOTES
Physical Therapy Daily Progress Note    Patient: Shaheen Rivera   : 1990  Diagnosis/ICD-10 Code:  Right knee pain, unspecified chronicity [M25.561]  Referring practitioner: Marta Lal MD  Date of Initial Visit: Type: THERAPY  Noted: 10/1/2021  Today's Date: 10/15/2021  Patient seen for 2 sessions           Subjective I had to get a cortisone injection in the knee    Objective       Exercises:  -bike 6 min  -bettie single leg press, 85lb, x30  -4 inch step ups, runner's step, x20  -4 inch lateral step downs, x15  -cable resisted walking, 27.5lb, x4  -rockerboard, M/L  -mini squat on rockerboard, 10x 10 sec  -Bettie hip abd, 90lb 2x15  -Matrix single leg hamstring curl, 30lb, x20  -3 way hamstring stretch, 3x20 sec  -calf stretch, 3x20 sec     Assessment:  Pt was going to the gym and doing some of the machines that we tried during our last session, but he started having increased pain. He had a cortisone injection this week and it is feeling better. Backed off of the leg press (used a different machine as well) and he tolerated that better. Will continue to work on LE strength and stability    Timed:    Manual Therapy:         mins  26020;  Therapeutic Exercise:    30     mins  80946;     Neuromuscular Soumya:        mins  62201;    Therapeutic Activity:     12     mins  36235;     Gait Training:           mins  48394;     Ultrasound:          mins  69479;    Electrical Stimulation:         mins  56654 ( );  Iontophoresis         mins 00609;  Aquatic Therapy         mins 57116;  Dry Needling                   mins    Untimed:  Electrical Stimulation:         mins  63078 ( );  Mechanical Traction:         mins  41132;     Timed Treatment:   42   mins   Total Treatment:     42   mins  Khushi Garcia, PT  Physical Therapist

## 2021-10-22 ENCOUNTER — TREATMENT (OUTPATIENT)
Dept: PHYSICAL THERAPY | Facility: CLINIC | Age: 31
End: 2021-10-22

## 2021-10-22 DIAGNOSIS — S83.511D RUPTURE OF ANTERIOR CRUCIATE LIGAMENT OF RIGHT KNEE, SUBSEQUENT ENCOUNTER: ICD-10-CM

## 2021-10-22 DIAGNOSIS — M21.41 PES PLANUS OF BOTH FEET: ICD-10-CM

## 2021-10-22 DIAGNOSIS — M21.42 PES PLANUS OF BOTH FEET: ICD-10-CM

## 2021-10-22 DIAGNOSIS — M25.561 RIGHT KNEE PAIN, UNSPECIFIED CHRONICITY: Primary | ICD-10-CM

## 2021-10-22 PROCEDURE — 97530 THERAPEUTIC ACTIVITIES: CPT | Performed by: PHYSICAL THERAPIST

## 2021-10-22 PROCEDURE — 97110 THERAPEUTIC EXERCISES: CPT | Performed by: PHYSICAL THERAPIST

## 2021-10-22 NOTE — PROGRESS NOTES
Physical Therapy Daily Progress Note    Patient: Shaheen Rivera   : 1990  Diagnosis/ICD-10 Code:  Right knee pain, unspecified chronicity [M25.561]  Referring practitioner: Marta Lal MD  Date of Initial Visit: Type: THERAPY  Noted: 10/1/2021  Today's Date: 10/22/2021  Patient seen for 3 sessions           Subjective doing a little better since the injection. Sees cardiologist next week about when he can schedule surgery.     Objective      Exercises:  -bike 6 min  -bettie single leg press, 85lb, x30  -4 inch step ups, runner's step, x20  -4 inch lateral step downs, x15  -4 inch step down to heel touch, x15  -cable resisted walking, 27.5lb, x4  -rockerboard, M/L  -mini squat on rockerboard, 10x 10 sec  -Bettie hip abd, 90lb 2x15  -3 way hamstring stretch, 3x20 sec  -calf stretch, 3x20 sec    Assessment/Plan  Pt is still having R knee pain and issues with instability. He is working on his strength, has good ROM. Difficulties continue with stairs, inclines, any pivoting or twisting. He is going back to his cardiologist next week to discuss when he may be cleared for surgery. He is going to be in and out of town for work over the next few weeks, but will contact me when he knows what the plan is. We discussed post op precautions, when to start therapy         Timed:    Manual Therapy:         mins  63122;  Therapeutic Exercise:    25   mins  05357;     Neuromuscular Soumya:        mins  75748;    Therapeutic Activity:     15     mins  19301;     Gait Training:           mins  08806;     Ultrasound:          mins  06723;    Electrical Stimulation:         mins  03677 ( );  Iontophoresis         mins 35360;  Aquatic Therapy         mins 15758;  Dry Needling                   mins    Untimed:  Electrical Stimulation:         mins  09891 ( );  Mechanical Traction:         mins  26772;     Timed Treatment:   40   mins   Total Treatment:     40   mins  Khushi Garcia, PT  Physical Therapist

## 2021-11-17 ENCOUNTER — TELEPHONE (OUTPATIENT)
Dept: ORTHOPEDIC SURGERY | Facility: CLINIC | Age: 31
End: 2021-11-17

## 2021-11-17 NOTE — TELEPHONE ENCOUNTER
Caller: Shaheen Rivera    Relationship to patient: Self    Best call back number: [phone]  Chief complaint: RIGHT KNEE PAIN     Type of visit: FOLLOW UP    Requested date: ASAP    If rescheduling, when is the original appointment:     Additional notes: PATIENT ADVISED HE WOULD LIKE TO TO SCHEDULE AN APPOINTMENT TO GET CORTISONE INJECTION IN HIS RIGHT KNEE ASAP.

## 2021-11-17 NOTE — TELEPHONE ENCOUNTER
Assessment/Plan:  Assessment/Plan   Diagnoses and all orders for this visit:    Radiculopathy, lumbar region    Chronic right SI joint pain        Reviewed physical exam findings and MRI findings with patient at time of visit  She can continue activities as tolerated without limitations or restrictions  She has been provided documentation for return to work beginning 6/21/2021  She will be seen moving forward on as-needed basis for re-evaluation and consideration for repeat injection as necessary  Patient is in agreement with this treatment plan  the patient is doing quite well from her right sacroiliac joint injection  Physical exam shows a painless arc of motion throughout her hip  She is neurologically intact distally  With full strength and full motion  Her MRI results were discussed  The importance of a home exercise program was reviewed  Return back on as-needed basis  She was released to go back to work starting June 20, 2021    Subjective:   Patient ID: Hector Sumner is a 60 y o  female  HPI   patient presents for follow-up evaluation and MRI review of the lumbar spine  She was last seen in regards to this issue on 5/25/2021, at which time she received a cortisone injection to the right SI joint  She states that her pain has completely resolved since her previous injection  On today's presentation she denies any pain with activity  She also denies any recent bruising, swelling, numbness, tingling, feelings of instability, or mechanical symptoms      The following portions of the patient's history were reviewed and updated as appropriate: allergies, current medications, past family history, past medical history, past social history, past surgical history and problem list     Past Medical History:   Diagnosis Date    A-fib [facility])     Brain aneurysm     Cancer ([facility] )     papillary thyroid cancer    Cardiac disease     CHF (congestive heart failure) ([facility] )     Crohn disease ([facility] )     Patient is ready for surgery instead of doing the injections . But he would like for doctor to touch base with him or clinical staff .     Patients phone number is correct in chart    Diabetes mellitus ([facility] )     Disease of thyroid gland     GERD (gastroesophageal reflux disease)     Hyperlipidemia     Hypertension      Past Surgical History:   Procedure Laterality Date    APPENDECTOMY      CARDIOVERSION N/A 2/14/2019    Procedure: CARDIOVERSION;  Surgeon: Tere Sevilla MD;  Location: [facility];  Service: Cardiology    CEREBRAL ANEURYSM REPAIR      CHOLECYSTECTOMY      HYSTERECTOMY      THYROIDECTOMY      TONSILLECTOMY AND ADENOIDECTOMY       History reviewed  No pertinent family history  Social History     Socioeconomic History    Marital status: /Civil Union     Spouse name: None    Number of children: None    Years of education: None    Highest education level: None   Occupational History    None   Tobacco Use    Smoking status: Former Smoker     Quit date: 1/1/2018     Years since quitting: 3 4    Smokeless tobacco: Never Used   Vaping Use    Vaping Use: Never used   Substance and Sexual Activity    Alcohol use: Yes     Alcohol/week: 0 0 standard drinks     Comment: social    Drug use: Never    Sexual activity: None   Other Topics Concern    None   Social History Narrative    None     Social Determinants of Health     Financial Resource Strain:     Difficulty of Paying Living Expenses:    Food Insecurity:     Worried About Running Out of Food in the Last Year:     [address] in the Last Year:    Transportation Needs:     Lack of Transportation (Medical):      Lack of Transportation (Non-Medical):    Physical Activity:     Days of Exercise per Week:     Minutes of Exercise per Session:    Stress:     Feeling of Stress :    Social Connections:     Frequency of Communication with Friends and Family:     Frequency of Social Gatherings with Friends and Family:     Attends [employer] Services:     Active Member of Clubs or Organizations:     Attends Club or Organization Meetings:     Marital Status:    Intimate Partner Violence:     Fear of Current or Ex-Partner:     Emotionally Abused:     Physically Abused:     Sexually Abused:        Current Outpatient Medications:     albuterol (PROVENTIL HFA,VENTOLIN HFA) 90 mcg/act inhaler, Inhale 2 puffs every 6 (six) hours as needed for wheezing or shortness of breath, Disp: , Rfl: 0    apixaban (ELIQUIS) 5 mg, Take 5 mg by mouth 2 (two) times a day , Disp: , Rfl:     aspirin 81 mg chewable tablet, Chew 1 tablet (81 mg total) daily, Disp: , Rfl: 0    atorvastatin (LIPITOR) 10 mg tablet, Take 10 mg by mouth daily, Disp: , Rfl:     Blood Glucose Monitoring Suppl (ONETOUCH VERIO) w/Device KIT, Use to check sugars once a day  Dx E11 9, Disp: , Rfl:     Blood Glucose Monitoring Suppl (Starling HELIX BIOMEDIXop) w/Device KIT, daily Use to check glucose, Disp: , Rfl:     dofetilide (TIKOSYN) 500 mcg capsule, Take 500 mcg by mouth 2 (two) times a day, Disp: , Rfl:     fenofibrate (TRICOR) 54 MG tablet, Take 54 mg by mouth daily, Disp: , Rfl:     furosemide (LASIX) 20 mg tablet, Take 20 mg by mouth 2 (two) times a day, Disp: , Rfl:     glipiZIDE (GLUCOTROL XL) 5 mg 24 hr tablet, TAKE 1 TABLET BY MOUTH ONCE DAILY 30 MINUTES BEFORE A MEAL (REPLACES METFORMIN), Disp: , Rfl:     glucose blood test strip, Use to check sugars once a day   Dx E11 9, Disp: , Rfl:     Lancets (ONETOUCH DELICA PLUS [id number]) [facility], USE 1 TO CHECK GLUCOSE ONCE DAILY, Disp: , Rfl:     mesalamine (DELZICOL) 400 mg, Take 2 capsules by mouth twice daily, Disp: 120 capsule, Rfl: 0    methocarbamol (ROBAXIN) 500 mg tablet, Take 1 tablet (500 mg total) by mouth 2 (two) times a day, Disp: 20 tablet, Rfl: 0    metoprolol succinate (TOPROL-XL) 100 mg 24 hr tablet, Take 1 tablet (100 mg total) by mouth every 12 (twelve) hours, Disp: 60 tablet, Rfl: 0    mometasone-formoterol (DULERA) 100-5 MCG/ACT inhaler, Inhale 2 puffs 2 (two) times a day, Disp: , Rfl: 0    omeprazole (PriLOSEC) 40 MG capsule, TAKE 1 CAPSULE BY MOUTH ONCE DAILY BEFORE BREAKFAST, Disp: 30 capsule, Rfl: 0    OneTouch Delica Lancets 76R MISC, Use to check sugars once a day  Dx E11 9, Disp: , Rfl:     ONETOUCH VERIO test strip, USE 1 STRIP TO CHECK GLUCOSE ONCE DAILY, Disp: , Rfl:     pramipexole (MIRAPEX) 0 5 mg tablet, Take 1 tablet (0 5 mg total) by mouth daily at bedtime, Disp: 90 tablet, Rfl: 1    SYNTHROID 150 MCG tablet, TAKE 1 TABLET BY MOUTH ONCE DAILY FIRST THING IN THE MORNING (AT LEAST 30 MINUTES PRIOR TO BREAKFAST OR OTHER MEDS), Disp: , Rfl:     traMADol (ULTRAM) 50 mg tablet, Take 50 mg by mouth, Disp: , Rfl:     traMADol (ULTRAM) 50 mg tablet, Take 50 mg by mouth every 8 (eight) hours as needed, Disp: , Rfl:     venlafaxine (EFFEXOR XR) 37 5 mg 24 hr capsule, Take 37 5 mg by mouth daily , Disp: , Rfl:     dicyclomine (BENTYL) 20 mg tablet, Take 1 tablet (20 mg total) by mouth every 6 (six) hours as needed (urgency, abdominal pain) (Patient not taking: Reported on 5/17/2021), Disp: 120 tablet, Rfl: 2    Allergies   Allergen Reactions    Sulfa Antibiotics Rash    Other        Review of Systems   Constitutional: Negative for chills, fever and unexpected weight change  HENT: Negative for hearing loss, nosebleeds and sore throat  Eyes: Negative for pain, redness and visual disturbance  Respiratory: Negative for cough, shortness of breath and wheezing  Cardiovascular: Negative for chest pain, palpitations and leg swelling  Gastrointestinal: Negative for abdominal pain, nausea and vomiting  Endocrine: Negative for polydipsia and polyuria  Genitourinary: Negative for dysuria and hematuria  Musculoskeletal:        As noted in HPI   Skin: Negative for rash and wound  Neurological: Negative for dizziness, numbness and headaches  Psychiatric/Behavioral: Negative for decreased concentration and suicidal ideas  The patient is not nervous/anxious          Objective:  /73   Pulse 73   Ht 5' 9" (1 753 m)   Wt 114 kg (251 lb)   BMI 37 07 kg/m²     Ortho Exam    Lumbar spine/Right SI -   no obvious anatomical deformity   patient ambulates with normal gait pattern   nontender over the lumbar midline or right SI   demonstrates good trunk rotation, forward flexion, and extension   knee flexor and extensor mechanisms are intact -  5/5 MMT throughout   5/5 EHL and FHL strength   negative Darek's sign   2+ TP and DP pulses with brisk capillary refill to the toes   Sural, saphenous, tibial, superficial and deep peroneal motor and sensory distributions intact  Sensation light touch intact distally    Physical Exam  Constitutional:       Appearance: She is well-developed  HENT:      Right Ear: External ear normal       Left Ear: External ear normal       Nose: Nose normal    Eyes:      Conjunctiva/sclera: Conjunctivae normal       Pupils: Pupils are equal, round, and reactive to light  Pulmonary:      Effort: Pulmonary effort is normal    Musculoskeletal:         General: Normal range of motion  Cervical back: Normal range of motion  Skin:     General: Skin is warm and dry  Neurological:      Mental Status: She is alert and oriented to person, place, and time  Psychiatric:         Behavior: Behavior normal          Thought Content:  Thought content normal          Judgment: Judgment normal        Imaging:   Attending Physician has personally reviewed pertinent imaging in PACS, impression is as follows:    Review of MRI series taken 6/3/2021 of the  Lumbar spine is significant for  Multilevel DJD with mild protrusion at the L3-L4 level, decreased disc height and stenosis at the L4-L5 level, and disc bulge at the L5-S1 level      Scribe Attestation    I,:  Irma Cochran am acting as a scribe while in the presence of the attending physician :       I,:  Etta Shen DO personally performed the services described in this documentation    as scribed in my presence :

## 2021-11-18 NOTE — TELEPHONE ENCOUNTER
I will put a case request in can you just touch base and see if he has any other questions and that I should see him back preop

## 2021-11-29 NOTE — TELEPHONE ENCOUNTER
Caller: Shaheen Rivera    Relationship: Self    Best call back number: [phone]    What was the call regarding: PATIENT WOULD LIKE TO SCHEDULE R KNEE CORTIZONE INJECTION- PLEASE CALL BACK     Do you require a callback: YES

## 2021-11-30 ENCOUNTER — TELEPHONE (OUTPATIENT)
Dept: ORTHOPEDIC SURGERY | Facility: CLINIC | Age: 31
End: 2021-11-30

## 2021-11-30 NOTE — TELEPHONE ENCOUNTER
----- Message from Thania Perez MA sent at 11/30/2021  3:22 PM EST -----  Regarding: surgery  Please review    ----- Message -----  From: Shaheen Rivera  Sent: 11/30/2021   2:55 PM EST  To: Flaquita Hammond [facility]  Subject: surgery                                          Will you all be setting up the post op rehab or do I need to contact someone

## 2021-11-30 NOTE — TELEPHONE ENCOUNTER
PATIENT HAS A INJECTION ON 10/11/21 AND IS HAVING SURGERY IN January.  PATIENT WAS ADVISED TO ICE ELEVATE, USE TYLENOL.

## 2021-12-06 ENCOUNTER — DOCUMENTATION (OUTPATIENT)
Dept: PHYSICAL THERAPY | Facility: CLINIC | Age: 31
End: 2021-12-06

## 2021-12-06 DIAGNOSIS — S83.511D RUPTURE OF ANTERIOR CRUCIATE LIGAMENT OF RIGHT KNEE, SUBSEQUENT ENCOUNTER: ICD-10-CM

## 2021-12-06 DIAGNOSIS — M21.41 PES PLANUS OF BOTH FEET: ICD-10-CM

## 2021-12-06 DIAGNOSIS — M21.42 PES PLANUS OF BOTH FEET: ICD-10-CM

## 2021-12-06 DIAGNOSIS — M25.561 RIGHT KNEE PAIN, UNSPECIFIED CHRONICITY: Primary | ICD-10-CM

## 2021-12-06 NOTE — PROGRESS NOTES
Closure of Physical Therapy Encounter    Pt is going to have ACL reconstruction on 1/11/2022. He is going to continue on with his HEP until this time. Will contact patient to get him scheduled post op.         Khushi Garcia, PT  Physical Therapist

## 2021-12-23 ENCOUNTER — DOCUMENTATION (OUTPATIENT)
Dept: INTERNAL MEDICINE | Age: 31
End: 2021-12-23

## 2021-12-23 DIAGNOSIS — U07.1 COVID-19 VIRUS INFECTION: Primary | ICD-10-CM

## 2021-12-23 RX ORDER — SODIUM CHLORIDE 9 MG/ML
30 INJECTION, SOLUTION INTRAVENOUS ONCE
Status: CANCELLED | OUTPATIENT
Start: 2021-12-23

## 2021-12-23 RX ORDER — METHYLPREDNISOLONE SODIUM SUCCINATE 125 MG/2ML
125 INJECTION, POWDER, LYOPHILIZED, FOR SOLUTION INTRAMUSCULAR; INTRAVENOUS AS NEEDED
Status: CANCELLED | OUTPATIENT
Start: 2021-12-23

## 2021-12-23 RX ORDER — EPINEPHRINE 1 MG/ML
0.3 INJECTION, SOLUTION, CONCENTRATE INTRAVENOUS AS NEEDED
Status: CANCELLED | OUTPATIENT
Start: 2021-12-23

## 2021-12-23 RX ORDER — DIPHENHYDRAMINE HCL 25 MG
50 TABLET ORAL ONCE AS NEEDED
Status: CANCELLED | OUTPATIENT
Start: 2021-12-23

## 2021-12-23 RX ORDER — DIPHENHYDRAMINE HYDROCHLORIDE 50 MG/ML
50 INJECTION INTRAMUSCULAR; INTRAVENOUS ONCE AS NEEDED
Status: CANCELLED | OUTPATIENT
Start: 2021-12-23

## 2021-12-23 NOTE — PROGRESS NOTES
Patient was diagnosed with COVID-19 (rapid test) yesterday. 3 days of cough, fever, chest congestion. Fully vaccinated and boosted. Has obesity and history of atrial fibrillation along with GOKUL and ADHD. Higher risk for complications. He is interested on monoclonal antibody infusion to reduce risk of hospitalization.     Will arrange for monoclonal antibody infusion at [facility].   Discussed benefits/risks/alternatives to monoclonal antibody infusion, EUA status/not FDA indicated.   He wished to proceed.     He will attach the positive test result through [employer].

## 2021-12-24 RX ORDER — DIPHENHYDRAMINE HCL 50 MG
50 CAPSULE ORAL ONCE AS NEEDED
Status: CANCELLED | OUTPATIENT
Start: 2021-12-28

## 2021-12-24 RX ORDER — DIPHENHYDRAMINE HYDROCHLORIDE 50 MG/ML
50 INJECTION INTRAMUSCULAR; INTRAVENOUS ONCE AS NEEDED
Status: CANCELLED | OUTPATIENT
Start: 2021-12-28

## 2021-12-24 RX ORDER — SODIUM CHLORIDE 9 MG/ML
30 INJECTION, SOLUTION INTRAVENOUS ONCE
Status: CANCELLED | OUTPATIENT
Start: 2021-12-28

## 2021-12-24 RX ORDER — EPINEPHRINE 1 MG/ML
0.3 INJECTION, SOLUTION, CONCENTRATE INTRAVENOUS AS NEEDED
Status: CANCELLED | OUTPATIENT
Start: 2021-12-28

## 2021-12-24 RX ORDER — METHYLPREDNISOLONE SODIUM SUCCINATE 125 MG/2ML
125 INJECTION, POWDER, LYOPHILIZED, FOR SOLUTION INTRAMUSCULAR; INTRAVENOUS AS NEEDED
Status: CANCELLED | OUTPATIENT
Start: 2021-12-28

## 2021-12-28 ENCOUNTER — HOSPITAL ENCOUNTER (OUTPATIENT)
Dept: INFUSION THERAPY | Facility: HOSPITAL | Age: 31
Discharge: HOME | End: 2021-12-28
Admitting: INTERNAL MEDICINE

## 2021-12-28 VITALS
HEART RATE: 76 BPM | RESPIRATION RATE: 18 BRPM | OXYGEN SATURATION: 96 % | SYSTOLIC BLOOD PRESSURE: 96 MMHG | TEMPERATURE: 96.9 F | DIASTOLIC BLOOD PRESSURE: 56 MMHG

## 2021-12-28 DIAGNOSIS — U07.1 CLINICAL DIAGNOSIS OF COVID-19: Primary | ICD-10-CM

## 2021-12-28 PROCEDURE — 96365 THER/PROPH/DIAG IV INF INIT: CPT

## 2021-12-28 PROCEDURE — M0247 HC INTRAVENOUS INFUSION SOTROVIMAB: HCPCS | Performed by: INTERNAL MEDICINE

## 2021-12-28 PROCEDURE — 25010000002 SOTROVIMAB 500 MG/8ML SOLUTION 8 ML VIAL: Performed by: INTERNAL MEDICINE

## 2021-12-28 RX ORDER — SODIUM CHLORIDE 9 MG/ML
30 INJECTION, SOLUTION INTRAVENOUS ONCE
Status: COMPLETED | OUTPATIENT
Start: 2021-12-28 | End: 2021-12-28

## 2021-12-28 RX ORDER — DIPHENHYDRAMINE HYDROCHLORIDE 50 MG/ML
50 INJECTION INTRAMUSCULAR; INTRAVENOUS ONCE AS NEEDED
Status: CANCELLED | OUTPATIENT
Start: 2021-12-28

## 2021-12-28 RX ORDER — DIPHENHYDRAMINE HCL 25 MG
50 CAPSULE ORAL ONCE AS NEEDED
Status: CANCELLED | OUTPATIENT
Start: 2021-12-28

## 2021-12-28 RX ORDER — SODIUM CHLORIDE 9 MG/ML
30 INJECTION, SOLUTION INTRAVENOUS ONCE
Status: CANCELLED | OUTPATIENT
Start: 2021-12-28

## 2021-12-28 RX ORDER — METHYLPREDNISOLONE SODIUM SUCCINATE 125 MG/2ML
125 INJECTION, POWDER, LYOPHILIZED, FOR SOLUTION INTRAMUSCULAR; INTRAVENOUS AS NEEDED
Status: DISCONTINUED | OUTPATIENT
Start: 2021-12-28 | End: 2021-12-30 | Stop reason: HOSPADM

## 2021-12-28 RX ORDER — FLUTICASONE PROPIONATE 50 MCG
1-2 SPRAY, SUSPENSION (ML) NASAL
COMMUNITY
Start: 2021-10-25 | End: 2022-01-04

## 2021-12-28 RX ORDER — DIPHENHYDRAMINE HYDROCHLORIDE 50 MG/ML
50 INJECTION INTRAMUSCULAR; INTRAVENOUS ONCE AS NEEDED
Status: DISCONTINUED | OUTPATIENT
Start: 2021-12-28 | End: 2021-12-30 | Stop reason: HOSPADM

## 2021-12-28 RX ORDER — CARVEDILOL 12.5 MG/1
1.5 TABLET ORAL 2 TIMES DAILY
COMMUNITY
Start: 2021-10-27

## 2021-12-28 RX ORDER — DIPHENHYDRAMINE HCL 25 MG
50 CAPSULE ORAL ONCE AS NEEDED
Status: DISCONTINUED | OUTPATIENT
Start: 2021-12-28 | End: 2021-12-30 | Stop reason: HOSPADM

## 2021-12-28 RX ORDER — METHYLPREDNISOLONE SODIUM SUCCINATE 125 MG/2ML
125 INJECTION, POWDER, LYOPHILIZED, FOR SOLUTION INTRAMUSCULAR; INTRAVENOUS AS NEEDED
Status: CANCELLED | OUTPATIENT
Start: 2021-12-28

## 2021-12-28 RX ORDER — BENZONATATE 200 MG/1
1 CAPSULE ORAL 3 TIMES DAILY PRN
COMMUNITY
Start: 2021-12-21 | End: 2022-01-04

## 2021-12-28 RX ADMIN — SODIUM CHLORIDE 30 ML/HR: 9 INJECTION, SOLUTION INTRAVENOUS at 14:52

## 2021-12-28 RX ADMIN — SODIUM CHLORIDE 500 MG: 9 INJECTION, SOLUTION INTRAVENOUS at 14:51

## 2022-01-04 ENCOUNTER — PRE-ADMISSION TESTING (OUTPATIENT)
Dept: PREADMISSION TESTING | Facility: HOSPITAL | Age: 32
End: 2022-01-04

## 2022-01-04 VITALS
TEMPERATURE: 97.7 F | HEART RATE: 58 BPM | BODY MASS INDEX: 35.13 KG/M2 | RESPIRATION RATE: 16 BRPM | WEIGHT: 250.9 LBS | DIASTOLIC BLOOD PRESSURE: 77 MMHG | OXYGEN SATURATION: 96 % | SYSTOLIC BLOOD PRESSURE: 123 MMHG | HEIGHT: 71 IN

## 2022-01-04 LAB
ANION GAP SERPL CALCULATED.3IONS-SCNC: 11.3 MMOL/L (ref 5–15)
BUN SERPL-MCNC: 15 MG/DL (ref 6–20)
BUN/CREAT SERPL: 17.2 (ref 7–25)
CALCIUM SPEC-SCNC: 9.7 MG/DL (ref 8.6–10.5)
CHLORIDE SERPL-SCNC: 103 MMOL/L (ref 98–107)
CO2 SERPL-SCNC: 26.7 MMOL/L (ref 22–29)
CREAT SERPL-MCNC: 0.87 MG/DL (ref 0.76–1.27)
DEPRECATED RDW RBC AUTO: 41.6 FL (ref 37–54)
ERYTHROCYTE [DISTWIDTH] IN BLOOD BY AUTOMATED COUNT: 12.6 % (ref 12.3–15.4)
GFR SERPL CREATININE-BSD FRML MDRD: 103 ML/MIN/1.73
GLUCOSE SERPL-MCNC: 105 MG/DL (ref 65–99)
HCT VFR BLD AUTO: 50.3 % (ref 37.5–51)
HGB BLD-MCNC: 16.7 G/DL (ref 13–17.7)
MCH RBC QN AUTO: 30.1 PG (ref 26.6–33)
MCHC RBC AUTO-ENTMCNC: 33.2 G/DL (ref 31.5–35.7)
MCV RBC AUTO: 90.8 FL (ref 79–97)
PLATELET # BLD AUTO: 251 10*3/MM3 (ref 140–450)
PMV BLD AUTO: 9.8 FL (ref 6–12)
POTASSIUM SERPL-SCNC: 3.9 MMOL/L (ref 3.5–5.2)
RBC # BLD AUTO: 5.54 10*6/MM3 (ref 4.14–5.8)
SODIUM SERPL-SCNC: 141 MMOL/L (ref 136–145)
WBC NRBC COR # BLD: 7.21 10*3/MM3 (ref 3.4–10.8)

## 2022-01-04 PROCEDURE — 80048 BASIC METABOLIC PNL TOTAL CA: CPT

## 2022-01-04 PROCEDURE — 36415 COLL VENOUS BLD VENIPUNCTURE: CPT

## 2022-01-04 PROCEDURE — 85027 COMPLETE CBC AUTOMATED: CPT

## 2022-01-04 RX ORDER — SACUBITRIL AND VALSARTAN 24; 26 MG/1; MG/1
TABLET, FILM COATED ORAL 2 TIMES DAILY
COMMUNITY
Start: 2021-10-28 | End: 2022-09-22 | Stop reason: DRUGHIGH

## 2022-01-04 NOTE — DISCHARGE INSTRUCTIONS
Take the following medications the morning of surgery: CARVEDILOL    ARRIVAL TIME :530AM       General Instructions:  • Do not eat solid food after midnight the night before surgery.  • You may drink clear liquids day of surgery but must stop at least one hour before your hospital arrival time. 430AM  • It is beneficial for you to have a clear drink that contains carbohydrates the day of surgery.  We suggest a 12 to 20 ounce bottle of Gatorade or Powerade for non-diabetic patients or a 12 to 20 ounce bottle of G2 or Powerade Zero for diabetic patients. (Pediatric patients, are not advised to drink a 12 to 20 ounce carbohydrate drink)    Clear liquids are liquids you can see through.  Nothing red in color.     Plain water                               Sports drinks  Sodas                                   Gelatin (Jell-O)  Fruit juices without pulp such as white grape juice and apple juice  Popsicles that contain no fruit or yogurt  Tea or coffee (no cream or milk added)  Gatorade / Powerade  G2 / Powerade Zero      • Patients who avoid smoking, chewing tobacco and alcohol for 4 weeks prior to surgery have a reduced risk of post-operative complications.  Quit smoking as many days before surgery as you can.  • Do not smoke, use chewing tobacco or drink alcohol the day of surgery.   • If applicable bring your C-PAP/ BI-PAP machine.  • Bring any papers given to you in the doctor’s office.  • Wear clean comfortable clothes.  • Do not wear contact lenses, false eyelashes or make-up.  Bring a case for your glasses.   • Remove all piercings.  Leave jewelry and any other valuables at home.  • Hair extensions with metal clips must be removed prior to surgery.  • The Pre-Admission Testing nurse will instruct you to bring medications if unable to obtain an accurate list in Pre-Admission Testing.        Preventing a Surgical Site Infection:  • For 2 to 3 days before surgery, avoid shaving with a razor because the razor can  irritate skin and make it easier to develop an infection.    • Any areas of open skin can increase the risk of a post-operative wound infection by allowing bacteria to enter and travel throughout the body.  Notify your surgeon if you have any skin wounds / rashes even if it is not near the expected surgical site.  The area will need assessed to determine if surgery should be delayed until it is healed.  • The night prior to surgery shower using a fresh bar of anti-bacterial soap (such as Dial) and clean washcloth.  Sleep in a clean bed with clean clothing.  Do not allow pets to sleep with you.  • Shower on the morning of surgery using a fresh bar of anti-bacterial soap (such as Dial) and clean washcloth.  Dry with a clean towel and dress in clean clothing.  • Ask your surgeon if you will be receiving antibiotics prior to surgery.  • Make sure you, your family, and all healthcare providers clean their hands with soap and water or an alcohol based hand  before caring for you or your wound.    Day of surgery:  Your arrival time is approximately two hours before your scheduled surgery time.  Upon arrival, a Pre-op nurse and Anesthesiologist will review your health history, obtain vital signs, and answer questions you may have.  The only belongings needed at this time will be a list of your home medications and if applicable your C-PAP/BI-PAP machine.  A Pre-op nurse will start an IV and you may receive medication in preparation for surgery, including something to help you relax.     Please be aware that surgery does come with discomfort.  We want to make every effort to control your discomfort so please discuss any uncontrolled symptoms with your nurse.   Your doctor will most likely have prescribed pain medications.      If you are going home after surgery you will receive individualized written care instructions before being discharged.  A responsible adult must drive you to and from the hospital on the day  of your surgery and stay with you for 24 hours.  Discharge prescriptions can be filled by the hospital pharmacy during regular pharmacy hours.  If you are having surgery late in the day/evening your prescription may be e-prescribed to your pharmacy.  Please verify your pharmacy hours or chose a 24 hour pharmacy to avoid not having access to your prescription because your pharmacy has closed for the day.    If you are staying overnight following surgery, you will be transported to your hospital room following the recovery period.  [facility] has all private rooms.    If you have any questions please call Pre-Admission Testing at [phone].  Deductibles and co-payments are collected on the day of service. Please be prepared to pay the required co-pay, deductible or deposit on the day of service as defined by your plan.    Patient Education for Self-Quarantine Process    • Following your COVID testing, we strongly recommend that you wear a mask when you are with other people and practice social distancing.   • Limit your activities to only required outings.  • Wash your hands with soap and water frequently for at least 20 seconds.   • Avoid touching your eyes, nose and mouth with unwashed hands.  • Do not share anything - utensils, drinking glasses, food from the same bowl.   • Sanitize household surfaces daily. Include all high touch areas (door handles, light switches, phones, countertops, etc.)    Call your surgeon immediately if you experience any of the following symptoms:  • Sore Throat  • Shortness of Breath or difficulty breathing  • Cough  • Chills  • Body soreness or muscle pain  • Headache  • Fever  • New loss of taste or smell  • Do not arrive for your surgery ill.  Your procedure will need to be rescheduled to another time.  You will need to call your physician before the day of surgery to avoid any unnecessary exposure to hospital staff as well as other patients.

## 2022-01-08 ENCOUNTER — LAB (OUTPATIENT)
Dept: LAB | Facility: HOSPITAL | Age: 32
End: 2022-01-08

## 2022-01-08 LAB — SARS-COV-2 ORF1AB RESP QL NAA+PROBE: NOT DETECTED

## 2022-01-08 PROCEDURE — C9803 HOPD COVID-19 SPEC COLLECT: HCPCS

## 2022-01-08 PROCEDURE — U0004 COV-19 TEST NON-CDC HGH THRU: HCPCS

## 2022-01-11 ENCOUNTER — TRANSCRIBE ORDERS (OUTPATIENT)
Dept: ADMINISTRATIVE | Facility: HOSPITAL | Age: 32
End: 2022-01-11

## 2022-01-11 DIAGNOSIS — Z01.818 OTHER SPECIFIED PRE-OPERATIVE EXAMINATION: Primary | ICD-10-CM

## 2022-01-15 ENCOUNTER — LAB (OUTPATIENT)
Dept: LAB | Facility: HOSPITAL | Age: 32
End: 2022-01-15

## 2022-01-15 DIAGNOSIS — Z01.818 OTHER SPECIFIED PRE-OPERATIVE EXAMINATION: ICD-10-CM

## 2022-01-15 LAB — SARS-COV-2 ORF1AB RESP QL NAA+PROBE: NOT DETECTED

## 2022-01-15 PROCEDURE — C9803 HOPD COVID-19 SPEC COLLECT: HCPCS

## 2022-01-15 PROCEDURE — U0004 COV-19 TEST NON-CDC HGH THRU: HCPCS

## 2022-01-18 ENCOUNTER — APPOINTMENT (OUTPATIENT)
Dept: GENERAL RADIOLOGY | Facility: HOSPITAL | Age: 32
End: 2022-01-18

## 2022-01-18 ENCOUNTER — HOSPITAL ENCOUNTER (OUTPATIENT)
Facility: HOSPITAL | Age: 32
Setting detail: HOSPITAL OUTPATIENT SURGERY
Discharge: HOME | End: 2022-01-18
Attending: ORTHOPAEDIC SURGERY | Admitting: ORTHOPAEDIC SURGERY

## 2022-01-18 ENCOUNTER — ANESTHESIA (OUTPATIENT)
Dept: PERIOP | Facility: HOSPITAL | Age: 32
End: 2022-01-18

## 2022-01-18 ENCOUNTER — ANESTHESIA EVENT (OUTPATIENT)
Dept: PERIOP | Facility: HOSPITAL | Age: 32
End: 2022-01-18

## 2022-01-18 VITALS
WEIGHT: 253.31 LBS | TEMPERATURE: 97.5 F | HEART RATE: 66 BPM | RESPIRATION RATE: 16 BRPM | SYSTOLIC BLOOD PRESSURE: 120 MMHG | OXYGEN SATURATION: 95 % | DIASTOLIC BLOOD PRESSURE: 68 MMHG | BODY MASS INDEX: 35.33 KG/M2

## 2022-01-18 DIAGNOSIS — S83.511D RUPTURE OF ANTERIOR CRUCIATE LIGAMENT OF RIGHT KNEE, SUBSEQUENT ENCOUNTER: ICD-10-CM

## 2022-01-18 PROCEDURE — 25010000002 FENTANYL CITRATE (PF) 50 MCG/ML SOLUTION: Performed by: NURSE ANESTHETIST, CERTIFIED REGISTERED

## 2022-01-18 PROCEDURE — 25010000002 ONDANSETRON PER 1 MG: Performed by: NURSE ANESTHETIST, CERTIFIED REGISTERED

## 2022-01-18 PROCEDURE — 76937 US GUIDE VASCULAR ACCESS: CPT

## 2022-01-18 PROCEDURE — 25010000002 ROPIVACAINE PER 1 MG: Performed by: ANESTHESIOLOGY

## 2022-01-18 PROCEDURE — 25010000002 DEXAMETHASONE PER 1 MG: Performed by: ANESTHESIOLOGY

## 2022-01-18 PROCEDURE — 25010000002 NEOSTIGMINE 5 MG/10ML SOLUTION: Performed by: NURSE ANESTHETIST, CERTIFIED REGISTERED

## 2022-01-18 PROCEDURE — 0 CEFAZOLIN IN DEXTROSE 2-4 GM/100ML-% SOLUTION: Performed by: ORTHOPAEDIC SURGERY

## 2022-01-18 PROCEDURE — 25010000002 MIDAZOLAM PER 1 MG: Performed by: ANESTHESIOLOGY

## 2022-01-18 PROCEDURE — C1889 IMPLANT/INSERT DEVICE, NOC: HCPCS | Performed by: ORTHOPAEDIC SURGERY

## 2022-01-18 PROCEDURE — 29888 ARTHRS AID ACL RPR/AGMNTJ: CPT | Performed by: ORTHOPAEDIC SURGERY

## 2022-01-18 PROCEDURE — 0 CEFAZOLIN PER 500 MG: Performed by: ORTHOPAEDIC SURGERY

## 2022-01-18 PROCEDURE — C1713 ANCHOR/SCREW BN/BN,TIS/BN: HCPCS | Performed by: ORTHOPAEDIC SURGERY

## 2022-01-18 PROCEDURE — 25010000002 PROPOFOL 10 MG/ML EMULSION: Performed by: NURSE ANESTHETIST, CERTIFIED REGISTERED

## 2022-01-18 PROCEDURE — 76000 FLUOROSCOPY <1 HR PHYS/QHP: CPT | Performed by: ORTHOPAEDIC SURGERY

## 2022-01-18 PROCEDURE — 25010000002 FENTANYL CITRATE (PF) 50 MCG/ML SOLUTION: Performed by: ANESTHESIOLOGY

## 2022-01-18 PROCEDURE — 73560 X-RAY EXAM OF KNEE 1 OR 2: CPT | Performed by: ORTHOPAEDIC SURGERY

## 2022-01-18 PROCEDURE — 25010000002 HYDROMORPHONE PER 4 MG: Performed by: NURSE ANESTHETIST, CERTIFIED REGISTERED

## 2022-01-18 DEVICE — SCRW CANN INTERF 7X20MM: Type: IMPLANTABLE DEVICE | Site: KNEE | Status: FUNCTIONAL

## 2022-01-18 DEVICE — SCRW CANN INTERFER FULLTHRD 8X20MM: Type: IMPLANTABLE DEVICE | Site: KNEE | Status: FUNCTIONAL

## 2022-01-18 RX ORDER — ONDANSETRON 4 MG/1
4 TABLET, FILM COATED ORAL EVERY 8 HOURS PRN
Qty: 9 TABLET | Refills: 0 | Status: SHIPPED | OUTPATIENT
Start: 2022-01-18 | End: 2022-04-05

## 2022-01-18 RX ORDER — CEPHALEXIN 500 MG/1
500 CAPSULE ORAL EVERY 12 HOURS
Qty: 14 CAPSULE | Refills: 0 | Status: SHIPPED | OUTPATIENT
Start: 2022-01-18 | End: 2022-09-22

## 2022-01-18 RX ORDER — ONDANSETRON 2 MG/ML
INJECTION INTRAMUSCULAR; INTRAVENOUS AS NEEDED
Status: DISCONTINUED | OUTPATIENT
Start: 2022-01-18 | End: 2022-01-18 | Stop reason: SURG

## 2022-01-18 RX ORDER — DEXAMETHASONE SODIUM PHOSPHATE 4 MG/ML
INJECTION, SOLUTION INTRA-ARTICULAR; INTRALESIONAL; INTRAMUSCULAR; INTRAVENOUS; SOFT TISSUE
Status: COMPLETED | OUTPATIENT
Start: 2022-01-18 | End: 2022-01-18

## 2022-01-18 RX ORDER — FENTANYL CITRATE 50 UG/ML
50 INJECTION, SOLUTION INTRAMUSCULAR; INTRAVENOUS
Status: DISCONTINUED | OUTPATIENT
Start: 2022-01-18 | End: 2022-01-18 | Stop reason: HOSPADM

## 2022-01-18 RX ORDER — LABETALOL HYDROCHLORIDE 5 MG/ML
5 INJECTION, SOLUTION INTRAVENOUS
Status: DISCONTINUED | OUTPATIENT
Start: 2022-01-18 | End: 2022-01-18 | Stop reason: HOSPADM

## 2022-01-18 RX ORDER — IBUPROFEN 600 MG/1
600 TABLET ORAL ONCE AS NEEDED
Status: DISCONTINUED | OUTPATIENT
Start: 2022-01-18 | End: 2022-01-18 | Stop reason: HOSPADM

## 2022-01-18 RX ORDER — ONDANSETRON 2 MG/ML
4 INJECTION INTRAMUSCULAR; INTRAVENOUS ONCE AS NEEDED
Status: COMPLETED | OUTPATIENT
Start: 2022-01-18 | End: 2022-01-18

## 2022-01-18 RX ORDER — FENTANYL CITRATE 50 UG/ML
INJECTION, SOLUTION INTRAMUSCULAR; INTRAVENOUS AS NEEDED
Status: DISCONTINUED | OUTPATIENT
Start: 2022-01-18 | End: 2022-01-18 | Stop reason: SURG

## 2022-01-18 RX ORDER — SODIUM CHLORIDE, SODIUM LACTATE, POTASSIUM CHLORIDE, CALCIUM CHLORIDE 600; 310; 30; 20 MG/100ML; MG/100ML; MG/100ML; MG/100ML
9 INJECTION, SOLUTION INTRAVENOUS CONTINUOUS
Status: DISCONTINUED | OUTPATIENT
Start: 2022-01-18 | End: 2022-01-18 | Stop reason: HOSPADM

## 2022-01-18 RX ORDER — NEOSTIGMINE METHYLSULFATE 0.5 MG/ML
INJECTION, SOLUTION INTRAVENOUS AS NEEDED
Status: DISCONTINUED | OUTPATIENT
Start: 2022-01-18 | End: 2022-01-18 | Stop reason: SURG

## 2022-01-18 RX ORDER — PROMETHAZINE HYDROCHLORIDE 25 MG/1
25 SUPPOSITORY RECTAL ONCE AS NEEDED
Status: DISCONTINUED | OUTPATIENT
Start: 2022-01-18 | End: 2022-01-18 | Stop reason: HOSPADM

## 2022-01-18 RX ORDER — PROPOFOL 10 MG/ML
VIAL (ML) INTRAVENOUS AS NEEDED
Status: DISCONTINUED | OUTPATIENT
Start: 2022-01-18 | End: 2022-01-18 | Stop reason: SURG

## 2022-01-18 RX ORDER — OXYCODONE AND ACETAMINOPHEN 7.5; 325 MG/1; MG/1
1 TABLET ORAL EVERY 4 HOURS PRN
Status: DISCONTINUED | OUTPATIENT
Start: 2022-01-18 | End: 2022-01-18 | Stop reason: HOSPADM

## 2022-01-18 RX ORDER — EPHEDRINE SULFATE 50 MG/ML
5 INJECTION, SOLUTION INTRAVENOUS ONCE AS NEEDED
Status: DISCONTINUED | OUTPATIENT
Start: 2022-01-18 | End: 2022-01-18 | Stop reason: HOSPADM

## 2022-01-18 RX ORDER — DIPHENHYDRAMINE HCL 25 MG
25 CAPSULE ORAL
Status: DISCONTINUED | OUTPATIENT
Start: 2022-01-18 | End: 2022-01-18 | Stop reason: HOSPADM

## 2022-01-18 RX ORDER — BUPIVACAINE HYDROCHLORIDE AND EPINEPHRINE 2.5; 5 MG/ML; UG/ML
INJECTION, SOLUTION EPIDURAL; INFILTRATION; INTRACAUDAL; PERINEURAL
Status: COMPLETED | OUTPATIENT
Start: 2022-01-18 | End: 2022-01-18

## 2022-01-18 RX ORDER — IPRATROPIUM BROMIDE AND ALBUTEROL SULFATE 2.5; .5 MG/3ML; MG/3ML
3 SOLUTION RESPIRATORY (INHALATION) ONCE AS NEEDED
Status: DISCONTINUED | OUTPATIENT
Start: 2022-01-18 | End: 2022-01-18 | Stop reason: HOSPADM

## 2022-01-18 RX ORDER — NALOXONE HCL 0.4 MG/ML
0.2 VIAL (ML) INJECTION AS NEEDED
Status: DISCONTINUED | OUTPATIENT
Start: 2022-01-18 | End: 2022-01-18 | Stop reason: HOSPADM

## 2022-01-18 RX ORDER — KETAMINE HYDROCHLORIDE 10 MG/ML
INJECTION INTRAMUSCULAR; INTRAVENOUS AS NEEDED
Status: DISCONTINUED | OUTPATIENT
Start: 2022-01-18 | End: 2022-01-18 | Stop reason: SURG

## 2022-01-18 RX ORDER — SODIUM CHLORIDE 0.9 % (FLUSH) 0.9 %
3 SYRINGE (ML) INJECTION EVERY 12 HOURS SCHEDULED
Status: DISCONTINUED | OUTPATIENT
Start: 2022-01-18 | End: 2022-01-18 | Stop reason: HOSPADM

## 2022-01-18 RX ORDER — OXYCODONE HYDROCHLORIDE AND ACETAMINOPHEN 5; 325 MG/1; MG/1
1-2 TABLET ORAL EVERY 4 HOURS PRN
Qty: 50 TABLET | Refills: 0 | Status: SHIPPED | OUTPATIENT
Start: 2022-01-18 | End: 2022-01-24 | Stop reason: SDUPTHER

## 2022-01-18 RX ORDER — LIDOCAINE HYDROCHLORIDE 10 MG/ML
0.5 INJECTION, SOLUTION EPIDURAL; INFILTRATION; INTRACAUDAL; PERINEURAL ONCE AS NEEDED
Status: DISCONTINUED | OUTPATIENT
Start: 2022-01-18 | End: 2022-01-18 | Stop reason: HOSPADM

## 2022-01-18 RX ORDER — ROPIVACAINE HYDROCHLORIDE 5 MG/ML
INJECTION, SOLUTION EPIDURAL; INFILTRATION; PERINEURAL
Status: COMPLETED | OUTPATIENT
Start: 2022-01-18 | End: 2022-01-18

## 2022-01-18 RX ORDER — FLUMAZENIL 0.1 MG/ML
0.2 INJECTION INTRAVENOUS AS NEEDED
Status: DISCONTINUED | OUTPATIENT
Start: 2022-01-18 | End: 2022-01-18 | Stop reason: HOSPADM

## 2022-01-18 RX ORDER — LIDOCAINE HYDROCHLORIDE 20 MG/ML
INJECTION, SOLUTION INFILTRATION; PERINEURAL AS NEEDED
Status: DISCONTINUED | OUTPATIENT
Start: 2022-01-18 | End: 2022-01-18 | Stop reason: SURG

## 2022-01-18 RX ORDER — GLYCOPYRROLATE 0.2 MG/ML
INJECTION INTRAMUSCULAR; INTRAVENOUS AS NEEDED
Status: DISCONTINUED | OUTPATIENT
Start: 2022-01-18 | End: 2022-01-18 | Stop reason: SURG

## 2022-01-18 RX ORDER — SODIUM CHLORIDE 0.9 % (FLUSH) 0.9 %
3-10 SYRINGE (ML) INJECTION AS NEEDED
Status: DISCONTINUED | OUTPATIENT
Start: 2022-01-18 | End: 2022-01-18 | Stop reason: HOSPADM

## 2022-01-18 RX ORDER — MIDAZOLAM HYDROCHLORIDE 1 MG/ML
1 INJECTION INTRAMUSCULAR; INTRAVENOUS
Status: COMPLETED | OUTPATIENT
Start: 2022-01-18 | End: 2022-01-18

## 2022-01-18 RX ORDER — DIPHENHYDRAMINE HYDROCHLORIDE 50 MG/ML
12.5 INJECTION INTRAMUSCULAR; INTRAVENOUS
Status: DISCONTINUED | OUTPATIENT
Start: 2022-01-18 | End: 2022-01-18 | Stop reason: HOSPADM

## 2022-01-18 RX ORDER — HYDRALAZINE HYDROCHLORIDE 20 MG/ML
5 INJECTION INTRAMUSCULAR; INTRAVENOUS
Status: DISCONTINUED | OUTPATIENT
Start: 2022-01-18 | End: 2022-01-18 | Stop reason: HOSPADM

## 2022-01-18 RX ORDER — HYDROMORPHONE HYDROCHLORIDE 1 MG/ML
0.5 INJECTION, SOLUTION INTRAMUSCULAR; INTRAVENOUS; SUBCUTANEOUS
Status: DISCONTINUED | OUTPATIENT
Start: 2022-01-18 | End: 2022-01-18 | Stop reason: HOSPADM

## 2022-01-18 RX ORDER — SODIUM CHLORIDE, SODIUM LACTATE, POTASSIUM CHLORIDE, AND CALCIUM CHLORIDE .6; .31; .03; .02 G/100ML; G/100ML; G/100ML; G/100ML
INJECTION, SOLUTION INTRAVENOUS AS NEEDED
Status: DISCONTINUED | OUTPATIENT
Start: 2022-01-18 | End: 2022-01-18 | Stop reason: HOSPADM

## 2022-01-18 RX ORDER — ROCURONIUM BROMIDE 10 MG/ML
INJECTION, SOLUTION INTRAVENOUS AS NEEDED
Status: DISCONTINUED | OUTPATIENT
Start: 2022-01-18 | End: 2022-01-18 | Stop reason: SURG

## 2022-01-18 RX ORDER — PROMETHAZINE HYDROCHLORIDE 25 MG/1
25 TABLET ORAL ONCE AS NEEDED
Status: DISCONTINUED | OUTPATIENT
Start: 2022-01-18 | End: 2022-01-18 | Stop reason: HOSPADM

## 2022-01-18 RX ORDER — HYDROCODONE BITARTRATE AND ACETAMINOPHEN 7.5; 325 MG/1; MG/1
1 TABLET ORAL ONCE AS NEEDED
Status: DISCONTINUED | OUTPATIENT
Start: 2022-01-18 | End: 2022-01-18 | Stop reason: HOSPADM

## 2022-01-18 RX ORDER — CEFAZOLIN SODIUM 2 G/100ML
2 INJECTION, SOLUTION INTRAVENOUS ONCE
Status: COMPLETED | OUTPATIENT
Start: 2022-01-18 | End: 2022-01-18

## 2022-01-18 RX ORDER — FAMOTIDINE 10 MG/ML
20 INJECTION, SOLUTION INTRAVENOUS ONCE
Status: COMPLETED | OUTPATIENT
Start: 2022-01-18 | End: 2022-01-18

## 2022-01-18 RX ORDER — SODIUM CHLORIDE, SODIUM LACTATE, POTASSIUM CHLORIDE, CALCIUM CHLORIDE 600; 310; 30; 20 MG/100ML; MG/100ML; MG/100ML; MG/100ML
100 INJECTION, SOLUTION INTRAVENOUS CONTINUOUS
Status: DISCONTINUED | OUTPATIENT
Start: 2022-01-18 | End: 2022-01-18 | Stop reason: HOSPADM

## 2022-01-18 RX ADMIN — FENTANYL CITRATE 50 MCG: 50 INJECTION INTRAMUSCULAR; INTRAVENOUS at 10:33

## 2022-01-18 RX ADMIN — CEFAZOLIN SODIUM 2 G: 2 INJECTION, SOLUTION INTRAVENOUS at 06:59

## 2022-01-18 RX ADMIN — OXYCODONE HYDROCHLORIDE AND ACETAMINOPHEN 1 TABLET: 7.5; 325 TABLET ORAL at 09:45

## 2022-01-18 RX ADMIN — FENTANYL CITRATE 50 MCG: 50 INJECTION INTRAMUSCULAR; INTRAVENOUS at 09:54

## 2022-01-18 RX ADMIN — ROCURONIUM BROMIDE 10 MG: 50 INJECTION INTRAVENOUS at 07:49

## 2022-01-18 RX ADMIN — FENTANYL CITRATE 50 MCG: 50 INJECTION INTRAMUSCULAR; INTRAVENOUS at 09:30

## 2022-01-18 RX ADMIN — ONDANSETRON 4 MG: 2 INJECTION INTRAMUSCULAR; INTRAVENOUS at 12:20

## 2022-01-18 RX ADMIN — ROPIVACAINE HYDROCHLORIDE 15 ML: 5 INJECTION EPIDURAL; INFILTRATION; PERINEURAL at 06:36

## 2022-01-18 RX ADMIN — HYDROMORPHONE HYDROCHLORIDE 0.5 MG: 1 INJECTION, SOLUTION INTRAMUSCULAR; INTRAVENOUS; SUBCUTANEOUS at 09:45

## 2022-01-18 RX ADMIN — FAMOTIDINE 20 MG: 10 INJECTION INTRAVENOUS at 06:17

## 2022-01-18 RX ADMIN — SODIUM CHLORIDE, POTASSIUM CHLORIDE, SODIUM LACTATE AND CALCIUM CHLORIDE 9 ML/HR: 600; 310; 30; 20 INJECTION, SOLUTION INTRAVENOUS at 06:17

## 2022-01-18 RX ADMIN — DEXAMETHASONE SODIUM PHOSPHATE 8 MG: 4 INJECTION, SOLUTION INTRAMUSCULAR; INTRAVENOUS at 08:16

## 2022-01-18 RX ADMIN — DEXAMETHASONE SODIUM PHOSPHATE 2 MG: 4 INJECTION, SOLUTION INTRAMUSCULAR; INTRAVENOUS at 06:36

## 2022-01-18 RX ADMIN — GLYCOPYRROLATE 0.4 MG: 0.2 INJECTION INTRAMUSCULAR; INTRAVENOUS at 08:29

## 2022-01-18 RX ADMIN — MIDAZOLAM 1 MG: 1 INJECTION INTRAMUSCULAR; INTRAVENOUS at 06:32

## 2022-01-18 RX ADMIN — LIDOCAINE HYDROCHLORIDE 80 MG: 20 INJECTION, SOLUTION INFILTRATION; PERINEURAL at 07:07

## 2022-01-18 RX ADMIN — MIDAZOLAM 1 MG: 1 INJECTION INTRAMUSCULAR; INTRAVENOUS at 06:22

## 2022-01-18 RX ADMIN — PROPOFOL 200 MG: 10 INJECTION, EMULSION INTRAVENOUS at 07:07

## 2022-01-18 RX ADMIN — NEOSTIGMINE METHYLSULFATE 3 MG: 0.5 INJECTION INTRAVENOUS at 08:29

## 2022-01-18 RX ADMIN — HYDROMORPHONE HYDROCHLORIDE 0.5 MG: 1 INJECTION, SOLUTION INTRAMUSCULAR; INTRAVENOUS; SUBCUTANEOUS at 10:29

## 2022-01-18 RX ADMIN — FENTANYL CITRATE 50 MCG: 50 INJECTION INTRAMUSCULAR; INTRAVENOUS at 06:22

## 2022-01-18 RX ADMIN — FENTANYL CITRATE 100 MCG: 50 INJECTION INTRAMUSCULAR; INTRAVENOUS at 07:04

## 2022-01-18 RX ADMIN — ROCURONIUM BROMIDE 60 MG: 50 INJECTION INTRAVENOUS at 07:07

## 2022-01-18 RX ADMIN — ONDANSETRON 4 MG: 2 INJECTION INTRAMUSCULAR; INTRAVENOUS at 08:29

## 2022-01-18 RX ADMIN — KETAMINE HYDROCHLORIDE 50 MG: 10 INJECTION INTRAMUSCULAR; INTRAVENOUS at 07:11

## 2022-01-18 RX ADMIN — BUPIVACAINE HYDROCHLORIDE AND EPINEPHRINE BITARTRATE 15 ML: 2.5; .005 INJECTION, SOLUTION EPIDURAL; INFILTRATION; INTRACAUDAL; PERINEURAL at 06:36

## 2022-01-18 RX ADMIN — PROPOFOL 25 MCG/KG/MIN: 10 INJECTION, EMULSION INTRAVENOUS at 07:10

## 2022-01-18 NOTE — ANESTHESIA PROCEDURE NOTES
Airway  Urgency: elective    Date/Time: 1/18/2022 7:09 AM  Airway not difficult    General Information and Staff    Patient location during procedure: OR  Anesthesiologist: Alejandro Cho DO  CRNA: Adele Grady CRNA    Indications and Patient Condition  Indications for airway management: airway protection    Preoxygenated: yes  MILS not maintained throughout  Mask difficulty assessment: 1 - vent by mask    Final Airway Details  Final airway type: endotracheal airway      Successful airway: ETT  Cuffed: yes   Successful intubation technique: direct laryngoscopy  Facilitating devices/methods: intubating stylet  Endotracheal tube insertion site: oral  Blade: Myah  Blade size: 3  ETT size (mm): 8.0  Cormack-Lehane Classification: grade I - full view of glottis  Placement verified by: chest auscultation   Cuff volume (mL): 9  Measured from: lips  Number of attempts at approach: 1  Assessment: lips, teeth, and gum same as pre-op and atraumatic intubation    Additional Comments  PreO2 100% face mask, IV induction, easy mask, DVL x1, cords noted, tube through, cuff up, EBBSH, +etCO2, = chest movement, tube secured in place, atraumatic, teeth and lips intact as preop.

## 2022-01-18 NOTE — PERIOPERATIVE NURSING NOTE
"Pt continues to report nausea and has vomited twice.  Pt allowed to rest with little improvement, additional zofran given.  Pt offered a phenergan suppository; RN encouraged him to take it, but he refused, stating, \"I just want to go home.\"     "

## 2022-01-18 NOTE — ANESTHESIA PROCEDURE NOTES
Peripheral Block    Pre-sedation assessment completed: 1/18/2022 6:32 AM    Patient reassessed immediately prior to procedure    Patient location during procedure: holding area  Start time: 1/18/2022 6:33 AM  Stop time: 1/18/2022 6:36 AM  Reason for block: at surgeon's request and post-op pain management  Performed by  Anesthesiologist: Alejandro Cho DO  Preanesthetic Checklist  Completed: patient identified, IV checked, site marked, risks and benefits discussed, surgical consent, monitors and equipment checked, pre-op evaluation and timeout performed  Prep:  Pt Position: supine  Sterile barriers:gloves, mask, cap and washed/disinfected hands  Prep: ChloraPrep  Patient monitoring: blood pressure monitoring, continuous pulse oximetry and EKG  Procedure    Sedation: yes  Performed under: local infiltration  Guidance:ultrasound guided    ULTRASOUND INTERPRETATION. Using ultrasound guidance a gauge needle was placed in close proximity to the femoral nerve, at which point, under ultrasound guidance anesthetic was injected in the area of the nerve and spread of the anesthesia was seen on ultrasound in close proximity thereto.  There were no abnormalities seen on ultrasound; a digital image was taken; and the patient tolerated the procedure with no complications. Images:still images obtained, printed/placed on chart    Laterality:right  Block Type:femoral  Injection Technique:single-shot  Needle Type:echogenic  Needle Gauge:22 G  Resistance on Injection: none    Medications Used: dexamethasone (DECADRON) injection, 2 mg  ropivacaine (NAROPIN) 0.5 % injection, 15 mL  bupivacaine-EPINEPHrine PF (MARCAINE w/EPI) 0.25% -[id number] injection, 15 mL  Med administered at 1/18/2022 6:36 AM      Medications  Comment:Ultrasound Interpretation:  Using ultrasound guidance the needle was placed in close proximity to the femoral nerve and anesthetic was injected in the area of the nerve and spread of the anesthetic was seen on  ultrasound in close proximity thereto.  There were no abnormalities seen on ultrasound; a digital image was taken; and the patient tolerated the procedure with no complications.     Post Assessment  Injection Assessment: negative aspiration for heme, no paresthesia on injection and incremental injection  Patient Tolerance:comfortable throughout block  Complications:no  Additional Notes  Ultrasound guidance was used to both view and verify local anesthetic placement and disbursement. In addition, it was used to evaluate the respective anatomy to determine needle approach and placement.

## 2022-01-18 NOTE — PERIOPERATIVE NURSING NOTE
Pt vomited his sprite when he attempted to ambulate.  Pt assisted back to bed, leg elevated and ice pack applied.

## 2022-01-18 NOTE — H&P
History & Physical       Patient: Shaheen Rivera    Date of Admission: 1/18/2022  5:31 AM    YOB: 1990    Medical Record Number: [id number]    Attending Physician: Marta Lal MD        Chief Complaints: Rupture of anterior cruciate ligament of right knee, subsequent encounter [S89.507L]      History of Present Illness: This patient has really over a year history of right knee pain he has an MRI and an exam which are consistent with an ACL tear and he presents for an autograft reconstruction.  Allergies: No Known Allergies    Medications:   Home Medications:  No current facility-administered medications on file prior to encounter.     Current Outpatient Medications on File Prior to Encounter   Medication Sig   • acyclovir (ZOVIRAX) 200 MG capsule Take 1 capsule by mouth 2 (Two) Times a Day.   • anastrozole (ARIMIDEX) 1 MG tablet Take 1 mg by mouth Daily.   • furosemide (LASIX) 40 MG tablet 40 mg 2 (Two) Times a Day.   • spironolactone (ALDACTONE) 25 MG tablet Take 25 mg by mouth Daily.   • apixaban (ELIQUIS) 5 MG tablet tablet Take 5 mg by mouth 2 (two) times a day. HOLD PER MD INSTR-48 HOURS PRIOR-   • Testosterone Cypionate (DEPOTESTOTERONE CYPIONATE) 200 MG/ML injection Inject 1 mL into the shoulder, thigh, or buttocks Every 7 (Seven) Days.     Current Medications:  Scheduled Meds:ceFAZolin, 2 g, Intravenous, Once  sodium chloride, 3 mL, Intravenous, Q12H      Continuous Infusions:lactated ringers, 9 mL/hr, Last Rate: 9 mL/hr (01/18/22 0617)      PRN Meds:.fentanyl  •  lidocaine PF 1%  •  sodium chloride    Past Medical History:   Diagnosis Date   • ADHD    • Atrial fibrillation (HCC) 12/28/2020   • COVID     Monoclonal Antibody Infusion on 12/28/2021   • COVID-19 vaccine administered    • HSV-2 (herpes simplex virus 2) infection    • Hx monoclonal drug therapy 12/23/2021    BAM-COVID [place]   • Low testosterone in male    • Sleep apnea     CPAP   • Splenic infarct    • Testosterone  deficiency    • Vitamin B 12 deficiency         Past Surgical History:   Procedure Laterality Date   • ADENOIDECTOMY     • APPENDECTOMY     • TONSILLECTOMY          Social History     Occupational History   • 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[employer])   Tobacco Use   • Smoking status: Never Smoker   • Smokeless tobacco: Never Used   Vaping Use   • Vaping Use: Never used   Substance and Sexual Activity   • Alcohol use: Yes     Comment: OCCASIONAL    • Drug use: No     Comment: former casual marijuana use (more than 2 years ago)   • Sexual activity: Defer      Social History     Social History Narrative   • Not on file        Family History   Problem Relation Age of Onset   • Lung cancer Mother 49        smoker   • Graves' disease Mother    • Lung cancer Father 52        smoker   • No Known Problems Brother    • Coronary artery disease Paternal Grandmother    • Thyroid disease Paternal Grandmother    • Suicidality Maternal Uncle         committed suicide   • Malig Hyperthermia Neg Hx        Review of Systems      Physical Exam: 31 y.o. male  General Appearance:    Alert, cooperative, in no acute distress                      Vitals:    01/18/22 0607 01/18/22 0617   BP: 115/75    BP Location: Right arm    Patient Position: Sitting    Pulse: 75    Resp: 14    Temp:  98 °F (36.7 °C)   TempSrc: Oral Oral   SpO2: 96%    Weight: 115 kg (253 lb 4.9 oz)         Head:  Normocephalic, without obvious abnormality, atraumatic   Eyes:          Conjunctivae and sclerae normal, no pallor, corneas clear,    Ears:  Ears appear intact with no abnormalities noted   Throat: No oral lesions, no thrush, oral mucosa moist   Neck: No adenopathy, supple, trachea midline, no thyromegaly,    Back:   No kyphosis present, no scoliosis present, no skin lesions,      erythema or scars, no tenderness to percussion or                   palpation,range of motion normal   Lungs:   Clear to auscultation, respirations regular, even and                  unlabored    Heart:  Regular rhythm and normal rate               Chest Wall:  No abnormalities observed   Abdomen:   Normal bowel sounds, no masses, no organomegaly, soft    nontender, nondistended, no guarding, no rebound   tenderness   Rectal:   Deferred   Extremities:  Moves all extremities well, no edema,   no cyanosis, no redness   Pulses: Pulses palpable and equal bilaterally   Skin: No bleeding, bruising or rash   Lymph nodes: No palpable adenopathy   Neurologic: Appears neurologic intact             Assessment:  Patient Active Problem List   Diagnosis   • Vitamin B 12 deficiency   • Hypogonadism in male   • Retinitis pigmentosa of both eyes   • ADHD (attention deficit hyperactivity disorder)   • GOKUL on CPAP   • HSV-2 infection   • Atrial fibrillation (HCC)   • Acute systolic CHF (congestive heart failure) (HCC)   • Rupture of anterior cruciate ligament of right knee   • Clinical diagnosis of COVID-19           Plan: All risks, benefits and alternatives were discussed.  Risks including but not exclusive to anesthetic complications, including death, MI, CVA, infection, bleeding DVT, PE,  fracture, residual pain and need for future surgery.  Patient understood all and agrees to proceed.  He also understands the inherent risk of patella fracture also understands in the event the graft is not good we have a fracture or break or any other violation of the graft a allograft would be a viable option

## 2022-01-18 NOTE — PERIOPERATIVE NURSING NOTE
Pt continues to complain about 7/10 pain.  Dr. Cho consulted, he informed the patient that his pain is expected to be covered by the [facility].  Pt unable to tolerate additional narcotic pain medication due to level of drowsiness and nausea.  Ice applied, reassurance provided.

## 2022-01-18 NOTE — ANESTHESIA POSTPROCEDURE EVALUATION
Patient: Shaheen Rivera    Procedure Summary     Date: 01/18/22 Room / Location:  [facility]  /  [facility]    Anesthesia Start: 0659 Anesthesia Stop: 0903    Procedure: KNEE ANTERIOR CRUCIATE LIGAMENT RECONSTRUCTION WITH AUTOGRAFT, RIGHT KNEE ARTHROSCOPY    (Right Knee) Diagnosis:       Rupture of anterior cruciate ligament of right knee, subsequent encounter      (Rupture of anterior cruciate ligament of right knee, subsequent encounter [S83.491D])    Surgeons: Marta Lal MD Provider: Alejandro Cho DO    Anesthesia Type: general with block ASA Status: 3          Anesthesia Type: general with block    Vitals  Vitals Value Taken Time   /71 01/18/22 1133   Temp 36.4 °C (97.5 °F) 01/18/22 1130   Pulse 69 01/18/22 1133   Resp 15 01/18/22 1130   SpO2 95 % 01/18/22 1133   Vitals shown include unvalidated device data.        Post Anesthesia Care and Evaluation    Patient location during evaluation: bedside  Patient participation: complete - patient participated  Level of consciousness: awake and alert  Pain management: adequate  Airway patency: patent  Anesthetic complications: No anesthetic complications  PONV Status: controlled  Cardiovascular status: acceptable and hemodynamically stable  Respiratory status: acceptable, nonlabored ventilation and spontaneous ventilation  Hydration status: acceptable    Comments: /71   Pulse 73   Temp 36.4 °C (97.5 °F) (Temporal)   Resp 15   Wt 115 kg (253 lb 4.9 oz)   SpO2 95%   BMI 35.33 kg/m²     POPC supplied by [facility] with continued effect in expected distribution

## 2022-01-18 NOTE — ANESTHESIA PREPROCEDURE EVALUATION
Anesthesia Evaluation     Patient summary reviewed   no history of anesthetic complications:  NPO Solid Status: > 8 hours  NPO Liquid Status: > 2 hours           Airway   Mallampati: II  TM distance: >3 FB  Neck ROM: full  No difficulty expected  Dental      Pulmonary     breath sounds clear to auscultation  (+) a smoker Former, sleep apnea on CPAP,   (-) shortness of breath, recent URI (COVID 12/28/21)  Cardiovascular     ECG reviewed  PT is on anticoagulation therapy  Patient on routine beta blocker and Beta blocker given within 24 hours of surgery  Rate: normal    (+) dysrhythmias Atrial Fib, CHF ,   (-) past MI, angina    ROS comment: Cardiac clearance received, at similar risk to age cohort will hold eliquis 48h.    Neuro/Psych  (+) psychiatric history ADHD,     (-) seizures, CVA  GI/Hepatic/Renal/Endo    (+) obesity,     (-) no renal disease, diabetes    Musculoskeletal     Abdominal    Substance History   Drug use: reports prvs marijuana use.     OB/GYN          Other                        Anesthesia Plan    ASA 3     general with block   (+Fem USGSS for POPC)  intravenous induction     Anesthetic plan, all risks, benefits, and alternatives have been provided, discussed and informed consent has been obtained with: patient.    Plan discussed with CRNA.

## 2022-01-18 NOTE — OP NOTE
ACL Reconstruction With Autograft Operative Note      Facility: [facility]  Patient Name: Shaheen Rivera  YOB: 1990  Date: 1/18/2022  Medical Record Number: [id number]      Pre-op Diagnosis:   Rupture of anterior cruciate ligament of right knee, subsequent encounter [S83.511D]       Post-Op Diagnosis Codes:     * Rupture of anterior cruciate ligament of right knee, subsequent encounter [S83.511D]      Procedure(s):  KNEE ANTERIOR CRUCIATE LIGAMENT RECONSTRUCTION WITH AUTOGRAFT, RIGHT KNEE ARTHROSCOPY     7 x 20 Arthrex screw on the femoral side 8 x 20 Arthrex screw on the tibial side  Surgeon(s):  Marta Lal MD McQuillen, Michael Wayne, MD first assist  First assist was used throughout the case for proper positioning of the patient, assisting with positioning of the leg and retraction of vital structures during the procedure itself and passage of the graft also preparation the graft so as a decrease tourniquet time right    Anesthesia: General with Block  Anesthesiologist: Alejandro Cho DO  CRNA: Adele Grady CRNA    Staff:   Circulator: Trinity Dominguez RN  Scrub Person: Aisha Andrade        Estimated Blood Loss: 10 cc    Specimens:  None     Drains: None    Findings: See Dictation    Complications: None    Indication for procedure: This patient is status post injury to there right knee sustaining injury to the ACL. They have an MRI and an exam which are consistent with ACL tear. They present for ACL reconstruction. They understand operative versus nonoperative management. They understand allograft versus autograft options and agree to proceed with an autograft reconstruction. They understand risks benefits and alternatives. Risk including but not exclusive to anesthetic complications including death MI CVA as well as infection bleeding DVT PE stiffness failure to relieve their symptoms and failure of graft, persistent anterior knee pain and need for future  surgery. They understand all of these and agree to proceed.      Description of procedure: Patient was taken to the operating room. They were placed supine on the operating table. After induction of adequate LMA anesthesia, femoral nerve block and IV antibiotics the underwent exam under anesthesia was symmetric and a positive Lachman and a positive pivot shift. A nonsterile tourniquet was applied. A place in the thigh ivan all prominent areas well padded and the leg was prepped and draped in usual sterile fashion. Standard lateral incision was made with 11 blade. Blunt trocar penetrated into the joint scope follow up Citizen of [place] began. The patella appeared to sit centrally within the trochlear groove the cartilage was intact on both sides the gutters supra patella pouch were normal. I then entered the medial compartment under spinal needle localization direct visualization a medial portal was established. This was done in vertical orientation. The medial compartment this was normal. The notch was evaluated the ACL was torn     he had a small remnant of the ACL remaining. I then entered lateral compartment.  This was normal as well    this point I elevated the tourniquet exited the space made a midline incision from the tip of the patella down to the tubercle with a 10 blade. I bluntly dissected down to the peritenon and then incised this with a 15 blade. The patellar tendon was identified from the proximal patellar side down to the tibial tubercle. A size 10 graft night was used to harvest the central one third of this tendon. The bone plugs were harvested taking care not to remove any excess bone. The graft was passed to the back table and was prepared to size 10 bone plugs one suture in the femoral side and 2 sutures in the tibial side. Simultaneously I prepared the notch, performing a notchplasty so as to visualize the most posterior aspect of the notch. Once this was done I used the Gold Acufex guide placed a  guidewire just anterior to the PCL and a point that was central to medial lateral compartments. This was then reamed to a size 10 under direct visualization. A shaver was placed to remove bony debris. I used the 7 mm over-the-top guide to place the Beath needle coming down on the lateral walls first possible. This was done under direct visualization. This was then reamed under direct visualization to an appropriate depth. A shaver was placed to remove bony debris. The graft was pulled into the joint without difficulty. The knee was placed in 90° of flexion and notch the anterior aspect of the femoral tunnel. The guidewire was then placed in a 7 x 20 screw was placed with excellent interference fit. I pulled inferiorly to ensure the stability. I then placed a guidewire anterior to the tibial bone plug and anterior to the graft under direct visualization. I rotated the graft 360°. An placed a 7 x 20 screw with excellent interference fit. I placed the scope back in the joint. There was no hardware visualize on the tibial side. The positioning of the graft was good there was no impingement. At this point I took 2 intraoperative x-rays which show good position of the graft good position of the hardware. Everything was thoroughly irrigated the patellar tendon was reapproximated with 0 Vicryl the peritenon with 2-0 Vicryl subjacent tissue with 2-0 Vicryl and the skin closed with a running forcep radicular stitch. The portals were closed with 2-0 Vicryl. Steri-Strip sterile dressings Ace wraps and a hinged knee brace locked at 0 were applied. He tolerated the procedure well and was taken to recovery room in good condition. All sponge and needle count were correct. The total tourniquet time was 69 minutes      Date: 1/18/2022  Time: 09:15 EST

## 2022-01-19 ENCOUNTER — TELEPHONE (OUTPATIENT)
Dept: ORTHOPEDIC SURGERY | Facility: CLINIC | Age: 32
End: 2022-01-19

## 2022-01-19 NOTE — TELEPHONE ENCOUNTER
Called to check on patient.  States that he is having moderate to severe pain in his operative knee.  States that the pain is all anterior knee.  Dressing is dry and intact and he denies that it is too tight.  He has good motion and sensation to his foot and ankle.  Calf is soft and nontender.  Patient initially was only taking 1 Percocet at a time however today he has started taking 2 tablets but still not getting any significant relief.  He has been keeping it elevated and using ice to the knee.  Discussed with SHILOH WELLS

## 2022-01-20 ENCOUNTER — TELEPHONE (OUTPATIENT)
Dept: ORTHOPEDIC SURGERY | Facility: CLINIC | Age: 32
End: 2022-01-20

## 2022-01-20 NOTE — TELEPHONE ENCOUNTER
HUB STAFF ATTEMPTED NON-CLINICAL WARM TRANSFER - NO ANSWER     Caller: Shaheen Rivera    Relationship: Self    Best call back number:     Who are you requesting to speak with (clinical staff, provider, specific staff member): [phone]     Do you know the name of the person who called: PREVIOUSLY SPOKE w/DR MORA WHO TOLD PATIENT TO COME IN FOR POST OP CHECK TODAY 01/20/22    PATIENT HAD RIGHT KNEE ACL RECONSTRUCTION & RIGHT KNEE ARTHROSCOPY THIS PAST TUES 01/18/22     Do you require a callback: YES     WILL BE A WORK COMP APPT & PLEASE ADVISE PATIENT WHICH OFFICE HE NEEDS TO COME TO FOR APPT TODAY     THANKS

## 2022-01-21 ENCOUNTER — TREATMENT (OUTPATIENT)
Dept: PHYSICAL THERAPY | Facility: CLINIC | Age: 32
End: 2022-01-21

## 2022-01-21 DIAGNOSIS — M25.561 ACUTE PAIN OF RIGHT KNEE: Primary | ICD-10-CM

## 2022-01-21 DIAGNOSIS — R26.9 GAIT DISTURBANCE: ICD-10-CM

## 2022-01-21 DIAGNOSIS — R29.898 WEAKNESS OF RIGHT LOWER EXTREMITY: ICD-10-CM

## 2022-01-21 DIAGNOSIS — Z98.890 S/P ACL RECONSTRUCTION: ICD-10-CM

## 2022-01-21 PROCEDURE — 97530 THERAPEUTIC ACTIVITIES: CPT | Performed by: PHYSICAL THERAPIST

## 2022-01-21 PROCEDURE — 97110 THERAPEUTIC EXERCISES: CPT | Performed by: PHYSICAL THERAPIST

## 2022-01-21 PROCEDURE — 97161 PT EVAL LOW COMPLEX 20 MIN: CPT | Performed by: PHYSICAL THERAPIST

## 2022-01-21 NOTE — PROGRESS NOTES
Physical Therapy Initial Evaluation and Plan of Care      Patient: Shaheen Rivera   : 1990  Diagnosis/ICD-10 Code:  Acute pain of right knee [M25.561]  Referring practitioner: Marta Lal MD  Date of Initial Visit: 2022  Today's Date: 2022  Patient seen for 1 sessions           Subjective Evaluation    History of Present Illness  Date of onset: 2020  Date of surgery: 2022  Mechanism of injury: Shaheen is s/p R ACL reconstruction with patella tendon graft on . He injured his knee at work when stepping out of a semi truck. He did have therapy to try and avoid surgery, but it still was very unstable for him. He is in his locked hinged knee brace using B crutches. He is in a lot of pain today. He had a DVT and A-fib on 20. He was in ICU for a week/10 days then underwent a cardioversion on . No PMHx that would affect his knee care      Patient Occupation: semi truck salesman Quality of life: good    Pain  Current pain rating: 10  At worst pain rating: 10  Location: R knee  Quality: sharp  Relieving factors: medications and ice (some relief)  Aggravating factors: ambulation, stairs, standing, sleeping and movement    Social Support  Patient lives at: been taking the elevator.  Lives with: alone    Treatments  Previous treatment: physical therapy  Patient Goals  Patient goals for therapy: decreased pain, decreased edema, increased strength, independence with ADLs/IADLs, return to sport/leisure activities, return to work and increased motion             Objective          Observations     Right Knee   Positive for edema (mild) and incision.     Additional Knee Observation Details  Removal of surgical bandages for wound check. Had minimal bleeding post op. Replaced with 2 4x4 gauze pads and paper tape to keep incision clean and dry    Tenderness     Right Knee   Tenderness in the inferior patella, lateral joint line and medial joint line.     Additional Tenderness  Details  Global tenderness of R knee    Active Range of Motion   Left Knee   Flexion: 129 degrees   Extension: 3 (of HE) degrees     Right Knee   Flexion: Right knee active flexion: unable to move knee on his own today.     Passive Range of Motion     Right Knee   Extension: 5 degrees     Strength/Myotome Testing     Left Hip   Planes of Motion   Flexion: 5  Abduction: 5  Adduction: 5  External rotation: 5    Right Hip   Planes of Motion   Flexion: 2+  Abduction: 3  Adduction: 3+  External rotation: 3    Left Knee   Flexion: 5  Extension: 5    Right Knee   Flexion: 2+  Extension: 1 (able to get a very slight contraction with quad set)  Quadriceps contraction: poor    Additional Strength Details  Unable to perform SLR on his own    Ambulation   Weight-Bearing Status   Weight-Bearing Status (Right): weight-bearing as tolerated    Assistive device used: underarm crutches    Additional Weight-Bearing Status Details  Able to put PWBing for about 10 feet through the R LE    Ambulation: Level Surfaces   Ambulation with assistive device: independent  Ambulation without assistive device: unable        Exercise:  -quad set 2x5 sec (getting only a very mild contraction)  -SLR with mod A from therapist, 2x5  -hamstring and calf stretch, 3x20 sec  -readjusted his brace after bandage removal.   -readjusted height of hand hold on the crutches  -education on brace wear, donning OZZIE hose, showering    Functional Outcome Score:   Knee Outcome Survey=19/80 or 24% (100% no disability)    Assessment & Plan     Assessment  Impairments: abnormal gait, abnormal muscle firing, abnormal or restricted ROM, activity intolerance, impaired balance, impaired physical strength, lacks appropriate home exercise program, pain with function and weight-bearing intolerance  Functional Limitations: sleeping, walking, uncomfortable because of pain, moving in bed, sitting, standing, stooping and unable to perform repetitive tasks  Assessment details:  Shaheen Rivera is a 31 y.o. year-old male referred to physical therapy for right knee pain s/p R ACL reconstruction on 2022. He presents with a evolving clinical presentation.  He has comorbidities of the recent surgery and personal factors of requiring a ride to therapy that may affect his progress in the plan of care.  Pt has decreased ROM in ext=5 deg and flex=unable to perform today, decreased strength 1/5 of his quad, and decreased flexibility of the HS and gastroc/soleus complex. Pt would benefit from therapy to help improve his ability to walk without assistance, perform stairs, and return to work and his active lifestyle.   Prognosis: good    Goals  Plan Goals: ST wks  1. Patient will be independent with education for symptom management, joint protection and strategies to minimize stress on affected tissues  2. Patient will be able to perform a SLR without a quad lag for improved control of his knee  3. Pt to improve R knee ROM to ext=0 deg and dzqheoh=460 deg for improved gait pattern  4. Pt to ambulate without an AD with no gait deficits    LT wks  1. Pt to improve R knee ROM to ext=2 deg of HE to zybeljv=537 deg for improved gait pattern  2. Pt to improve score on Knee Outcome Survey from 24% to 90% for overall functional improvement  3. Patient will increase R knee strength to 5/5 to improve functional mobility  4. Patient will negotiate stairs reciprocally without rail support   5. Patient will demonstrate an independent HEP for core and knee strength and flexibility/ROM.        Plan  Therapy options: will be seen for skilled therapy services  Planned modality interventions: cryotherapy, ultrasound, TENS, dry needling, thermotherapy (hydrocollator packs), electrical stimulation/Russian stimulation and high voltage pulsed current (pain management)  Other planned modality interventions: aquatic therapy  Planned therapy interventions: ADL retraining, balance/weight-bearing training,  flexibility, functional ROM exercises, gait training, home exercise program, IADL retraining, joint mobilization, manual therapy, motor coordination training, neuromuscular re-education, soft tissue mobilization, strengthening, stretching, therapeutic activities, transfer training, abdominal trunk stabilization and spinal/joint mobilization  Frequency: 2x week  Duration in weeks: 16  Treatment plan discussed with: patient        Timed:  Manual Therapy:         mins  60005;  Therapeutic Exercise:    15     mins  84728;     Neuromuscular Soumya:        mins  02024;    Therapeutic Activity:     10     mins  67229;     Gait Training:           mins  41710;     Ultrasound:          mins  38426;    Iontophoresis         mins 56896  Dry Needling        mins 20560/ 20561 (Self-pay)      Untimed:  Electrical Stimulation:         mins  54587 ( );  Traction:       mins  62262;   Low Eval     20     Mins  22682  Mod Eval          Mins  80612  High Eval                            Mins  02999    Timed Treatment:   25   mins   Total Treatment:     45   mins    PT SIGNATURE: Khushi Garcia, RONALD     License Number: [id number]    Electronically signed by Khushi Garcia PT, 01/21/22, 12:40 PM EST    DATE TREATMENT INITIATED: 1/21/2022    Initial Certification  Certification Period: 4/21/2022  I certify that the therapy services are furnished while this patient is under my care.  The services outlined above are required by this patient, and will be reviewed every 90 days.     PHYSICIAN: Marta Lal MD   AMBPTSIG     DATE:     Please sign and return via fax to [phone] Thank you, [facility].

## 2022-01-22 DIAGNOSIS — S83.511D RUPTURE OF ANTERIOR CRUCIATE LIGAMENT OF RIGHT KNEE, SUBSEQUENT ENCOUNTER: ICD-10-CM

## 2022-01-22 RX ORDER — OXYCODONE HYDROCHLORIDE AND ACETAMINOPHEN 5; 325 MG/1; MG/1
1-2 TABLET ORAL EVERY 4 HOURS PRN
Qty: 50 TABLET | Refills: 0 | Status: CANCELLED | OUTPATIENT
Start: 2022-01-22

## 2022-01-24 ENCOUNTER — TREATMENT (OUTPATIENT)
Dept: PHYSICAL THERAPY | Facility: CLINIC | Age: 32
End: 2022-01-24

## 2022-01-24 DIAGNOSIS — S83.511D RUPTURE OF ANTERIOR CRUCIATE LIGAMENT OF RIGHT KNEE, SUBSEQUENT ENCOUNTER: ICD-10-CM

## 2022-01-24 DIAGNOSIS — Z98.890 S/P ACL RECONSTRUCTION: ICD-10-CM

## 2022-01-24 DIAGNOSIS — M25.561 ACUTE PAIN OF RIGHT KNEE: Primary | ICD-10-CM

## 2022-01-24 DIAGNOSIS — R29.898 WEAKNESS OF RIGHT LOWER EXTREMITY: ICD-10-CM

## 2022-01-24 DIAGNOSIS — R26.9 GAIT DISTURBANCE: ICD-10-CM

## 2022-01-24 PROCEDURE — 97110 THERAPEUTIC EXERCISES: CPT | Performed by: PHYSICAL THERAPIST

## 2022-01-24 RX ORDER — OXYCODONE HYDROCHLORIDE AND ACETAMINOPHEN 5; 325 MG/1; MG/1
1-2 TABLET ORAL EVERY 4 HOURS PRN
Qty: 50 TABLET | Refills: 0 | Status: SHIPPED | OUTPATIENT
Start: 2022-01-24 | End: 2022-01-24

## 2022-01-24 RX ORDER — OXYCODONE HYDROCHLORIDE AND ACETAMINOPHEN 5; 325 MG/1; MG/1
1-2 TABLET ORAL EVERY 4 HOURS PRN
Qty: 50 TABLET | Refills: 0 | Status: SHIPPED | OUTPATIENT
Start: 2022-01-24 | End: 2022-04-05

## 2022-01-24 NOTE — TELEPHONE ENCOUNTER
----- Message from Shaheen Rivera sent at 1/22/2022  9:00 AM EST -----  Regarding: Pain   Will you please refill the pain pills I got about 8-10 left and my knee is killing.

## 2022-01-24 NOTE — PROGRESS NOTES
Physical Therapy Daily Progress Note    Patient: Shaheen Rivera   : 1990  Diagnosis/ICD-10 Code:  Acute pain of right knee [M25.561]  Referring practitioner: Marta Lal MD  Date of Initial Visit: Type: THERAPY  Noted: 2022  Today's Date: 2022  Patient seen for 2 sessions           Subjective Pain is down some to 8/10 today. Been trying to work on getting the quad to tighten    Objective          Active Range of Motion     Right Knee   Flexion: 40 degrees     Passive Range of Motion     Right Knee   Flexion: 50 degrees       Exercises:  -quad set (towel under knee) 3x5 holding for 5 sec  -hamstring and calf stretch 3x20 sec each  -ankle pumps x20  -longsitting heel slides, 10x 10 sec  -seated hamstring curl, foot on floor, x10  -SLR with Mod A from therapist, 2x5  -standing hip flexion and abd, 2x10 each in brace      Assessment/Plan  Pt is starting to be able to contract his R quad, but it is a very weak contraction. Unable to perform a SLR in supine, so having him do in standing in his brace, trying to contract the quad with each lift. Pt ok to get into shower (has a walk in shower), but needs to be very careful while having his brace off.          Timed:    Manual Therapy:         mins  53851;  Therapeutic Exercise:    43     mins  84744;     Neuromuscular Soumya:        mins  21200;    Therapeutic Activity:          mins  25528;     Gait Training:           mins  14099;     Ultrasound:          mins  86025;    Electrical Stimulation:         mins  31070 ( );  Iontophoresis         mins 55000;  Aquatic Therapy         mins 68652;  Dry Needling                   mins 88333/  (Self-pay)    Untimed:  Electrical Stimulation:         mins  05813 ( );  Traction:         mins  44803;     Timed Treatment:   43   mins   Total Treatment:     43   mins    Khushi Garcia, PT  Physical Therapist    [place] License:[id number]

## 2022-01-26 ENCOUNTER — TREATMENT (OUTPATIENT)
Dept: PHYSICAL THERAPY | Facility: CLINIC | Age: 32
End: 2022-01-26

## 2022-01-26 DIAGNOSIS — R26.9 GAIT DISTURBANCE: ICD-10-CM

## 2022-01-26 DIAGNOSIS — Z98.890 S/P ACL RECONSTRUCTION: ICD-10-CM

## 2022-01-26 DIAGNOSIS — M25.561 ACUTE PAIN OF RIGHT KNEE: Primary | ICD-10-CM

## 2022-01-26 DIAGNOSIS — R29.898 WEAKNESS OF RIGHT LOWER EXTREMITY: ICD-10-CM

## 2022-01-26 PROCEDURE — 97014 ELECTRIC STIMULATION THERAPY: CPT | Performed by: PHYSICAL THERAPIST

## 2022-01-26 PROCEDURE — 97110 THERAPEUTIC EXERCISES: CPT | Performed by: PHYSICAL THERAPIST

## 2022-01-26 NOTE — PROGRESS NOTES
Physical Therapy Daily Progress Note    Patient: Shaheen Rivera   : 1990  Diagnosis/ICD-10 Code:  Acute pain of right knee [M25.561]  Referring practitioner: Marta Lal MD  Date of Initial Visit: Type: THERAPY  Noted: 2022  Today's Date: 2022  Patient seen for 3 sessions           Subjective pain is a little better, not as swollen today    Objective          Passive Range of Motion     Right Knee   Flexion: 62 degrees       See Exercise, Manual, and Modality Logs for complete treatment.     Exercises:  -quad set (towel under knee) with NMES for 12 min, 5 sec on/off for contraction   -hamstring and calf stretch 3x20 sec each  -ankle pumps x20  -longsitting heel slides, 10x 10 sec  -seated hamstring curl, foot on floor, x10  -SLR with Min to Mod A from therapist, 3x5  -standing hip flexion and abd, 2x10 each in brace    Assessment/Plan  Pt brought his OZZIE hose and they were placed on the R LE with wear instructions. He is still having a hard time with a quad contraction. Had patient working on quad set with NMES, giving verbal cues for when to contract. Some improvement with AAROM knee flexion.          Timed:    Manual Therapy:         mins  62259;  Therapeutic Exercise:    35     mins  07220;     Neuromuscular Soumya:        mins  17217;    Therapeutic Activity:          mins  12287;     Gait Training:           mins  58919;     Ultrasound:          mins  60081;    Electrical Stimulation:    12     mins  12024 ( );  Iontophoresis         mins 07011;  Aquatic Therapy         mins 21199;  Dry Needling                   mins 97057/  (Self-pay)    Untimed:  Electrical Stimulation:         mins  25531 ( );  Traction:         mins  36320;     Timed Treatment:   47   mins   Total Treatment:     46   mins    Khushi Garcia, PT  Physical Therapist    [place] License:[id number]

## 2022-01-28 ENCOUNTER — TREATMENT (OUTPATIENT)
Dept: PHYSICAL THERAPY | Facility: CLINIC | Age: 32
End: 2022-01-28

## 2022-01-28 DIAGNOSIS — Z98.890 S/P ACL RECONSTRUCTION: ICD-10-CM

## 2022-01-28 DIAGNOSIS — R26.9 GAIT DISTURBANCE: ICD-10-CM

## 2022-01-28 DIAGNOSIS — R29.898 WEAKNESS OF RIGHT LOWER EXTREMITY: ICD-10-CM

## 2022-01-28 DIAGNOSIS — M25.561 ACUTE PAIN OF RIGHT KNEE: Primary | ICD-10-CM

## 2022-01-28 PROCEDURE — 97014 ELECTRIC STIMULATION THERAPY: CPT | Performed by: PHYSICAL THERAPIST

## 2022-01-28 PROCEDURE — 97110 THERAPEUTIC EXERCISES: CPT | Performed by: PHYSICAL THERAPIST

## 2022-01-28 NOTE — PROGRESS NOTES
Physical Therapy Daily Progress Note    Patient: Shaheen Rivera   : 1990  Diagnosis/ICD-10 Code:  Acute pain of right knee [M25.561]  Referring practitioner: Marta Lal MD  Date of Initial Visit: Type: THERAPY  Noted: 2022  Today's Date: 2022  Patient seen for 4 sessions           Subjective able to get my shoe on today    Objective          Active Range of Motion     Right Knee   Flexion: 75 degrees     Passive Range of Motion     Right Knee   Flexion: 91 degrees         Exercises:  -quad set (towel under knee) with NMES for 12 min, 5 sec on/off for contraction (attended with therapist for verbal cueing and increasing intensity)  -quad set 10x 5 sec off estim  -hamstring and calf stretch 3x20 sec each  -ankle pumps x20  -longsitting heel slides, 10x 10 sec  -seated hamstring curl, foot on floor, x10  -SLR with Min to Mod A from therapist, 3x5  defer  -standing hip flexion and abd, 2x10 each in brace  -passive stretching into flexion, 3x 20 sec    Assessment/Plan  Pt has been progressing with his quad contraction since starting the NMES the last two sessions. He is also starting to get some return of his hamstrings, improving his flexion to 75 deg seated (62 deg at last visit).          Timed:    Manual Therapy:         mins  56033;  Therapeutic Exercise:    40     mins  16614;     Neuromuscular Soumya:        mins  00959;    Therapeutic Activity:          mins  53242;     Gait Training:           mins  64169;     Ultrasound:          mins  64248;    Electrical Stimulation: 12        mins  32657 ( );  Iontophoresis         mins 77471;  Aquatic Therapy         mins 14020;  Dry Needling                   mins /  (Self-pay)    Untimed:  Electrical Stimulation:         mins  70450 ( );  Traction:         mins  94988;     Timed Treatment:   40   mins   Total Treatment:     40   mins    Khushi Garcia, PT  Physical Therapist    [place] License:[id number]

## 2022-01-31 ENCOUNTER — TREATMENT (OUTPATIENT)
Dept: PHYSICAL THERAPY | Facility: CLINIC | Age: 32
End: 2022-01-31

## 2022-01-31 DIAGNOSIS — R26.9 GAIT DISTURBANCE: ICD-10-CM

## 2022-01-31 DIAGNOSIS — M25.561 ACUTE PAIN OF RIGHT KNEE: Primary | ICD-10-CM

## 2022-01-31 DIAGNOSIS — Z98.890 S/P ACL RECONSTRUCTION: ICD-10-CM

## 2022-01-31 DIAGNOSIS — R29.898 WEAKNESS OF RIGHT LOWER EXTREMITY: ICD-10-CM

## 2022-01-31 PROCEDURE — 97110 THERAPEUTIC EXERCISES: CPT | Performed by: PHYSICAL THERAPIST

## 2022-01-31 NOTE — PROGRESS NOTES
Physical Therapy Daily Progress Note    Patient: Shaheen Rivera   : 1990  Diagnosis/ICD-10 Code:  Acute pain of right knee [M25.561]  Referring practitioner: Marta Lal MD  Date of Initial Visit: Type: THERAPY  Noted: 2022  Today's Date: 2022  Patient seen for 5 sessions           Subjective     Objective          Active Range of Motion     Right Knee   Flexion: 90 (foot on floor) degrees   Extension: 0 degrees     Passive Range of Motion     Right Knee   Flexion: 100 degrees       See Exercise, Manual, and Modality Logs for complete treatment.     Exercises:  -quad set (towel under knee) , 5 sec on/off for   -hamstring and calf stretch 3x20 sec each  -ankle pumps x20  -longsitting heel slides, 10x 10 sec  -seated hamstring curl, foot on floor, x10  -SLR with Min A from therapist, 3x5    -SL hip abd (pillow between legs) 2x10  -prone hip ext 2x10  -standing hip flexion, 2x10 each in brace  -passive stretching into flexion, 3x 20 sec    Assessment/Plan  Pt is 2 weeks post op tomorrow. He is off of the pain meds and is now just taking Aleve for pain control and icing. He has started to get a better contraction of his quad after using NMES for a few sessions. Still needed min A for SLR. He is using B crutches for community ambulation and single crutch around his home. ROM improving         Timed:    Manual Therapy:         mins  91529;  Therapeutic Exercise:    45     mins  58314;     Neuromuscular Soumya:        mins  00698;    Therapeutic Activity:          mins  71352;     Gait Training:           mins  60905;     Ultrasound:          mins  61230;    Electrical Stimulation:         mins  36171 ( );  Iontophoresis         mins 17679;  Aquatic Therapy         mins 09056;  Dry Needling                   mins 31827/ 80842 (Self-pay)    Untimed:  Electrical Stimulation:         mins  57458 ( );  Traction:         mins  79925;     Timed Treatment:   45   mins   Total Treatment:     45    mins    Khushi Garcia, PT  Physical Therapist    [place] License:[id number]

## 2022-02-01 ENCOUNTER — OFFICE VISIT (OUTPATIENT)
Dept: ORTHOPEDIC SURGERY | Facility: CLINIC | Age: 32
End: 2022-02-01

## 2022-02-01 VITALS — TEMPERATURE: 96.9 F | WEIGHT: 242 LBS | HEIGHT: 71 IN | BODY MASS INDEX: 33.88 KG/M2

## 2022-02-01 DIAGNOSIS — Z98.890 S/P ACL RECONSTRUCTION: Primary | ICD-10-CM

## 2022-02-01 PROCEDURE — 99024 POSTOP FOLLOW-UP VISIT: CPT | Performed by: ORTHOPAEDIC SURGERY

## 2022-02-01 NOTE — PROGRESS NOTES
;Right ACL follow Up 1st Visit      Patient: Shaheen Rivera        YOB: 1990      Chief Complaints: Right knee pain      History of Present Illness: Pt is here f/u knee arthroscopy, ACL reconstruction he is doing great he is off his pain medicine had a rough few days at first but he states he is doing much better his knee looks great he is progressing well with therapy and minding his restrictions        Allergies: No Known Allergies    Medications:   Home Medications:  Current Outpatient Medications on File Prior to Visit   Medication Sig   • acyclovir (ZOVIRAX) 200 MG capsule Take 1 capsule by mouth 2 (Two) Times a Day.   • anastrozole (ARIMIDEX) 1 MG tablet Take 1 mg by mouth Daily.   • apixaban (ELIQUIS) 5 MG tablet tablet Take 5 mg by mouth 2 (two) times a day. HOLD PER MD INSTR-48 HOURS PRIOR-   • carvedilol (COREG) 12.5 MG tablet Take 1.5 tablets by mouth 2 (Two) Times a Day.   • cephalexin (KEFLEX) 500 MG capsule Take 1 capsule by mouth Every 12 (Twelve) Hours.   • Entresto 24-26 MG tablet Take  by mouth 2 (Two) Times a Day.   • furosemide (LASIX) 40 MG tablet 40 mg 2 (Two) Times a Day.   • ondansetron (Zofran) 4 MG tablet Take 1 tablet by mouth Every 8 (Eight) Hours As Needed for Nausea or Vomiting.   • oxyCODONE-acetaminophen (PERCOCET) 5-325 MG per tablet Take 1-2 tablets by mouth Every 4 (Four) Hours As Needed for severe pain.   • spironolactone (ALDACTONE) 25 MG tablet Take 25 mg by mouth Daily.   • Testosterone Cypionate (DEPOTESTOTERONE CYPIONATE) 200 MG/ML injection Inject 1 mL into the shoulder, thigh, or buttocks Every 7 (Seven) Days.     No current facility-administered medications on file prior to visit.     Current Medications:  Scheduled Meds:  Continuous Infusions:No current facility-administered medications for this visit.    PRN Meds:.          Physical Exam: 31 y.o. male  General Appearance:    Alert, cooperative, in no acute distress                 There were no vitals  filed for this visit.   Patient is alert and oriented ×3 no acute distress normal mood physical exam.  Physical exam of the knee, incisions looked good there is no erythema, calf is soft and non-tender.  No sign or sx of DVT. Full ROM, Neg Lachman, Good SLR and quad control      Assessment  S/P knee scope.  I did review intraoperative findings and arthroscopic pictures with the patient.          Plan: To remove sutures today place Steri-Strips and start into  physical therapy and I will have thrm follow up in 4 weeks. Continue brace until f/u.  May d/c brace at night

## 2022-02-02 ENCOUNTER — TREATMENT (OUTPATIENT)
Dept: PHYSICAL THERAPY | Facility: CLINIC | Age: 32
End: 2022-02-02

## 2022-02-02 DIAGNOSIS — R26.9 GAIT DISTURBANCE: ICD-10-CM

## 2022-02-02 DIAGNOSIS — M25.561 ACUTE PAIN OF RIGHT KNEE: Primary | ICD-10-CM

## 2022-02-02 DIAGNOSIS — R29.898 WEAKNESS OF RIGHT LOWER EXTREMITY: ICD-10-CM

## 2022-02-02 DIAGNOSIS — Z98.890 S/P ACL RECONSTRUCTION: ICD-10-CM

## 2022-02-02 PROCEDURE — 97110 THERAPEUTIC EXERCISES: CPT | Performed by: PHYSICAL THERAPIST

## 2022-02-02 NOTE — PROGRESS NOTES
Physical Therapy Daily Progress Note    Patient: Shaheen Rivera   : 1990  Diagnosis/ICD-10 Code:  Acute pain of right knee [M25.561]  Referring practitioner: Marta Lal MD  Date of Initial Visit: Type: THERAPY  Noted: 2022  Today's Date: 2022  Patient seen for 6 sessions           Subjective able to take brace off when sleeping.    Objective          Active Range of Motion     Right Knee   Flexion: 110 (seated, foot on floor) degrees         Exercises:  -quad set (towel under knee) , 5 sec hold x20  -hamstring and calf stretch 3x20 sec each  -Nu Step level 4, 10 min working on knee flexion (in brace unlocked to 90 deg)  -longsitting heel slides, 10x 10 sec  -seated hamstring curl, foot on floor,2 x10 red band (given for home)  -SLR 2x10 in brace   -SL hip abd in brace (pillow between legs) 2x10  -prone hip ext 2x10 in brace    Assessment/Plan  Pt used single crutch while ambulating in the gym today. Set the flexion stop on his brace to 90 deg so he can unlock it while sitting. He knows to keep it locked out in full extension with standing/walking. He has made great improvements in his pain, ROM and strength this week. He is able to perform 10 SLR in his brace without assist. Continue to work on quad contraction         Timed:    Manual Therapy:         mins  33367;  Therapeutic Exercise:    45     mins  35093;     Neuromuscular Soumya:        mins  15383;    Therapeutic Activity:          mins  69322;     Gait Training:           mins  24539;     Ultrasound:          mins  71147;    Electrical Stimulation:         mins  38888 ( );  Iontophoresis         mins 34979;  Aquatic Therapy         mins 01210;  Dry Needling                   mins 67324/ 52662 (Self-pay)    Untimed:  Electrical Stimulation:         mins  21699 ( );  Traction:         mins  73643;     Timed Treatment:   45   mins   Total Treatment:     45   mins    Khushi Garcia, PT  Physical Therapist    [place]  License:[id number]

## 2022-02-07 ENCOUNTER — TREATMENT (OUTPATIENT)
Dept: PHYSICAL THERAPY | Facility: CLINIC | Age: 32
End: 2022-02-07

## 2022-02-07 DIAGNOSIS — R26.9 GAIT DISTURBANCE: ICD-10-CM

## 2022-02-07 DIAGNOSIS — R29.898 WEAKNESS OF RIGHT LOWER EXTREMITY: ICD-10-CM

## 2022-02-07 DIAGNOSIS — Z98.890 S/P ACL RECONSTRUCTION: ICD-10-CM

## 2022-02-07 DIAGNOSIS — M25.561 ACUTE PAIN OF RIGHT KNEE: Primary | ICD-10-CM

## 2022-02-07 PROCEDURE — 97110 THERAPEUTIC EXERCISES: CPT | Performed by: PHYSICAL THERAPIST

## 2022-02-07 NOTE — PROGRESS NOTES
Physical Therapy Daily Progress Note    Patient: Shaheen Rivera   : 1990  Diagnosis/ICD-10 Code:  Acute pain of right knee [M25.561]  Referring practitioner: Marta Lal MD  Date of Initial Visit: Type: THERAPY  Noted: 2022  Today's Date: 2022  Patient seen for 7 sessions           Subjective it was feeling like it was swelling up so I put the compression hose back on.     Objective          Active Range of Motion     Right Knee   Flexion: 116 degrees   Extension: 0 degrees         Exercises:    -Nu Step level 4, 10 min working on knee flexion (in brace unlocked to 90 deg)  -simon single leg press (from 80 deg to 10 deg), 3x20, 70 lb working on control  -longsitting heel slides, 10x 10 sec  -seated hamstring curl, foot on floor,2 x10 red band -SLR 2x10 in brace   -SL hip abd in brace (pillow between legs) 2x10  -prone hip ext 2x10 in brace  -quad set (towel under knee) , 5 sec hold x20  -hamstring and calf stretch 3x20 sec each    Assessment/Plan  Pt now using a single crutch in the community and no AD at home. He has improved his ROM to 0-116 deg and is taking Aleve for pain control (PRN). He reports some increased edema over the weekend so started wearing the OZZIE hose again. Tried a 2 inch step up, but unable to do today. Did well with the leg press, better control of the quad.          Timed:    Manual Therapy:         mins  28317;  Therapeutic Exercise:    43     mins  12526;     Neuromuscular Soumya:        mins  68074;    Therapeutic Activity:          mins  29427;     Gait Training:           mins  68255;     Ultrasound:          mins  90660;    Electrical Stimulation:         mins  32633 ( );  Iontophoresis         mins 98292;  Aquatic Therapy         mins 20602;  Dry Needling                   mins 80123/  (Self-pay)    Untimed:  Electrical Stimulation:         mins  05160 ( );  Traction:         mins  66649;     Timed Treatment:   43   mins   Total Treatment:     43    mins    Khushi Garcia, PT  Physical Therapist    [place] License:[id number]

## 2022-02-11 ENCOUNTER — TREATMENT (OUTPATIENT)
Dept: PHYSICAL THERAPY | Facility: CLINIC | Age: 32
End: 2022-02-11

## 2022-02-11 DIAGNOSIS — R29.898 WEAKNESS OF RIGHT LOWER EXTREMITY: ICD-10-CM

## 2022-02-11 DIAGNOSIS — R26.9 GAIT DISTURBANCE: ICD-10-CM

## 2022-02-11 DIAGNOSIS — Z98.890 S/P ACL RECONSTRUCTION: ICD-10-CM

## 2022-02-11 DIAGNOSIS — M25.561 ACUTE PAIN OF RIGHT KNEE: Primary | ICD-10-CM

## 2022-02-11 PROCEDURE — 97110 THERAPEUTIC EXERCISES: CPT | Performed by: PHYSICAL THERAPIST

## 2022-02-11 NOTE — PROGRESS NOTES
Physical Therapy Daily Progress Note    Patient: Shaheen Rivera   : 1990  Diagnosis/ICD-10 Code:  Acute pain of right knee [M25.561]  Referring practitioner: Marta Lal MD  Date of Initial Visit: Type: THERAPY  Noted: 2022  Today's Date: 2022  Patient seen for 8 sessions           Subjective I drove here today, no problems    Objective     Exercises:     -Nu Step level 4, 10 min working on knee flexion (in brace unlocked to 120 deg)  -simon single leg press (from 80 deg to 10 deg), 1x20, 70 lb, 2x 20 100lb  working on control  -longsitting heel slides, 10x 10 sec  defer  -seated hamstring curl, foot on floor,2 x10 red band -SLR 2x10 in brace    -SL hip abd in brace (pillow between legs) 2x10  -prone hip ext 2x10 in brace  defer  -quad set (towel under knee) , 5 sec hold x20  -hamstring and calf stretch 3x20 sec each  -recumbent bike, x5 min    Assessment/Plan  Pt has a [place] at home and was wondering if he would be able to start using it again. We tried an upright bike in the clinic and he had pain in the knee with a fill revolution. Moved to a recumbent bike and it went much better. He is using one crutch out in the community, no AD at home. Pain is well controlled. Flexion in sitting to 122 deg today (in brace).          Timed:    Manual Therapy:         mins  08078;  Therapeutic Exercise:    45     mins  45573;     Neuromuscular Soumya:        mins  79004;    Therapeutic Activity:          mins  42798;     Gait Training:           mins  92470;     Ultrasound:          mins  50093;    Electrical Stimulation:         mins  92248 ( );  Iontophoresis         mins 20806;  Aquatic Therapy         mins 65954;  Dry Needling                   mins 96017/  (Self-pay)    Untimed:  Electrical Stimulation:         mins  88639 ( );  Traction:         mins  26297;     Timed Treatment:   45   mins   Total Treatment:     45   mins    Khushi Garcia, PT  Physical Therapist    [place]  License:[id number]

## 2022-02-14 ENCOUNTER — TREATMENT (OUTPATIENT)
Dept: PHYSICAL THERAPY | Facility: CLINIC | Age: 32
End: 2022-02-14

## 2022-02-14 DIAGNOSIS — R26.9 GAIT DISTURBANCE: ICD-10-CM

## 2022-02-14 DIAGNOSIS — M25.561 ACUTE PAIN OF RIGHT KNEE: Primary | ICD-10-CM

## 2022-02-14 DIAGNOSIS — Z98.890 S/P ACL RECONSTRUCTION: ICD-10-CM

## 2022-02-14 DIAGNOSIS — R29.898 WEAKNESS OF RIGHT LOWER EXTREMITY: ICD-10-CM

## 2022-02-14 PROCEDURE — 97110 THERAPEUTIC EXERCISES: CPT | Performed by: PHYSICAL THERAPIST

## 2022-02-14 NOTE — PROGRESS NOTES
Physical Therapy Daily Progress Note    Patient: Shaheen Rivera   : 1990  Diagnosis/ICD-10 Code:  Acute pain of right knee [M25.561]  Referring practitioner: Marta Lal MD  Date of Initial Visit: Type: THERAPY  Noted: 2022  Today's Date: 2022  Patient seen for 9 sessions           Subjective pain hasn't been bad    Objective     Exercises:     -Nu Step level 4, 8 min working on knee flexion (in brace unlocked to 120 deg)  -simon single leg press (from 80 deg to 10 deg), 1x20 B, 100 lb, 2x 20 100lb  working on control  -calf raises x20  -mini squats, x10  -TKE w/red band in brace, 20x 3 sec hold  -seated hamstring curl, foot on floor,2 x10 red band -SLR 2x10 in brace    -SL hip abd in brace 2x10  -prone hip ext 2x10 in brace  defer  -quad set (towel under knee) , 5 sec hold x20  -hamstring and calf stretch 3x20 sec each  -recumbent bike, x5 min   defer    Assessment/Plan  Pt was a little more sore after last session trying to use the bike. He is progressing with his quad strength, adding TKE today to help with control into end range of extension. Still needing to use the single crutch out in the community with his brace to help reduce antalgia         Timed:    Manual Therapy:         mins  02274;  Therapeutic Exercise:    42     mins  87055;     Neuromuscular Soumya:        mins  01882;    Therapeutic Activity:          mins  16749;     Gait Training:           mins  30528;     Ultrasound:          mins  15147;    Electrical Stimulation:         mins  33502 ( );  Iontophoresis         mins 96657;  Aquatic Therapy         mins 47014;  Dry Needling                   mins 72177/  (Self-pay)    Untimed:  Electrical Stimulation:         mins  95404 ( );  Traction:         mins  40747;     Timed Treatment:   42   mins   Total Treatment:     42   mins    Khushi Garcia, PT  Physical Therapist    [place] License:[id number]

## 2022-02-18 ENCOUNTER — TREATMENT (OUTPATIENT)
Dept: PHYSICAL THERAPY | Facility: CLINIC | Age: 32
End: 2022-02-18

## 2022-02-18 DIAGNOSIS — R26.9 GAIT DISTURBANCE: ICD-10-CM

## 2022-02-18 DIAGNOSIS — M25.561 ACUTE PAIN OF RIGHT KNEE: Primary | ICD-10-CM

## 2022-02-18 DIAGNOSIS — Z98.890 S/P ACL RECONSTRUCTION: ICD-10-CM

## 2022-02-18 DIAGNOSIS — R29.898 WEAKNESS OF RIGHT LOWER EXTREMITY: ICD-10-CM

## 2022-02-18 PROCEDURE — 97110 THERAPEUTIC EXERCISES: CPT | Performed by: PHYSICAL THERAPIST

## 2022-02-18 PROCEDURE — 97530 THERAPEUTIC ACTIVITIES: CPT | Performed by: PHYSICAL THERAPIST

## 2022-02-18 NOTE — PROGRESS NOTES
Physical Therapy Daily Progress Note    Patient: Shaheen Rivera   : 1990  Diagnosis/ICD-10 Code:  Acute pain of right knee [M25.561]  Referring practitioner: Marta Lal MD  Date of Initial Visit: Type: THERAPY  Noted: 2022  Today's Date: 2022  Patient seen for 10 sessions           Subjective it was hurting yesterday    Objective          Active Range of Motion     Right Knee   Flexion: 122 degrees           Exercises:   -Nu Step level 4, 8 min working on knee flexion (in brace unlocked to 120 deg)  -simon single leg press (from 80 deg to 10 deg), 1x20 B, 100 lb, 2x 20 100lb  working on control  -calf raises x20  -mini squats, 2x10  -TKE w/red band in brace, 20x 3 sec hold  -seated hamstring curl, foot on floor,2 x10 red band -SLR 2x10 in brace    defer  -SL hip abd in brace 2x10  defer  -prone hip ext 2x10 in brace  defer  -quad set (towel under knee) , 5 sec hold x20 (verbal and tactile cues for VMO)  -hamstring and calf stretch 3x20 sec each  -tried 2 inch step up, but patient did struggle with it, had a little better quad contraction    Assessment/Plan  Pt still struggling with activating his VMO. He is going to work on it every hour, focusing on holding a sustained quad contraction as it fatigues easily         Timed:    Manual Therapy:         mins  47625;  Therapeutic Exercise:    30     mins  77807;     Neuromuscular Soumya:        mins  49306;    Therapeutic Activity:     13     mins  77432;     Gait Training:           mins  95166;     Ultrasound:          mins  34643;    Electrical Stimulation:         mins  35842 ( );  Iontophoresis         mins 45646;  Aquatic Therapy         mins 00965;  Dry Needling                   mins 36858/  (Self-pay)    Untimed:  Electrical Stimulation:         mins  53454 ( );  Traction:         mins  56443;     Timed Treatment:   43   mins   Total Treatment:     43   mins    Khushi Garcia, PT  Physical Therapist    [place]  License:[id number]

## 2022-02-21 ENCOUNTER — TREATMENT (OUTPATIENT)
Dept: PHYSICAL THERAPY | Facility: CLINIC | Age: 32
End: 2022-02-21

## 2022-02-21 ENCOUNTER — TELEPHONE (OUTPATIENT)
Dept: PHYSICAL THERAPY | Facility: CLINIC | Age: 32
End: 2022-02-21

## 2022-02-21 DIAGNOSIS — R29.898 WEAKNESS OF RIGHT LOWER EXTREMITY: ICD-10-CM

## 2022-02-21 DIAGNOSIS — M25.561 ACUTE PAIN OF RIGHT KNEE: Primary | ICD-10-CM

## 2022-02-21 DIAGNOSIS — R26.9 GAIT DISTURBANCE: ICD-10-CM

## 2022-02-21 DIAGNOSIS — Z98.890 S/P ACL RECONSTRUCTION: ICD-10-CM

## 2022-02-21 PROCEDURE — 97110 THERAPEUTIC EXERCISES: CPT | Performed by: PHYSICAL THERAPIST

## 2022-02-21 PROCEDURE — 97112 NEUROMUSCULAR REEDUCATION: CPT | Performed by: PHYSICAL THERAPIST

## 2022-02-21 NOTE — PROGRESS NOTES
Physical Therapy Daily Progress Note    Patient: Shaheen Rivera   : 1990  Diagnosis/ICD-10 Code:  Acute pain of right knee [M25.561]  Referring practitioner: Marta Lal MD  Date of Initial Visit: Type: THERAPY  Noted: 2022  Today's Date: 2022  Patient seen for 11 sessions           Subjective still not able to tighten the quad much    Objective       Exercises:   -Nu Step level 4, 8 min working on knee flexion (in brace unlocked to 120 deg)  -simon single leg press (from 80 deg to 10 deg), 1x20 B, 100 lb, 2x 20 100lb  working on control  -calf raises x20   defer  -mini squats, 2x10  defer  -TKE w/red band in brace, 20x 3 sec hold  -seated hamstring curl, foot on floor,2 x10 red band -SLR 2x10 in brace    defer  -SL hip abd in brace 2x10  defer  -prone hip ext 2x10 in brace  defer  -quad set (towel under knee) , 5 sec hold x10 min (verbal and tactile cues for VMO) with Russian stim  -hamstring and calf stretch 3x20 sec each      Assessment/Plan  Pt still struggling to get a contraction of his VMO. Started him back on e-stim today, which did allow the muscle to contract. Sending off Rx for a home unit to help him progress he quad strength. He still needs to use a single crutch for gait with brace locked into extension to protect the graft.          Timed:    Manual Therapy:         mins  26454;  Therapeutic Exercise:    35     mins  82005;     Neuromuscular Soumya:    10    mins  12177;    Therapeutic Activity:          mins  38889;     Gait Training:           mins  26593;     Ultrasound:          mins  95811;    Electrical Stimulation:  10       mins  48868 ( );  Iontophoresis         mins 43553;  Aquatic Therapy         mins 07457;  Dry Needling                   mins 61560/  (Self-pay)    Untimed:  Electrical Stimulation:         mins  09945 ( );  Traction:         mins  75365;     Timed Treatment:   45   mins   Total Treatment:     45   mins    Khushi Garcia,  PT  Physical Therapist    [place] License:[id number]

## 2022-02-21 NOTE — TELEPHONE ENCOUNTER
Shaheen Figueroa is still having difficulty with getting a contraction of his VMO. He does respond to e-stim in the clinic, but at this point he needs a home unit to continue to help him out. It has been limiting his ability to wean off of a single crutch. I am unsure if WC will want an Rx from you, or if the one that I send in will be sufficient.

## 2022-02-25 ENCOUNTER — TREATMENT (OUTPATIENT)
Dept: PHYSICAL THERAPY | Facility: CLINIC | Age: 32
End: 2022-02-25

## 2022-02-25 DIAGNOSIS — Z98.890 S/P ACL RECONSTRUCTION: ICD-10-CM

## 2022-02-25 DIAGNOSIS — R29.898 WEAKNESS OF RIGHT LOWER EXTREMITY: ICD-10-CM

## 2022-02-25 DIAGNOSIS — M25.561 ACUTE PAIN OF RIGHT KNEE: Primary | ICD-10-CM

## 2022-02-25 DIAGNOSIS — R26.9 GAIT DISTURBANCE: ICD-10-CM

## 2022-02-25 PROCEDURE — 97110 THERAPEUTIC EXERCISES: CPT | Performed by: PHYSICAL THERAPIST

## 2022-02-25 PROCEDURE — 97112 NEUROMUSCULAR REEDUCATION: CPT | Performed by: PHYSICAL THERAPIST

## 2022-02-25 NOTE — PROGRESS NOTES
30-Day / 10-Visit Progress Note         Patient: Shaheen Rivera   : 1990  Diagnosis/ICD-10 Code:  Acute pain of right knee [M25.561]  Referring practitioner: Marta Lal MD  Date of Initial Visit: Type: THERAPY  Noted: 2022  Today's Date: 2022  Patient seen for 12 sessions      Subjective:     Clinical Progress: improved  Home Program Compliance: Yes  Treatment has included:  therapeutic exercise, manual therapy, therapeutic activity, neuro-muscular retraining , gait training, electrical stimulation  and patient education with home exercise program     Subjective got the home stim unit in the mail today  Objective          Active Range of Motion     Right Knee   Flexion: 122 degrees   Extension: 1 (of HE) degrees     Strength/Myotome Testing     Right Hip   Planes of Motion   Flexion: 4  Abduction: 4    Right Knee   Flexion: 4  Extension: 2 (weakness of VMO)        See Exercise, Manual, and Modality Logs for complete treatment.   Exercises:  -quad set (towel under knee) , 10 sec hold x10 min (verbal and tactile cues for VMO) with Russian stim   -mini squats, 2x10  (cues for form)  -TKE w/green band in brace, 20x 3 sec hold (Verbal and tactile cues for VMO)  -seated hamstring curl, foot on floor,2 x10 green  -long sitting leg press, blue band (given for home), x15  -hamstring and calf stretch 3x20 sec each    Education on how to set up home estim unit for NMES and also for IFC. He is going to use the device 2x a day for NMES for 20 min at a time, 10/10 on/off time.     Functional Outcome Score:   Knee Outcome Survey=42/80 or 52.5%    Assessment & Plan     Assessment    Assessment details: Shaheen Rivera has been seen for 12 physical therapy sessions for s/p R ACL reconstruction.  Treatment has included therapeutic exercise, manual therapy, therapeutic activity, neuro-muscular retraining , gait training, electrical stimulation  and patient education with home exercise program . Progress to  physical therapy goals is fair. Shaheen is working hard with his HEP, but he is still having difficulty getting his quad (mainly the VMO) to engage. He got his home NMES unit today and will be working on it at home 2x daily. He has improved his ROM to 1 deg of HE to 122 deg. He is still in his hinged knee brace due to lack of quad control and using single crutch for community walking. He will benefit from continued skilled physical therapy to address remaining impairments and functional limitations.       Prognosis: good    Goals  Plan Goals: ST wks  1. Patient will be independent with education for symptom management, joint protection and strategies to minimize stress on affected tissues (PART MET)   2. Patient will be able to perform a SLR without a quad lag for improved control of his knee (ONGOING)   3. Pt to improve R knee ROM to ext=0 deg and yjjcajh=984 deg for improved gait pattern (MET)   4. Pt to ambulate without an AD with no gait deficits (ONGOING)     LT wks  1. Pt to improve R knee ROM to ext=2 deg of HE to bjnrubt=557 deg for improved gait pattern (ONGOING)   2. Pt to improve score on Knee Outcome Survey from 24% to 90% for overall functional improvement (ONGOING) improved to 52.5%  3. Patient will increase R knee strength to 5/5 to improve functional mobility (ONGOING)   4. Patient will negotiate stairs reciprocally without rail support (ONGOING)   5. Patient will demonstrate an independent HEP for core and knee strength and flexibility/ROM. (ONGOING)     Plan  Therapy options: will be seen for skilled therapy services  Frequency: 2x week  Duration in weeks: 8  Treatment plan discussed with: patient           Recommendations: Continue as planned  Timeframe: 2 months  Prognosis to achieve goals: good    PT Signature: Khushi Garcia, PT    License Number: [id number]    Electronically signed by Khushi Garcia, PT, 22, 10:32 AM EST      Based upon review of the patient's progress and  continued therapy plan, it is my medical opinion that Shaheen Rivera should continue physical therapy treatment at [facility]  [address] [place] [address] [phone].    Signature: __________________________________  Marta Lal MD    Timed:  Manual Therapy:         mins  96827;  Therapeutic Exercise:    30     mins  97948;     Neuromuscular Soumya:    15    mins  00276;    Therapeutic Activity:          mins  01837;     Gait Training:           mins  36826;     Ultrasound:          mins  37161;    Iontophoresis         mins 51622;  Dry Needling                   mins 41398/20561 (Self-pay)    Untimed:  Electrical Stimulation:         mins  86027 ( );  Traction:         mins  31482;     Timed Treatment:   45   mins   Total Treatment:     45   mins

## 2022-02-28 ENCOUNTER — TREATMENT (OUTPATIENT)
Dept: PHYSICAL THERAPY | Facility: CLINIC | Age: 32
End: 2022-02-28

## 2022-02-28 DIAGNOSIS — Z98.890 S/P ACL RECONSTRUCTION: ICD-10-CM

## 2022-02-28 DIAGNOSIS — R29.898 WEAKNESS OF RIGHT LOWER EXTREMITY: ICD-10-CM

## 2022-02-28 DIAGNOSIS — M25.561 ACUTE PAIN OF RIGHT KNEE: Primary | ICD-10-CM

## 2022-02-28 DIAGNOSIS — R26.9 GAIT DISTURBANCE: ICD-10-CM

## 2022-02-28 PROCEDURE — 97530 THERAPEUTIC ACTIVITIES: CPT | Performed by: PHYSICAL THERAPIST

## 2022-02-28 PROCEDURE — 97110 THERAPEUTIC EXERCISES: CPT | Performed by: PHYSICAL THERAPIST

## 2022-02-28 NOTE — PROGRESS NOTES
Physical Therapy Daily Progress Note    Patient: Shaheen Rivera   : 1990  Diagnosis/ICD-10 Code:  Acute pain of right knee [M25.561]  Referring practitioner: Marta Lal MD  Date of Initial Visit: Type: THERAPY  Noted: 2022  Today's Date: 2022  Patient seen for 13 sessions           Subjective having some issues with the home stim unit not wanting to work right    Objective     Exercise:  -NuStep, level 7 x5 min  -[place] leg press (from 90 deg to 5 deg), 1x20 B, 100 lb, 2x 20 100lb  working on control  -4 inch fwd step up, 2x10  -tried 4 inch lateral step ups, caused medial knee pain  -tried 2 inch step downs, pt felt unstable  -mini squats with hold, x15  -TKE with green band, 2x10 holding 5 sec  -seated hamstring curls, green x20  -bridge with ball squeeze, 2x10  -hamstring and calf stretching 3x20 sec    Assessment/Plan  Shaheen's quad is still slow to respond with strengthening, mainly the VMO. He does show improved contraction with CKC activities so starting to increase those into his program. Good ROM and hamstring strength. Continuing to keep Breg brace locked out with gait due to quad weakness         Timed:    Manual Therapy:         mins  75268;  Therapeutic Exercise:    35     mins  01607;     Neuromuscular Soumya:        mins  96135;    Therapeutic Activity:     8     mins  16149;     Gait Training:           mins  81282;     Ultrasound:          mins  05014;    Electrical Stimulation:         mins  35859 ( );  Iontophoresis         mins 55431;  Aquatic Therapy         mins 67744;  Dry Needling                   mins 41145/  (Self-pay)    Untimed:  Electrical Stimulation:         mins  03434 ( );  Traction:         mins  63217;     Timed Treatment:   43   mins   Total Treatment:     43   mins    Khushi Garcia PT, CDNT  Physical Therapist    [place] License:[id number]

## 2022-03-01 ENCOUNTER — OFFICE VISIT (OUTPATIENT)
Dept: ORTHOPEDIC SURGERY | Facility: CLINIC | Age: 32
End: 2022-03-01

## 2022-03-01 VITALS — TEMPERATURE: 98.2 F | BODY MASS INDEX: 32.07 KG/M2 | WEIGHT: 242 LBS | HEIGHT: 73 IN

## 2022-03-01 DIAGNOSIS — Z98.890 S/P ACL RECONSTRUCTION: Primary | ICD-10-CM

## 2022-03-01 PROCEDURE — 99024 POSTOP FOLLOW-UP VISIT: CPT | Performed by: ORTHOPAEDIC SURGERY

## 2022-03-01 NOTE — PROGRESS NOTES
Right ACL follow Up     Patient: Shaheen Rivera        YOB: 1990      Chief Complaints: right knee pain      History of Present Illness: Pt is here f/u knee arthroscopy, ACL reconstruction he is 6 weeks out is a bit frustrated because his quads are still down his got his motion feel like his pain is better he is off his pain medicine but his quads are still down.  His quads are definitely weaker than I would like to see him for down his age group at 6 weeks but I think everything is normal I think the nerve input is normal if put stress inferiorly on the patella and stretch the quad he can actually contracted.  I think he just has to be a bit more diligent on retraining the quad.        Allergies: No Known Allergies    Medications:   Home Medications:  Current Outpatient Medications on File Prior to Visit   Medication Sig   • acyclovir (ZOVIRAX) 200 MG capsule Take 1 capsule by mouth 2 (Two) Times a Day.   • anastrozole (ARIMIDEX) 1 MG tablet Take 1 mg by mouth Daily.   • apixaban (ELIQUIS) 5 MG tablet tablet Take 5 mg by mouth 2 (two) times a day. HOLD PER MD INSTR-48 HOURS PRIOR-   • carvedilol (COREG) 12.5 MG tablet Take 1.5 tablets by mouth 2 (Two) Times a Day.   • cephalexin (KEFLEX) 500 MG capsule Take 1 capsule by mouth Every 12 (Twelve) Hours.   • Entresto 24-26 MG tablet Take  by mouth 2 (Two) Times a Day.   • furosemide (LASIX) 40 MG tablet 40 mg 2 (Two) Times a Day.   • ondansetron (Zofran) 4 MG tablet Take 1 tablet by mouth Every 8 (Eight) Hours As Needed for Nausea or Vomiting.   • oxyCODONE-acetaminophen (PERCOCET) 5-325 MG per tablet Take 1-2 tablets by mouth Every 4 (Four) Hours As Needed for severe pain.   • spironolactone (ALDACTONE) 25 MG tablet Take 25 mg by mouth Daily.   • Testosterone Cypionate (DEPOTESTOTERONE CYPIONATE) 200 MG/ML injection Inject 1 mL into the shoulder, thigh, or buttocks Every 7 (Seven) Days.     No current facility-administered medications on file prior to  visit.     Current Medications:  Scheduled Meds:  Continuous Infusions:No current facility-administered medications for this visit.    PRN Meds:.          Physical Exam: 31 y.o. male  General Appearance:    Alert, cooperative, in no acute distress                 There were no vitals filed for this visit.   Patient is alert and oriented ×3 no acute distress normal mood physical exam.  Physical exam of the knee, incisions looked good there is no erythema, calf is soft and non-tender.  No sign or sx of DVT. Full ROM, Neg Lachman, Good SLR and quad control      Assessment  S/P knee scope. ACL reconstruction, he is doing okay he is a little bit behind on his quads which I think he is getting frustrated.  This poor aldo is been through a lot over the last year and I suspect his defenses are down he saw his quad contracted and I think he knows that it is there he just has to keep working on it      Plan: Continue PT, work on quads,  Follow up in 3 weeks

## 2022-03-04 ENCOUNTER — TREATMENT (OUTPATIENT)
Dept: PHYSICAL THERAPY | Facility: CLINIC | Age: 32
End: 2022-03-04

## 2022-03-04 DIAGNOSIS — Z98.890 S/P ACL RECONSTRUCTION: ICD-10-CM

## 2022-03-04 DIAGNOSIS — M25.561 ACUTE PAIN OF RIGHT KNEE: Primary | ICD-10-CM

## 2022-03-04 DIAGNOSIS — R26.9 GAIT DISTURBANCE: ICD-10-CM

## 2022-03-04 DIAGNOSIS — R29.898 WEAKNESS OF RIGHT LOWER EXTREMITY: ICD-10-CM

## 2022-03-04 PROCEDURE — 97530 THERAPEUTIC ACTIVITIES: CPT | Performed by: PHYSICAL THERAPIST

## 2022-03-04 PROCEDURE — 97110 THERAPEUTIC EXERCISES: CPT | Performed by: PHYSICAL THERAPIST

## 2022-03-04 NOTE — PROGRESS NOTES
Physical Therapy Daily Progress Note    Patient: Shaheen Rivera   : 1990  Diagnosis/ICD-10 Code:  Acute pain of right knee [M25.561]  Referring practitioner: Marta Lal MD  Date of Initial Visit: Type: THERAPY  Noted: 2022  Today's Date: 3/4/2022  Patient seen for 14 sessions           Subjective RTMD in 3 weeks    Objective       Exercise:  -NuStep, level 7 x10 min (on his own)  -Retro walking on the TM working on quad contraction, 8 min at 0.6 to 0.8 MPH  -[place] leg press (from 90 deg to 5 deg), 1x20 B, 100 lb, 2x 20 100lb  working on control  -Matrix hamstring curl, single leg, 15lb 2x10  -4 inch fwd step up, 2x10  -4 inch step up on L LE with R hip/knee flexion x20  -mini squats with hold, 2x15  -TKE with green band, 2x10 holding 5 sec  -hamstring and calf stretching 3x20 sec    KT using Y strip for R VMO faciliation    Assessment/Plan  Pt is still in locked out hinged knee brace with gait due to quad weakness, but he is weight shifting to the R and has a more equal stance time. Started him with retro walking on the TM to activate his quad, especially the VMO. Able to see a more visible contraction, some cues needed. He will work on this at the gym over the weekend         Timed:    Manual Therapy:     5    mins  46406;  Therapeutic Exercise:    30     mins  28945;     Neuromuscular Soumya:    10    mins  19488;    Therapeutic Activity:          mins  69262;     Gait Training:           mins  50107;     Ultrasound:          mins  75713;    Electrical Stimulation:         mins  56891 ( );  Iontophoresis         mins 20094;  Aquatic Therapy         mins 13030;  Dry Needling                   mins 98260/  (Self-pay)    Untimed:  Electrical Stimulation:         mins  92973 ( );  Traction:         mins  59109;     Timed Treatment:   45   mins   Total Treatment:     45   mins    Khushi Garcia PT, CDNT  Physical Therapist    [place] License:[id number]

## 2022-03-09 ENCOUNTER — TREATMENT (OUTPATIENT)
Dept: PHYSICAL THERAPY | Facility: CLINIC | Age: 32
End: 2022-03-09

## 2022-03-09 DIAGNOSIS — Z98.890 S/P ACL RECONSTRUCTION: ICD-10-CM

## 2022-03-09 DIAGNOSIS — M25.561 ACUTE PAIN OF RIGHT KNEE: Primary | ICD-10-CM

## 2022-03-09 DIAGNOSIS — R29.898 WEAKNESS OF RIGHT LOWER EXTREMITY: ICD-10-CM

## 2022-03-09 DIAGNOSIS — R26.9 GAIT DISTURBANCE: ICD-10-CM

## 2022-03-09 PROCEDURE — 97110 THERAPEUTIC EXERCISES: CPT | Performed by: PHYSICAL THERAPIST

## 2022-03-09 PROCEDURE — 97112 NEUROMUSCULAR REEDUCATION: CPT | Performed by: PHYSICAL THERAPIST

## 2022-03-09 PROCEDURE — 97530 THERAPEUTIC ACTIVITIES: CPT | Performed by: PHYSICAL THERAPIST

## 2022-03-11 ENCOUNTER — TELEPHONE (OUTPATIENT)
Dept: ORTHOPEDIC SURGERY | Facility: CLINIC | Age: 32
End: 2022-03-11

## 2022-03-11 ENCOUNTER — TREATMENT (OUTPATIENT)
Dept: PHYSICAL THERAPY | Facility: CLINIC | Age: 32
End: 2022-03-11

## 2022-03-11 DIAGNOSIS — R26.9 GAIT DISTURBANCE: ICD-10-CM

## 2022-03-11 DIAGNOSIS — M25.561 ACUTE PAIN OF RIGHT KNEE: Primary | ICD-10-CM

## 2022-03-11 DIAGNOSIS — R29.898 WEAKNESS OF RIGHT LOWER EXTREMITY: ICD-10-CM

## 2022-03-11 DIAGNOSIS — Z98.890 S/P ACL RECONSTRUCTION: ICD-10-CM

## 2022-03-11 PROCEDURE — 97112 NEUROMUSCULAR REEDUCATION: CPT | Performed by: PHYSICAL THERAPIST

## 2022-03-11 PROCEDURE — 97110 THERAPEUTIC EXERCISES: CPT | Performed by: PHYSICAL THERAPIST

## 2022-03-11 NOTE — TELEPHONE ENCOUNTER
Caller: ALFREDO    Relationship: W/C ADJUSTOR    Best call back number: [phone]    What form or medical record are you requesting: OFFICE NOTES FROM POST-OP APPTS TO PRESENT    Who is requesting this form or medical record from you: [employer]    How would you like to receive the form or medical records (pick-up, mail, fax): FAX  If fax, what is the fax number: [phone]    Timeframe paperwork needed: ASAP    Additional notes: NEEDS W/C CLAIM #[id number] WITH THE NOTES

## 2022-03-14 ENCOUNTER — TREATMENT (OUTPATIENT)
Dept: PHYSICAL THERAPY | Facility: CLINIC | Age: 32
End: 2022-03-14

## 2022-03-14 DIAGNOSIS — R26.9 GAIT DISTURBANCE: ICD-10-CM

## 2022-03-14 DIAGNOSIS — Z98.890 S/P ACL RECONSTRUCTION: ICD-10-CM

## 2022-03-14 DIAGNOSIS — M25.561 ACUTE PAIN OF RIGHT KNEE: Primary | ICD-10-CM

## 2022-03-14 DIAGNOSIS — R29.898 WEAKNESS OF RIGHT LOWER EXTREMITY: ICD-10-CM

## 2022-03-14 PROCEDURE — 97112 NEUROMUSCULAR REEDUCATION: CPT | Performed by: PHYSICAL THERAPIST

## 2022-03-14 PROCEDURE — 97530 THERAPEUTIC ACTIVITIES: CPT | Performed by: PHYSICAL THERAPIST

## 2022-03-14 PROCEDURE — 97110 THERAPEUTIC EXERCISES: CPT | Performed by: PHYSICAL THERAPIST

## 2022-03-14 NOTE — PROGRESS NOTES
Physical Therapy Daily Progress Note    Patient: Shaheen Rivera   : 1990  Diagnosis/ICD-10 Code:  Acute pain of right knee [M25.561]  Referring practitioner: Marta Lal MD  Date of Initial Visit: Type: THERAPY  Noted: 2022  Today's Date: 3/14/2022  Patient seen for 17 sessions           Subjective about the same    Objective      Exercise:  -NuStep, level 7 x10 min (on his own)  -Retro walking on the TM working on quad contraction, 8 min at 0.6 to 0.8 MPH  -[place] leg press (from 90 deg to 5 deg), 1x20 B, 130 lb, 2x 20 130lb  working on control  -Matrix hamstring curl, single leg, 15lb 4x10  -4 inch fwd step up, 2x10  -4 inch lateral step ups, x10  -4 inch step down to heel touch, x15  -Quad isometrics at 90 deg, 2x10x 10 sec and 60 deg,  2x10x 10 sec with tactile cues of the VMO  -quad set, 10x 10 sec  -mini squats with hold, 2x15    -TKE with green band, 2x10 holding 5 sec    -hamstring and calf stretching 3x20 sec    Assessment/Plan  Pt with improved control going up and down a 4 inch step today. He had some hesitancy and compensation with the first few reps of stepping down, but with more reps it did greatly improve.          Timed:    Manual Therapy:         mins  12628;  Therapeutic Exercise:    25     mins  42586;     Neuromuscular Soumya:    8    mins  09336;    Therapeutic Activity:    10      mins  15714;     Gait Training:           mins  09534;     Ultrasound:          mins  79955;    Electrical Stimulation:         mins  03562 ( );  Iontophoresis         mins 51652;  Aquatic Therapy         mins 46054;  Dry Needling                   mins 90918/  (Self-pay)    Untimed:  Electrical Stimulation:         mins  17991 ( );  Traction:         mins  02868;     Timed Treatment:    43  mins   Total Treatment:     43   mins    Khushi Garcia PT, CDNT  Physical Therapist    [place] License:[id number]

## 2022-03-18 ENCOUNTER — TREATMENT (OUTPATIENT)
Dept: PHYSICAL THERAPY | Facility: CLINIC | Age: 32
End: 2022-03-18

## 2022-03-18 DIAGNOSIS — Z98.890 S/P ACL RECONSTRUCTION: ICD-10-CM

## 2022-03-18 DIAGNOSIS — M25.561 ACUTE PAIN OF RIGHT KNEE: Primary | ICD-10-CM

## 2022-03-18 DIAGNOSIS — R26.9 GAIT DISTURBANCE: ICD-10-CM

## 2022-03-18 DIAGNOSIS — R29.898 WEAKNESS OF RIGHT LOWER EXTREMITY: ICD-10-CM

## 2022-03-18 PROCEDURE — 97014 ELECTRIC STIMULATION THERAPY: CPT | Performed by: PHYSICAL THERAPIST

## 2022-03-18 PROCEDURE — 97110 THERAPEUTIC EXERCISES: CPT | Performed by: PHYSICAL THERAPIST

## 2022-03-18 NOTE — PROGRESS NOTES
Physical Therapy Daily Progress Note    Patient: Shaheen Rivera   : 1990  Diagnosis/ICD-10 Code:  Acute pain of right knee [M25.561]  Referring practitioner: Marta Lal MD  Date of Initial Visit: Type: THERAPY  Noted: 2022  Today's Date: 3/18/2022  Patient seen for 18 sessions           Subjective the knee has been bothering me the past couple of days  Objective   See Exercise, Manual, and Modality Logs for complete treatment.     Exercise:  -NuStep, level 7 x10 min (on his own)  -Retro walking on the TM working on quad contraction, 5 min at 0.6 to 0.8 MPH  -[place] leg press (from 90 deg to 5 deg), 1x20 B, 130 lb, 2x 20 130lb  working on control  -Matrix hamstring curl, single leg, 15lb 4x10  -hamstring and calf stretching 3x20 sec    Assessment/Plan  Shaheen was retro walking on the TM in therapy today, at 0.8 mph. He had a sharp pain in the lateral compartment of the knee and had to stop. Had difficulty with WBing, sharp pain. With evaluation, his ACL graft is solid, no give with Lachman's or anterior drawer test. He did have pain with a modified lateral meniscus test and tenderness over the lateral joint line. He had no pop or catch or DAVID. Stopped exercise for the day to put patient on IFC and ice for pain control. He did have decreased pain after the session. Discussed holding on his squats and step ups over the weekend and ice more often         Timed:    Manual Therapy:         mins  68308;  Therapeutic Exercise:    20     mins  49532;     Neuromuscular Soumya:        mins  49537;    Therapeutic Activity:          mins  46401;     Gait Training:           mins  99592;     Ultrasound:          mins  03589;    Electrical Stimulation:    20     mins  78484 ( );  Iontophoresis         mins 50387;  Aquatic Therapy         mins 55074;  Dry Needling                   mins /  (Self-pay)    Untimed:  Electrical Stimulation:         mins  37997 ( );  Traction:         mins   53978;     Timed Treatment:   40   mins   Total Treatment:     40   mins    Khushi Garcia PT, CDNT  Physical Therapist    [place] License:[id number]

## 2022-03-24 ENCOUNTER — TREATMENT (OUTPATIENT)
Dept: PHYSICAL THERAPY | Facility: CLINIC | Age: 32
End: 2022-03-24

## 2022-03-24 ENCOUNTER — OFFICE VISIT (OUTPATIENT)
Dept: ORTHOPEDIC SURGERY | Facility: CLINIC | Age: 32
End: 2022-03-24

## 2022-03-24 VITALS — WEIGHT: 243 LBS | TEMPERATURE: 97.7 F | BODY MASS INDEX: 34.02 KG/M2 | HEIGHT: 71 IN

## 2022-03-24 DIAGNOSIS — Z98.890 S/P ACL RECONSTRUCTION: ICD-10-CM

## 2022-03-24 DIAGNOSIS — Z98.890 S/P ACL RECONSTRUCTION: Primary | ICD-10-CM

## 2022-03-24 DIAGNOSIS — M25.561 ACUTE PAIN OF RIGHT KNEE: Primary | ICD-10-CM

## 2022-03-24 DIAGNOSIS — R29.898 WEAKNESS OF RIGHT LOWER EXTREMITY: ICD-10-CM

## 2022-03-24 DIAGNOSIS — R26.9 GAIT DISTURBANCE: ICD-10-CM

## 2022-03-24 PROCEDURE — 97110 THERAPEUTIC EXERCISES: CPT | Performed by: PHYSICAL THERAPIST

## 2022-03-24 PROCEDURE — 97112 NEUROMUSCULAR REEDUCATION: CPT | Performed by: PHYSICAL THERAPIST

## 2022-03-24 PROCEDURE — 99024 POSTOP FOLLOW-UP VISIT: CPT | Performed by: ORTHOPAEDIC SURGERY

## 2022-03-24 PROCEDURE — 73560 X-RAY EXAM OF KNEE 1 OR 2: CPT | Performed by: ORTHOPAEDIC SURGERY

## 2022-03-24 RX ORDER — METHYLPREDNISOLONE 4 MG/1
TABLET ORAL
Qty: 21 TABLET | Refills: 0 | Status: SHIPPED | OUTPATIENT
Start: 2022-03-24 | End: 2022-04-05

## 2022-03-24 NOTE — PROGRESS NOTES
Right ACL follow Up     Patient: Shaheen Rivera        YOB: 1990      Chief Complaints: Right knee pain      History of Present Illness: Pt is here f/u knee arthroscopy, ACL reconstruction is finally getting his VMO to contract quads are getting a little better he is noticed onset of lateral knee pain with backward walking on the treadmill this was last Friday seems to be may be nerve that is irritated in that area        Allergies: No Known Allergies    Medications:   Home Medications:  Current Outpatient Medications on File Prior to Visit   Medication Sig   • acyclovir (ZOVIRAX) 200 MG capsule Take 1 capsule by mouth 2 (Two) Times a Day.   • anastrozole (ARIMIDEX) 1 MG tablet Take 1 mg by mouth Daily.   • apixaban (ELIQUIS) 5 MG tablet tablet Take 5 mg by mouth 2 (two) times a day. HOLD PER MD INSTR-48 HOURS PRIOR-   • carvedilol (COREG) 12.5 MG tablet Take 1.5 tablets by mouth 2 (Two) Times a Day.   • cephalexin (KEFLEX) 500 MG capsule Take 1 capsule by mouth Every 12 (Twelve) Hours.   • Entresto 24-26 MG tablet Take  by mouth 2 (Two) Times a Day.   • furosemide (LASIX) 40 MG tablet 40 mg 2 (Two) Times a Day.   • ondansetron (Zofran) 4 MG tablet Take 1 tablet by mouth Every 8 (Eight) Hours As Needed for Nausea or Vomiting.   • oxyCODONE-acetaminophen (PERCOCET) 5-325 MG per tablet Take 1-2 tablets by mouth Every 4 (Four) Hours As Needed for severe pain.   • spironolactone (ALDACTONE) 25 MG tablet Take 25 mg by mouth Daily.   • Testosterone Cypionate (DEPOTESTOTERONE CYPIONATE) 200 MG/ML injection Inject 1 mL into the shoulder, thigh, or buttocks Every 7 (Seven) Days.     No current facility-administered medications on file prior to visit.     Current Medications:  Scheduled Meds:  Continuous Infusions:No current facility-administered medications for this visit.    PRN Meds:.          Physical Exam: 31 y.o. male  General Appearance:    Alert, cooperative, in no acute distress                 There  were no vitals filed for this visit.   Patient is alert and oriented ×3 no acute distress normal mood physical exam.  Physical exam of the knee, incisions looked good there is no erythema, calf is soft and non-tender.  No sign or sx of DVT. Full ROM, Neg Lachman, Good SLR and quad control  Quads are definitely better they are definitely firing he still behind his range of motion is quite good still complained of this lateral knee pain it is palpable or tender no definitive Tinel's but is kind of hypersensitive in the lateral aspect  X-rays AP and lateral of the right knee were taken to evaluate his symptoms I have compared to x-rays done interoperative he has evidence of ACL reconstruction with femoral tibial screw that appear to be in excellent position no acute pathology  Assessment  S/P knee scope. ACL reconstruction, overall doing better with this lateral pain as it could be nerve his quads are definitely better plan is to put him on a steroid pack.  He is on blood thinners were cautioned him about any belly pain he knows to watch that he will continue with physical therapy he will wean to short hinged knee brace which we will give him today and I will see him back in 4 weeks  Plan: Continue PT, work on quads,  Follow up in 4-5 weeks.

## 2022-03-24 NOTE — PROGRESS NOTES
Physical Therapy Daily Progress Note    Patient: Shaheen Rivera   : 1990  Diagnosis/ICD-10 Code:  Acute pain of right knee [M25.561]  Referring practitioner: Marta Lal MD  Date of Initial Visit: Type: THERAPY  Noted: 2022  Today's Date: 3/24/2022  Patient seen for 19 sessions           Subjective saw MD today. Staying in brace. She feels that the pains are the nerves regenerating, putting me on another steroid dose pack    Objective        Exercise:  -NuStep, level 7 x10 min (on his own)  -Retro walking on the TM working on quad contraction, 8 min at 0.6 to 0.8 MPH  -[place] leg press (from 90 deg to 5 deg), 1x20 B, 130 lb, 2x 20 150lb  working on control  -Matrix hamstring curl, single leg, 15lb 4x10  -Quad isometrics at 90 deg, 2x10x 10 sec and 60 deg,  1x10x 10 sec   -quad set, 10x 10 sec  -mini squats with hold, 1x15    -TKE with green band, 2x10 holding 5 sec    -hamstring and calf stretching 3x20 sec    Assessment/Plan  Shaheen is still having some sharp pains in the VMO and along the lateral joint line at times. MD gave him the clearance to start back into his CKC activities, graft looks good on x-ray.  He is getting a better contraction of his quads over the last couple of weeks. Still keeping him in the hinged brace until he gets stronger.          Timed:    Manual Therapy:         mins  24566;  Therapeutic Exercise:    28     mins  20104;     Neuromuscular Soumya:    10    mins  93903;    Therapeutic Activity:          mins  17910;     Gait Training:           mins  04820;     Ultrasound:          mins  69919;    Electrical Stimulation:         mins  57328 ( );  Iontophoresis         mins 43958;  Aquatic Therapy         mins 79889;  Dry Needling                   mins 97675/  (Self-pay)    Untimed:  Electrical Stimulation:         mins  31823 ( );  Traction:         mins  90910;     Timed Treatment:   38   mins   Total Treatment:     38   mins    Khushi Garcia PT,  CDNT  Physical Therapist    [place] License:[id number]

## 2022-04-05 ENCOUNTER — OFFICE VISIT (OUTPATIENT)
Dept: INTERNAL MEDICINE | Age: 32
End: 2022-04-05

## 2022-04-05 VITALS
BODY MASS INDEX: 34.02 KG/M2 | HEIGHT: 71 IN | DIASTOLIC BLOOD PRESSURE: 70 MMHG | OXYGEN SATURATION: 98 % | WEIGHT: 243 LBS | HEART RATE: 64 BPM | TEMPERATURE: 96.8 F | SYSTOLIC BLOOD PRESSURE: 100 MMHG

## 2022-04-05 DIAGNOSIS — J30.9 ALLERGIC RHINITIS, UNSPECIFIED SEASONALITY, UNSPECIFIED TRIGGER: ICD-10-CM

## 2022-04-05 DIAGNOSIS — J18.9 PNEUMONIA OF LEFT LOWER LOBE DUE TO INFECTIOUS ORGANISM: Primary | ICD-10-CM

## 2022-04-05 DIAGNOSIS — I48.0 PAROXYSMAL ATRIAL FIBRILLATION: ICD-10-CM

## 2022-04-05 LAB
EXPIRATION DATE: NORMAL
FLUAV AG UPPER RESP QL IA.RAPID: NOT DETECTED
FLUBV AG UPPER RESP QL IA.RAPID: NOT DETECTED
INTERNAL CONTROL: NORMAL
Lab: NORMAL
SARS-COV-2 AG UPPER RESP QL IA.RAPID: NOT DETECTED

## 2022-04-05 PROCEDURE — 99214 OFFICE O/P EST MOD 30 MIN: CPT | Performed by: INTERNAL MEDICINE

## 2022-04-05 PROCEDURE — 87428 SARSCOV & INF VIR A&B AG IA: CPT | Performed by: INTERNAL MEDICINE

## 2022-04-05 RX ORDER — GUAIFENESIN AND DEXTROMETHORPHAN HYDROBROMIDE 1200; 60 MG/1; MG/1
1 TABLET, EXTENDED RELEASE ORAL 2 TIMES DAILY
Qty: 20 TABLET | Refills: 0 | COMMUNITY
Start: 2022-04-05 | End: 2022-04-15

## 2022-04-05 RX ORDER — ALBUTEROL SULFATE 90 UG/1
2 AEROSOL, METERED RESPIRATORY (INHALATION) EVERY 4 HOURS PRN
Qty: 8 G | Refills: 0 | Status: SHIPPED | OUTPATIENT
Start: 2022-04-05 | End: 2022-04-22

## 2022-04-05 RX ORDER — CETIRIZINE HYDROCHLORIDE 10 MG/1
10 TABLET ORAL NIGHTLY
COMMUNITY
Start: 2022-04-05

## 2022-04-05 RX ORDER — FLUTICASONE PROPIONATE 50 MCG
SPRAY, SUSPENSION (ML) NASAL
COMMUNITY
Start: 2022-04-05

## 2022-04-05 NOTE — PROGRESS NOTES
MyChart:    Here are the result(s) of your test(s):     Rapid COVID-19 antigen and flu tests were negative.     Please do not hesitate to contact me if you have questions.

## 2022-04-05 NOTE — PROGRESS NOTES
"    I N T E R N A L  M E D I C I N E  J U N O H  K I M,  M D      ENCOUNTER DATE:  04/05/2022    Shaheen Rivera / 31 y.o. / male      CHIEF COMPLAINT / REASON FOR OFFICE VISIT     Cough (Couple months . Dx for Pneumonia 4/1/22 at urgent care)      ASSESSMENT & PLAN     1. Pneumonia of left lower lobe due to infectious organism    2. Allergic rhinitis, unspecified seasonality, unspecified trigger    3. Paroxysmal atrial fibrillation (HCC)      Orders Placed This Encounter   Procedures   • POCT SARS-CoV-2 Antigen SOFYA     New Medications Ordered This Visit   Medications   • Dextromethorphan-guaiFENesin (Mucinex DM Maximum Strength)  MG tablet sustained-release 12 hour     Sig: Take 1 tablet by mouth 2 (Two) Times a Day for 10 days.     Dispense:  20 tablet     Refill:  0   • cetirizine (zyrTEC) 10 MG tablet     Sig: Take 1 tablet by mouth Every Night.   • fluticasone (FLONASE) 50 MCG/ACT nasal spray     Sig: Administer 1spray in each nostril twice daily.   • albuterol sulfate  (90 Base) MCG/ACT inhaler     Sig: Inhale 2 puffs Every 4 (Four) Hours As Needed for Wheezing or Shortness of Air.     Dispense:  8 g     Refill:  0       SUMMARY/DISCUSSION  • Complete course of azithromycin and cefdinir as prescribed by urgent care center.  • Start taking Mucinex DM maximum strength twice daily.  • Albuterol HFA inhaler as needed  • Start cetirizine 10 mg and Flonase daily  • He was instructed to call Thursday or Friday if his symptoms worsen.  Will plan to check a chest x-ray if needed.  • Follow-up in 1 week      Next Appointment with me: 9/22/2022    Return in about 1 week (around 4/12/2022) for Reassess today's problem(s).        VITAL SIGNS     Visit Vitals  /70 (BP Location: Left arm)   Pulse 64   Temp 96.8 °F (36 °C)   Ht 180.3 cm (71\")   Wt 110 kg (243 lb) Comment: per pt   SpO2 98%   BMI 33.89 kg/m²       BP Readings from Last 3 Encounters:   04/05/22 100/70   01/18/22 120/68   01/04/22 123/77 "     Wt Readings from Last 3 Encounters:   04/05/22 110 kg (243 lb)   03/24/22 110 kg (243 lb)   03/01/22 110 kg (242 lb)     Body mass index is 33.89 kg/m².    Blood pressure readings recorded on patient flowsheet:  No flowsheet data found.     MEDICATIONS AT THE TIME OF OFFICE VISIT     Current Outpatient Medications on File Prior to Visit   Medication Sig   • acyclovir (ZOVIRAX) 200 MG capsule Take 1 capsule by mouth 2 (Two) Times a Day.   • anastrozole (ARIMIDEX) 1 MG tablet Take 1 mg by mouth Daily.   • apixaban (ELIQUIS) 5 MG tablet tablet Take 5 mg by mouth 2 (two) times a day. HOLD PER MD INSTR-48 HOURS PRIOR-   • carvedilol (COREG) 12.5 MG tablet Take 1.5 tablets by mouth 2 (Two) Times a Day.   • cephalexin (KEFLEX) 500 MG capsule Take 1 capsule by mouth Every 12 (Twelve) Hours.   • Entresto 24-26 MG tablet Take  by mouth 2 (Two) Times a Day.   • furosemide (LASIX) 40 MG tablet 40 mg 2 (Two) Times a Day.   • spironolactone (ALDACTONE) 25 MG tablet Take 25 mg by mouth Daily.   • Testosterone Cypionate (DEPOTESTOTERONE CYPIONATE) 200 MG/ML injection Inject 1 mL into the shoulder, thigh, or buttocks Every 7 (Seven) Days.   • [DISCONTINUED] methylPREDNISolone (MEDROL) 4 MG dose pack Use as directed by package instructions   • [DISCONTINUED] ondansetron (Zofran) 4 MG tablet Take 1 tablet by mouth Every 8 (Eight) Hours As Needed for Nausea or Vomiting.   • [DISCONTINUED] oxyCODONE-acetaminophen (PERCOCET) 5-325 MG per tablet Take 1-2 tablets by mouth Every 4 (Four) Hours As Needed for severe pain.     No current facility-administered medications on file prior to visit.          HISTORY OF PRESENT ILLNESS     Patient was seen at the [facility] [place] last Thursday for persistent cough.  Chest x-ray showed possible left lower lobe retrocardiac pneumonia.  He was started on cefdinir and azithromycin.  Patient reports greater than 3 weeks history of persistent cough which has become more productive of  yellowish phlegm.  He was experiencing mild shortness of breath and persistent coughing symptoms.  He reports history of allergies and childhood asthma.        REVIEW OF SYSTEMS     Patient denies objective fever or chills but he has been experiencing some sweats  He denies chest pain, PND/orthopnea or increased heart palpitations.  Patient complains of persistent productive cough, yellowish phlegm without hemoptysis  Patient complains of mild dyspnea with coughing spells  He reports previous history of COVID-19 infection in December and was treated with monoclonal antibody infusion        PHYSICAL EXAMINATION     Physical Exam  Looks mildly sickly with frequent coughing spells; not toxic  Alert with intact thought and judgment  Cardiovascular: Normal rate, regular rhythm.   Lungs without rales or wheezing; not tachypnea; no stridor       REVIEWED DATA     Labs:     Office Visit on 04/05/2022   Component Date Value Ref Range Status   • SARS Antigen 04/05/2022 Not Detected  Not Detected Final   • Influenza A Antigen SOFYA 04/05/2022 Not Detected   Final   • Influenza B Antigen SOFYA 04/05/2022 Not Detected   Final   • Internal Control 04/05/2022 Passed  Passed Final   • Lot Number 04/05/2022 [id number]   Final   • Expiration Date 04/05/2022 11/11/22   Final      Lab Results   Component Value Date     01/04/2022    K 3.9 01/04/2022    CALCIUM 9.7 01/04/2022    AST 22 12/16/2017    ALT 23 12/16/2017    BUN 15 01/04/2022    CREATININE 0.87 01/04/2022    CREATININE 1.04 09/14/2021    CREATININE 1.14 12/16/2017    EGFRIFNONA 103 01/04/2022       Lab Results   Component Value Date    HGBA1C 4.95 03/12/2018       No results found for: LDL, HDL, TRIG    Lab Results   Component Value Date    TSH 2.720 12/27/2020    TSH 2.520 03/12/2018    TSH 1.640 12/16/2017    FREET4 0.94 05/11/2021    FREET4 1.09 03/12/2018    FREET4 1.15 12/16/2017       Lab Results   Component Value Date    WBC 7.21 01/04/2022    HGB 16.7 01/04/2022      01/04/2022       No results found for: MICROALBUR        Imaging:     EXAMINATION: XR CHEST 2VW     DATE: 03/31/2022     HISTORY: 2-month history of cough and intermittent sputum production.         COMPARISON: June 12, 2021.     FINDINGS: Two views of the chest demonstrate increased opacity in the left lower lobe. The right lung is clear. There is no pleural effusion or pneumothorax. The cardiac and mediastinal contours are normal.         IMPRESSION:     1. Increased opacity in the retrocardiac region of the left lower lobe which could represent atelectasis or pneumonia.         Medical Tests:           Summary of old records / correspondence / consultant report:           Request outside records:             *Examiner was wearing KN95 mask and eye protection during the entire duration of the visit. Patient was masked the entire time. Minimum social distance of 6 ft maintained entire visit except if physical contact was necessary as documented.       Template created by Maged Golden MD

## 2022-04-07 DIAGNOSIS — J18.9 PNEUMONIA OF LEFT LOWER LOBE DUE TO INFECTIOUS ORGANISM: Primary | ICD-10-CM

## 2022-04-07 RX ORDER — TIOTROPIUM BROMIDE INHALATION SPRAY 3.12 UG/1
2 SPRAY, METERED RESPIRATORY (INHALATION) DAILY
Qty: 1 G | Refills: 0 | Status: SHIPPED | OUTPATIENT
Start: 2022-04-07 | End: 2023-03-22

## 2022-04-08 ENCOUNTER — TREATMENT (OUTPATIENT)
Dept: PHYSICAL THERAPY | Facility: CLINIC | Age: 32
End: 2022-04-08

## 2022-04-08 DIAGNOSIS — R29.898 WEAKNESS OF RIGHT LOWER EXTREMITY: ICD-10-CM

## 2022-04-08 DIAGNOSIS — M25.561 ACUTE PAIN OF RIGHT KNEE: Primary | ICD-10-CM

## 2022-04-08 DIAGNOSIS — Z98.890 S/P ACL RECONSTRUCTION: ICD-10-CM

## 2022-04-08 DIAGNOSIS — R26.9 GAIT DISTURBANCE: ICD-10-CM

## 2022-04-08 PROCEDURE — 97530 THERAPEUTIC ACTIVITIES: CPT | Performed by: PHYSICAL THERAPIST

## 2022-04-08 PROCEDURE — 97110 THERAPEUTIC EXERCISES: CPT | Performed by: PHYSICAL THERAPIST

## 2022-04-08 NOTE — PROGRESS NOTES
30-Day / 10-Visit Progress Note         Patient: Shaheen Rivera   : 1990  Diagnosis/ICD-10 Code:  Acute pain of right knee [M25.561]  Referring practitioner: Marta Lal MD  Date of Initial Visit: Type: THERAPY  Noted: 2022  Today's Date: 2022  Patient seen for 20 sessions      Subjective:     Clinical Progress: improved  Home Program Compliance: Yes  Treatment has included:  therapeutic exercise, manual therapy, therapeutic activity, neuro-muscular retraining , gait training, electrical stimulation  and patient education with home exercise program     Subjective Pt has been dealing with pneumonia over the last couple weeks. He is no longer using his crutch and has unlocked his brace for gait.   Objective          Active Range of Motion     Right Knee   Flexion: 124 degrees   Extension: 0 degrees   Extensor lag: 3 degrees     Strength/Myotome Testing     Right Knee   Flexion: 4+  Extension: 4-  Quadriceps contraction: fair      Exercise:  -NuStep, level 7 x10 min (on his own)  -Retro walking on the TM working on quad contraction, 8 min at 0.6 to 0.8 MPH  -Bettie leg press (from 90 deg to 5 deg), 1x20 B, 130 lb, 2x 20 150lb  working on control   defer  -Matrix hamstring curl, single leg, 15lb 4x10  defer  -quad set, 10x 10 sec  -mini squats with hold, 1x15    defer  -TKE with green band, 2x10 holding 5 sec    -SLR x10  -SL hip abd 2x10  -bridge with ball squeeze, x20  -hip abd/ER with blue band, x20  -gait without brace, with one episode of giving way of the knee, 2x20 feet (CGA from PT)  -hamstring and calf stretching 3x20 sec    Functional Outcome Score:   Knee Outcome Survey=90%     Assessment & Plan     Assessment    Assessment details: Shaheen Rivera has been seen for 20 physical therapy sessions for s/p R ACL reconstruction.  Treatment has included therapeutic exercise, manual therapy, therapeutic activity, neuro-muscular retraining , gait training, electrical stimulation  and  patient education with home exercise program . Progress to physical therapy goals is good. Shaheen has had some issues with getting his quad to engage properly, but it is slowly starting to improve. He has improved his ROM to 0-124 deg and is able to perform a SLR with only a 3-5 deg quad lag at the most. He is visually improving the tone of the VMO and gets a better contraction with CKC activities. We are continuing to use the hinged knee brace, but he is able to unlock it now with community ambulation.  He will benefit from continued skilled physical therapy to address remaining impairments and functional limitations.     Prognosis: good    Goals  Plan Goals: ST wks  1. Patient will be independent with education for symptom management, joint protection and strategies to minimize stress on affected tissues (PART MET)   2. Patient will be able to perform a SLR without a quad lag for improved control of his knee (ONGOING) progressing  3. Pt to improve R knee ROM to ext=0 deg and amwfqtg=727 deg for improved gait pattern (MET)   4. Pt to ambulate without an AD with no gait deficits (PART MET)      LT wks  1. Pt to improve R knee ROM to ext=2 deg of HE to ffpjlwd=145 deg for improved gait pattern (ONGOING)   2. Pt to improve score on Knee Outcome Survey from 24% to 90% for overall functional improvement (MET) improved to 90%, but in his knee brace  3. Patient will increase R knee strength to 5/5 to improve functional mobility (ONGOING)   4. Patient will negotiate stairs reciprocally without rail support (ONGOING)   5. Patient will demonstrate an independent HEP for core and knee strength and flexibility/ROM. (ONGOING)     Plan  Therapy options: will be seen for skilled therapy services  Frequency: 2x week  Duration in weeks: 12  Treatment plan discussed with: patient           Recommendations: Continue as planned  Timeframe: 3 months  Prognosis to achieve goals: good    PT Signature: Khushi Garcia, PT,  CDNT    License Number: [id number]    Electronically signed by Khushi Garcia, PT, 04/08/22, 9:49 AM EDT      Based upon review of the patient's progress and continued therapy plan, it is my medical opinion that Shaheen Rivera should continue physical therapy treatment at [facility]  [address] [place] [address] [address]   [place] [address]  [phone].    Signature: __________________________________  Marta Lal MD    Timed:  Manual Therapy:         mins  20680;  Therapeutic Exercise:    35     mins  92914;     Neuromuscular Soumya:    10    mins  48414;    Therapeutic Activity:          mins  07637;     Gait Training:           mins  04118;     Ultrasound:          mins  71617;    Iontophoresis         mins 81721;  Dry Needling                   mins 41413/[id number] (Self-pay)    Untimed:  Electrical Stimulation:         mins  25970 ( );  Traction:         mins  07905;     Timed Treatment:   45   mins   Total Treatment:     45   mins

## 2022-04-15 ENCOUNTER — TREATMENT (OUTPATIENT)
Dept: PHYSICAL THERAPY | Facility: CLINIC | Age: 32
End: 2022-04-15

## 2022-04-15 DIAGNOSIS — M25.561 ACUTE PAIN OF RIGHT KNEE: Primary | ICD-10-CM

## 2022-04-15 DIAGNOSIS — Z98.890 S/P ACL RECONSTRUCTION: ICD-10-CM

## 2022-04-15 DIAGNOSIS — R29.898 WEAKNESS OF RIGHT LOWER EXTREMITY: ICD-10-CM

## 2022-04-15 DIAGNOSIS — R26.9 GAIT DISTURBANCE: ICD-10-CM

## 2022-04-15 PROCEDURE — 97530 THERAPEUTIC ACTIVITIES: CPT | Performed by: PHYSICAL THERAPIST

## 2022-04-15 PROCEDURE — 97110 THERAPEUTIC EXERCISES: CPT | Performed by: PHYSICAL THERAPIST

## 2022-04-15 PROCEDURE — 97116 GAIT TRAINING THERAPY: CPT | Performed by: PHYSICAL THERAPIST

## 2022-04-15 NOTE — PROGRESS NOTES
Physical Therapy Daily Progress Note    Patient: Shaheen Rivera   : 1990  Diagnosis/ICD-10 Code:  Acute pain of right knee [M25.561]  Referring practitioner: Marta Lal MD  Date of Initial Visit: Type: THERAPY  Noted: 2022  Today's Date: 4/15/2022  Patient seen for 21 sessions           Subjective I haven't had any buckling of the knee, walking in the brace unlocked    Objective        Exercise:  -Retro walking on the TM working on quad contraction, 8 min at 0.6 to 0.8 MPH in brace  -walking on TM without brace working on good heel/tow gait, equal weight shift, 1.0 x6 min  -Bettie leg press (from 90 deg to 5 deg), 1x20 B, 130 lb, 2x 20 150lb  working on control     -Matrix hamstring curl, single leg, 15lb 4x10    -6 inch step ups (in brace) xx20  -4 inch step downs, x20  -quad set, 10x 10 sec  -mini squats with hold, 1x15    defer  -TKE with green band, 2x10 holding 5 sec    -SLR 2x10  -bridge with ball squeeze, x20  -hip abd/ER with blue band, x20   defer  -hamstring and calf stretching 3x20 sec  -add hp add and abd machines at the gym    Assessment/Plan  Due to his lag in his quad strength, Shaheen has had to be in the hinged knee brace for longer than expected. He still has a weak VMO, but it is improving. Today started working on walking on the TM without the brace to help facilitate a more normal gait pattern. He has mild pain without the brace, but mainly still uneasy in full WBing as he is afraid the knee will buckle. He has a slight trunk shift over the R LE in stance phase, decreased stance phase, and decreased step length.          Timed:    Manual Therapy:         mins  30293;  Therapeutic Exercise:    20     mins  91398;     Neuromuscular Soumya:        mins  52990;    Therapeutic Activity:     15     mins  50833;     Gait Training:      10     mins  29565;     Ultrasound:          mins  49174;    Electrical Stimulation:         mins  90731 ( );  Iontophoresis         mins  37948;  Aquatic Therapy         mins 79984;  Dry Needling                   mins 20560/ 20561 (Self-pay)    Untimed:  Electrical Stimulation:         mins  69298 ( );  Traction:         mins  61706;     Timed Treatment:   45   mins   Total Treatment:     45   mins    Khushi Garcia PT, CDNT  Physical Therapist    [place] License:[id number]

## 2022-04-18 ENCOUNTER — TREATMENT (OUTPATIENT)
Dept: PHYSICAL THERAPY | Facility: CLINIC | Age: 32
End: 2022-04-18

## 2022-04-18 DIAGNOSIS — R26.9 GAIT DISTURBANCE: ICD-10-CM

## 2022-04-18 DIAGNOSIS — R29.898 WEAKNESS OF RIGHT LOWER EXTREMITY: ICD-10-CM

## 2022-04-18 DIAGNOSIS — Z98.890 S/P ACL RECONSTRUCTION: ICD-10-CM

## 2022-04-18 DIAGNOSIS — M25.561 ACUTE PAIN OF RIGHT KNEE: Primary | ICD-10-CM

## 2022-04-18 PROCEDURE — 97116 GAIT TRAINING THERAPY: CPT | Performed by: PHYSICAL THERAPIST

## 2022-04-18 PROCEDURE — 97110 THERAPEUTIC EXERCISES: CPT | Performed by: PHYSICAL THERAPIST

## 2022-04-18 PROCEDURE — 97530 THERAPEUTIC ACTIVITIES: CPT | Performed by: PHYSICAL THERAPIST

## 2022-04-18 NOTE — PROGRESS NOTES
Physical Therapy Daily Progress Note    Patient: Shaheen Rivera   : 1990  Diagnosis/ICD-10 Code:  Acute pain of right knee [M25.561]  Referring practitioner: Marta Lal MD  Date of Initial Visit: Type: THERAPY  Noted: 2022  Today's Date: 2022  Patient seen for 22 sessions           Subjective did some things at the gym already today     Objective          Active Range of Motion     Right Knee   Flexion: 128 degrees         Exercise:  -walking on TM without brace working on good heel/toe gait, equal weight shift, 1.0 up to 1.6 mph x10 min  -flexion stretch on [place] platform, 5x 20 sec  -[place] leg press (from 90 deg to 5 deg), 1x20 B, 130 lb, 2x 20 150lb  working on control     -Matrix hamstring curl, single leg, 15lb 4x10    -6 inch step ups (in brace) x20  -4 inch step downs (in brace), x10  -quad set, 10x 10 sec towel roll under the heel  -mini squats with hold, 1x15    defer  -TKE with green band, 2x10 holding 5 sec    -hamstring and calf stretching 3x20 sec      Assessment/Plan  Working on gait on the TM without the brace. Had Shaheen put L hand on the TM to help reduce his trunk shift over the R LE in stance phase. As we increased the speed, his gait did improve. He is starting to get some more definition of his calf and quad muscles. He continues to work very hard outside of he therapy sessions.          Timed:    Manual Therapy:         mins  77737;  Therapeutic Exercise:    20     mins  06893;     Neuromuscular Soumya:        mins  99469;    Therapeutic Activity:    15      mins  91157;     Gait Training:      10     mins  60669;     Ultrasound:          mins  27684;    Electrical Stimulation:         mins  79474 ( );  Iontophoresis         mins 65634;  Aquatic Therapy         mins 78431;  Dry Needling                   mins /  (Self-pay)    Untimed:  Electrical Stimulation:         mins  59541 ( );  Traction:         mins  79336;     Timed Treatment:   45    mins   Total Treatment:     45   mins    Khushi Garcia PT, CDNT  Physical Therapist    [place] License:[id number]

## 2022-04-22 ENCOUNTER — TREATMENT (OUTPATIENT)
Dept: PHYSICAL THERAPY | Facility: CLINIC | Age: 32
End: 2022-04-22

## 2022-04-22 DIAGNOSIS — Z98.890 S/P ACL RECONSTRUCTION: ICD-10-CM

## 2022-04-22 DIAGNOSIS — R26.9 GAIT DISTURBANCE: ICD-10-CM

## 2022-04-22 DIAGNOSIS — R29.898 WEAKNESS OF RIGHT LOWER EXTREMITY: ICD-10-CM

## 2022-04-22 DIAGNOSIS — M25.561 ACUTE PAIN OF RIGHT KNEE: Primary | ICD-10-CM

## 2022-04-22 DIAGNOSIS — J18.9 PNEUMONIA OF LEFT LOWER LOBE DUE TO INFECTIOUS ORGANISM: ICD-10-CM

## 2022-04-22 PROCEDURE — 97530 THERAPEUTIC ACTIVITIES: CPT | Performed by: PHYSICAL THERAPIST

## 2022-04-22 PROCEDURE — 97110 THERAPEUTIC EXERCISES: CPT | Performed by: PHYSICAL THERAPIST

## 2022-04-22 PROCEDURE — 97116 GAIT TRAINING THERAPY: CPT | Performed by: PHYSICAL THERAPIST

## 2022-04-22 RX ORDER — ALBUTEROL SULFATE 90 UG/1
AEROSOL, METERED RESPIRATORY (INHALATION)
Qty: 8.5 G | Refills: 3 | Status: SHIPPED | OUTPATIENT
Start: 2022-04-22

## 2022-04-22 NOTE — PROGRESS NOTES
Physical Therapy Daily Progress Note    Patient: Shaheen Rivera   : 1990  Diagnosis/ICD-10 Code:  Acute pain of right knee [M25.561]  Referring practitioner: Marta Lal MD  Date of Initial Visit: Type: THERAPY  Noted: 2022  Today's Date: 2022  Patient seen for 23 sessions           Subjective still having some medial pain at times    Objective          Active Range of Motion     Right Knee   Flexion: 128 degrees   Extension: 1 (of HE) degrees           Exercise:  -walking on TM without brace working on good heel/toe gait, equal weight shift, 1.0 up to 1.6 mph x8 min  -flexion stretch on [place] platform, 5x 20 sec  -Bettie leg press (from 90 deg to 5 deg), 1x20 B, 130 lb, 2x 20 150lb  working on control     -Matrix hamstring curl, single leg, 15lb 4x10    -Matrix hip abd, 4x10, 40lb, 2x10 at 50lb, 60lb x10  -side stepping and Monster walks (fwd and back), 2x20 feet each with green band at ankles (given for home)  -6 inch step ups (in brace) x20  defer  -4 inch step downs (in brace), x10  defer  -quad set, 10x 10 sec towel roll under the heel   -hamstring and calf stretching 3x20 sec    Assessment/Plan  Shaheen is slowly progressing his R quad strength. He is still wearing the hinged knee brace, but it is no longer locked out. He is cycling 20 min a day on his [place] at home, using the low impact course. He would like to start standing or hovering with a harder class, and have given him the ok to do so to help with his quad strengthening. Working on gait on the TM without the brace to facilitate a more normal gait pattern.          Timed:    Manual Therapy:         mins  55841;  Therapeutic Exercise:    23     mins  79625;     Neuromuscular Soumya:        mins  45351;    Therapeutic Activity:     10     mins  23051;     Gait Training:      10     mins  36858;     Ultrasound:          mins  67402;    Electrical Stimulation:         mins  81524 ( );  Iontophoresis         mins  09469;  Aquatic Therapy         mins 46575;  Dry Needling                   mins 20560/ 20561 (Self-pay)    Untimed:  Electrical Stimulation:         mins  17381 ( );  Traction:         mins  99745;     Timed Treatment:   43   mins   Total Treatment:     43   mins    Khushi Garcia PT, CDNT  Physical Therapist    [place] License:[id number]

## 2022-04-25 ENCOUNTER — TREATMENT (OUTPATIENT)
Dept: PHYSICAL THERAPY | Facility: CLINIC | Age: 32
End: 2022-04-25

## 2022-04-25 ENCOUNTER — OFFICE VISIT (OUTPATIENT)
Dept: ORTHOPEDIC SURGERY | Facility: CLINIC | Age: 32
End: 2022-04-25

## 2022-04-25 VITALS — HEIGHT: 72 IN | TEMPERATURE: 97.5 F | WEIGHT: 250.1 LBS | BODY MASS INDEX: 33.87 KG/M2

## 2022-04-25 DIAGNOSIS — R26.9 GAIT DISTURBANCE: ICD-10-CM

## 2022-04-25 DIAGNOSIS — Z98.890 S/P ACL RECONSTRUCTION: Primary | ICD-10-CM

## 2022-04-25 DIAGNOSIS — Z98.890 S/P ACL RECONSTRUCTION: ICD-10-CM

## 2022-04-25 DIAGNOSIS — M25.561 ACUTE PAIN OF RIGHT KNEE: Primary | ICD-10-CM

## 2022-04-25 DIAGNOSIS — R29.898 WEAKNESS OF RIGHT LOWER EXTREMITY: ICD-10-CM

## 2022-04-25 PROCEDURE — 97110 THERAPEUTIC EXERCISES: CPT | Performed by: PHYSICAL THERAPIST

## 2022-04-25 PROCEDURE — 97116 GAIT TRAINING THERAPY: CPT | Performed by: PHYSICAL THERAPIST

## 2022-04-25 PROCEDURE — 97530 THERAPEUTIC ACTIVITIES: CPT | Performed by: PHYSICAL THERAPIST

## 2022-04-25 PROCEDURE — 99212 OFFICE O/P EST SF 10 MIN: CPT | Performed by: ORTHOPAEDIC SURGERY

## 2022-04-25 NOTE — PROGRESS NOTES
ACL follow Up     Patient: Shaheen Rivera        YOB: 1990      Chief Complaints: knee pain right    History of Present Illness: Pt is here f/u knee arthroscopy, ACL reconstruction on the right he feels like he is finally getting over the hump his quads are finally waking up he feels better still little odds and ends pains but overall improving        Allergies: No Known Allergies    Medications:   Home Medications:  Current Outpatient Medications on File Prior to Visit   Medication Sig   • acyclovir (ZOVIRAX) 200 MG capsule Take 1 capsule by mouth 2 (Two) Times a Day.   • albuterol sulfate  (90 Base) MCG/ACT inhaler Inhale 2 puffs orally every 4 hours as needed for wheezing or shortness of air.   • anastrozole (ARIMIDEX) 1 MG tablet Take 1 mg by mouth Daily.   • apixaban (ELIQUIS) 5 MG tablet tablet Take 5 mg by mouth 2 (two) times a day. HOLD PER MD INSTR-48 HOURS PRIOR-   • carvedilol (COREG) 12.5 MG tablet Take 1.5 tablets by mouth 2 (Two) Times a Day.   • cephalexin (KEFLEX) 500 MG capsule Take 1 capsule by mouth Every 12 (Twelve) Hours.   • cetirizine (zyrTEC) 10 MG tablet Take 1 tablet by mouth Every Night.   • Entresto 24-26 MG tablet Take  by mouth 2 (Two) Times a Day.   • fluticasone (FLONASE) 50 MCG/ACT nasal spray Administer 1spray in each nostril twice daily.   • furosemide (LASIX) 40 MG tablet 40 mg 2 (Two) Times a Day.   • spironolactone (ALDACTONE) 25 MG tablet Take 25 mg by mouth Daily.   • Testosterone Cypionate (DEPOTESTOTERONE CYPIONATE) 200 MG/ML injection Inject 1 mL into the shoulder, thigh, or buttocks Every 7 (Seven) Days.   • tiotropium bromide monohydrate (Spiriva Respimat) 2.5 MCG/ACT aerosol solution inhaler Inhale 2 puffs Daily for 30 days.     No current facility-administered medications on file prior to visit.     Current Medications:  Scheduled Meds:  Continuous Infusions:No current facility-administered medications for this visit.    PRN  Meds:.          Physical Exam: 31 y.o. male  General Appearance:    Alert, cooperative, in no acute distress                 There were no vitals filed for this visit.   Patient is alert and oriented ×3 no acute distress normal mood physical exam.  Physical exam of the knee, incisions looked good there is no erythema, calf is soft and non-tender.  No sign or sx of DVT. Full ROM, Neg Lachman, Good SLR and quad control  Knee looks good he is achieving full extension where he gets hyperextension on the left stabling medicine exam quads are improving    Assessment  S/P knee scope. ACL reconstruction, overall doing well.  Wanting to continue to work on his quads  Plan: Continue PT, work on quads,  Follow up in 8 weeks.

## 2022-04-25 NOTE — PROGRESS NOTES
Physical Therapy Daily Progress Note    Patient: Shaheen Rivera   : 1990  Diagnosis/ICD-10 Code:  Acute pain of right knee [M25.561]  Referring practitioner: Marta Lal MD  Date of Initial Visit: Type: THERAPY  Noted: 2022  Today's Date: 2022  Patient seen for 24 sessions           Subjective some pain at times, but not often    Objective          Active Range of Motion     Right Knee   Flexion: 128 degrees   Extension: 1 (of HE) degrees     Strength/Myotome Testing     Right Knee   Flexion: 4+  Extension: 4        Exercise:  -walking on TM without brace working on good heel/toe gait, equal weight shift, 1.0 up to 1.6 mph x8 min  -flexion stretch on [place] platform, 5x 20 sec  -[place] leg press (from 90 deg to 5 deg), 1x20 B, 130 lb, 2x 20 150lb  working on control     -Matrix hamstring curl, single leg, 15lb 4x10    -Matrix hip abd, 4x10, 40lb, 2x10 at 50lb, 60lb x10  -small ROM lunge on R LE, x20  -side stepping and Monster walks (fwd and back), 2x20 feet each with green band at ankles   -6 inch step ups (in brace) x20    -6 inch step downs (in brace), x20    -wall squat with ball behind back, 10x 5 sec hold  -quad set, 10x 10 sec towel roll under the heel   -hamstring and calf stretching 3x20 sec    Assessment/Plan  Shaheen is slowly gaining his strength in his R quad. He has had no reports of buckling, and only occasional pain. He has been using his [place] bike for 20-25 min a day, and going to the gym on non-therapy days to continue to work on his strength. He is exhibiting betting control on a 6 inch step down and his gait pattern on the TM without the brace is improved as well.          Timed:    Manual Therapy:         mins  89052;  Therapeutic Exercise:    23     mins  64879;     Neuromuscular Soumya:        mins  76154;    Therapeutic Activity:     12     mins  77547;     Gait Trainin     mins  57095;     Ultrasound:          mins  37557;    Electrical Stimulation:          mins  18187 ( );  Iontophoresis         mins 00780;  Aquatic Therapy         mins 32294;  Dry Needling                   mins 20560/ 20561 (Self-pay)    Untimed:  Electrical Stimulation:         mins  45720 ( );  Traction:         mins  96524;     Timed Treatment:   43   mins   Total Treatment:     43   mins    Khushi Garcia PT, CDNT  Physical Therapist    [place] License:[id number]

## 2022-04-29 ENCOUNTER — TREATMENT (OUTPATIENT)
Dept: PHYSICAL THERAPY | Facility: CLINIC | Age: 32
End: 2022-04-29

## 2022-04-29 DIAGNOSIS — R29.898 WEAKNESS OF RIGHT LOWER EXTREMITY: ICD-10-CM

## 2022-04-29 DIAGNOSIS — R26.9 GAIT DISTURBANCE: ICD-10-CM

## 2022-04-29 DIAGNOSIS — M25.561 ACUTE PAIN OF RIGHT KNEE: Primary | ICD-10-CM

## 2022-04-29 DIAGNOSIS — Z98.890 S/P ACL RECONSTRUCTION: ICD-10-CM

## 2022-04-29 PROCEDURE — 97110 THERAPEUTIC EXERCISES: CPT | Performed by: PHYSICAL THERAPIST

## 2022-04-29 PROCEDURE — 97530 THERAPEUTIC ACTIVITIES: CPT | Performed by: PHYSICAL THERAPIST

## 2022-04-29 NOTE — PROGRESS NOTES
Physical Therapy Daily Progress Note    Patient: Shaheen Rivera   : 1990  Diagnosis/ICD-10 Code:  Acute pain of right knee [M25.561]  Referring practitioner: Marta Lal MD  Date of Initial Visit: Type: THERAPY  Noted: 2022  Today's Date: 2022  Patient seen for 25 sessions           Subjective I have to leave early today, have another appt    Objective   Exercise:  -walking on TM without brace working on good heel/toe gait, equal weight shift, 1.0 up to 1.6 mph x6 min  -flexion stretch on [place] platform, 5x 20 sec  -Bettie leg press (from 90 deg to 5 deg), 1x20 B, 130 lb, 2x 20 150lb  working on control     -Matrix hamstring curl, single leg, 15lb 4x10    -Matrix hip abd, 4x10, 40lb, 2x10 at 50lb, 60lb x10  -small ROM lunge on R LE, x20  def3er  -side stepping and Monster walks (fwd and back), 2x20 feet   each with green band at ankles defer  -6 inch step ups fwd and lateral  x20    -6 inch step downs , x20    -wall squat with ball behind back, 10x 5 sec hold defer  -quad set, 10x 10 sec towel roll under the heel   defer  -hamstring and calf stretching 3x20 sec      Assessment/Plan  Shaheen has made good progress over the last two weeks. He RTMD this past week and is not able to get out of the brace at home and some in the community, just wearing it in crowds or on uneven surfaces. His quad control on steps is improving as well as his gait.          Timed:    Manual Therapy:         mins  87964;  Therapeutic Exercise:    25     mins  69160;     Neuromuscular Soumya:        mins  27145;    Therapeutic Activity:     8     mins  59296;     Gait Training:           mins  54713;     Ultrasound:          mins  53187;    Electrical Stimulation:         mins  31660 ( );  Iontophoresis         mins 02584;  Aquatic Therapy         mins 97419;  Dry Needling                   mins /  (Self-pay)    Untimed:  Electrical Stimulation:         mins  72718 ( );  Traction:         mins   88457;     Timed Treatment:   33   mins   Total Treatment:     33   mins    Khushi Garcia PT, CDNT  Physical Therapist    [place] License:[id number]

## 2022-05-10 ENCOUNTER — TREATMENT (OUTPATIENT)
Dept: PHYSICAL THERAPY | Facility: CLINIC | Age: 32
End: 2022-05-10

## 2022-05-10 ENCOUNTER — TELEPHONE (OUTPATIENT)
Dept: ORTHOPEDIC SURGERY | Facility: CLINIC | Age: 32
End: 2022-05-10

## 2022-05-10 DIAGNOSIS — Z98.890 S/P ACL RECONSTRUCTION: ICD-10-CM

## 2022-05-10 DIAGNOSIS — M25.561 ACUTE PAIN OF RIGHT KNEE: Primary | ICD-10-CM

## 2022-05-10 DIAGNOSIS — R29.898 WEAKNESS OF RIGHT LOWER EXTREMITY: ICD-10-CM

## 2022-05-10 DIAGNOSIS — R26.9 GAIT DISTURBANCE: ICD-10-CM

## 2022-05-10 PROCEDURE — 97110 THERAPEUTIC EXERCISES: CPT | Performed by: PHYSICAL THERAPIST

## 2022-05-10 PROCEDURE — 97112 NEUROMUSCULAR REEDUCATION: CPT | Performed by: PHYSICAL THERAPIST

## 2022-05-10 PROCEDURE — 97530 THERAPEUTIC ACTIVITIES: CPT | Performed by: PHYSICAL THERAPIST

## 2022-05-10 NOTE — TELEPHONE ENCOUNTER
Caller: ALFREDO Hooker call back number: [phone]    What form or medical record are you requesting: THE DICTATION FROM 4/25/22    Who is requesting this form or medical record from you: W/C     How would you like to receive the form or medical records (pick-up, mail, fax):   If fax, what is the fax number: [phone] CLAIM NUMBER [id number]

## 2022-05-10 NOTE — PROGRESS NOTES
Physical Therapy Daily Progress Note    Patient: Shaheen Rivera   : 1990  Diagnosis/ICD-10 Code:  Acute pain of right knee [M25.561]  Referring practitioner: Marta Lal MD  Date of Initial Visit: Type: THERAPY  Noted: 2022  Today's Date: 5/10/2022  Patient seen for 26 sessions           Subjective I have been on the [place] pretty much daily. I am clipping in ok, but using the other foot to help clip out since it is such a torque on the leg    Objective       Exercise:  -walking on TM without brace working on good heel/toe gait, equal weight shift, 1.0 up to 1.6 mph x6 min  -flexion stretch on Bettie platform, 5x 20 sec  -Bettie leg press (from 90 deg to 5 deg), 1x20 B, 130 lb, 2x 20 150lb  working on control     -Matrix hamstring curl, single leg, 20lb 4x10    -Matrix hip abd, 4x10, 40lb, 2x10 at 50lb, 60lb x10  -small ROM lunge on R LE, x20  def3er  -side stepping and Monster walks (fwd and back), 2x20 feet   each with green band at ankles defer  -4 inch step ups fwd and lateral  x20    -4 inch step downs , x20 (focusing on slow, controlled descent)  -wall squat with ball behind back, 10x 5 sec hold defer  -isometric knee ext at 90 deg and 60 deg, resisting therapist 5x 10 sec each, tactile cues for VMO  -hamstring and calf stretching 3x20 sec    Assessment/Plan  Shaheen still has mild antalgia with his gait without the brace, decreased stance time and weight shift on the R LE. He has had a couple instances of buckling while on the TM, but they were small and he was able to correct. He is still wearing the brace out in the community.          Timed:    Manual Therapy:         mins  18555;  Therapeutic Exercise:    23     mins  17679;     Neuromuscular Soumya:    10    mins  22842;    Therapeutic Activity:     12     mins  93310;     Gait Training:           mins  04478;     Ultrasound:          mins  22756;    Electrical Stimulation:         mins  67639 ( );  Iontophoresis         mins  82954;  Aquatic Therapy         mins 21522;  Dry Needling                   mins 20560/ 20561 (Self-pay)    Untimed:  Electrical Stimulation:         mins  92396 ( );  Traction:         mins  50698;     Timed Treatment:   45   mins   Total Treatment:     45   mins    Khushi Garcia PT, CDNT  Physical Therapist    [place] License:[id number]

## 2022-05-13 ENCOUNTER — TREATMENT (OUTPATIENT)
Dept: PHYSICAL THERAPY | Facility: CLINIC | Age: 32
End: 2022-05-13

## 2022-05-13 DIAGNOSIS — M25.561 ACUTE PAIN OF RIGHT KNEE: Primary | ICD-10-CM

## 2022-05-13 DIAGNOSIS — R29.898 WEAKNESS OF RIGHT LOWER EXTREMITY: ICD-10-CM

## 2022-05-13 DIAGNOSIS — Z98.890 S/P ACL RECONSTRUCTION: ICD-10-CM

## 2022-05-13 DIAGNOSIS — R26.9 GAIT DISTURBANCE: ICD-10-CM

## 2022-05-13 PROCEDURE — 97112 NEUROMUSCULAR REEDUCATION: CPT | Performed by: PHYSICAL THERAPIST

## 2022-05-13 PROCEDURE — 97110 THERAPEUTIC EXERCISES: CPT | Performed by: PHYSICAL THERAPIST

## 2022-05-13 NOTE — PROGRESS NOTES
Re-Assessment / Re-Certification        Patient: Shaheen Rivera   : 1990  Diagnosis/ICD-10 Code:  Acute pain of right knee [M25.561]  Referring practitioner: Marta Lal MD  Date of Initial Visit: Type: THERAPY  Noted: 2022  Today's Date: 2022  Patient seen for 27 sessions      Subjective:     Clinical Progress: improved  Home Program Compliance: Yes  Treatment has included:  therapeutic exercise, manual therapy, therapeutic activity, neuro-muscular retraining , gait training, electrical stimulation  and patient education with home exercise program     Subjective wearing the brace a little less.   Objective          Active Range of Motion     Right Knee   Flexion: 127 degrees   Extension: 1 (of HE) degrees     Strength/Myotome Testing     Right Hip   Planes of Motion   Flexion: 4+  Abduction: 4+  Adduction: 5  External rotation: 4+    Right Knee   Flexion: 4+  Extension: 4  Quadriceps contraction: fair (for VMO)        Exercise:  -quad set 20x 5 sec hold  -SLR 3x10, holding quad set  -SL hip abd, x20  -TKE, blue band, 20x 5 sec (some tactile cues for quad contraction)  -heel slides with strap, 10x 10 sec  -4 inch step ups fwd and lateral  x20    -4 inch step downs , x20 (focusing on slow, controlled descent)  -isometric knee ext at 90 deg and 60 deg, resisting therapist 5x 10 sec each, tactile cues for VMO  -hamstring and calf stretching 3x20 sec    Discussion of custom orthotics, having him set up with Mukund Alfaro PT to get them molded.     Functional outcome score:   Knee Outcome Survey=61.25%      Assessment & Plan     Assessment    Assessment details: Shaheen Rivera has been seen for 27 physical therapy sessions for s/p R ACL reconstruction.  Treatment has included therapeutic exercise, manual therapy, therapeutic activity, neuro-muscular retraining , gait training, electrical stimulation  and patient education with home exercise program . Progress to physical therapy goals is good.  Shaheen's VMO is starting to come back and get stronger. He is starting to walk more without the brace, unless in a crowd or for longer distances. His ROM is 1 deg of HE to 127 deg and strength is 4 to 4+/5. He is able to ascend stairs with a reciprocal pattern, but not descending yet due to quad weakness. He is very compliant with his HEP, using his Peloton bike almost daily and working out at the gym on non-therapy days.  He will benefit from continued skilled physical therapy to address remaining impairments and functional limitations.     Prognosis: good    Goals  Plan Goals: ST wks  1. Patient will be independent with education for symptom management, joint protection and strategies to minimize stress on affected tissues (MET)   2. Patient will be able to perform a SLR without a quad lag for improved control of his knee (MET)   3. Pt to improve R knee ROM to ext=0 deg and rwxtmro=552 deg for improved gait pattern (MET)   4. Pt to ambulate without an AD with no gait deficits (PART MET)  Still with mild antalgia    LT wks  1. Pt to improve R knee ROM to ext=2 deg of HE to armlqlc=850 deg for improved gait pattern (ONGOING) (PART MET)   2. Pt to improve score on Knee Outcome Survey from 24% to 90% for overall functional improvement (ONGOING) improved to 61.25% now almost weaned out of the brace  3. Patient will increase R knee strength to 5/5 to improve functional mobility (ONGOING)   4. Patient will negotiate stairs reciprocally without rail support (ONGOING) (PART MET) for ascending  5. Patient will demonstrate an independent HEP for core and knee strength and flexibility/ROM. (ONGOING)     Plan  Therapy options: will be seen for skilled therapy services  Frequency: 2x week  Duration in weeks: 8  Treatment plan discussed with: patient           Recommendations: Continue as planned  Timeframe: 2 months  Prognosis to achieve goals: good    PT Signature: Khushi Garcia PT, CDNT  License:  [id number]      Based upon review of the patient's progress and continued therapy plan, it is my medical opinion that Shaheen Rivera should continue physical therapy treatment at [facility]  [address] [place] [address] [address] [place] [address]  [phone].    Signature: __________________________________  Marta Lal MD  BHAMBPTSIG    Timed:  Manual Therapy:         mins  45796;  Therapeutic Exercise:    30     mins  28650;     Neuromuscular Soumya:    12    mins  68308;    Therapeutic Activity:          mins  46309;     Gait Training:           mins  25206;     Ultrasound:          mins  64284;    Electrical Stimulation:         mins  24349 ( );  Iontophoresis         mins 27185;  Dry Needling                   mins 04202/55358 (Self-pay)    Untimed:  Electrical Stimulation:         mins  03091 ( );  Mechanical Traction:         mins  79199;     Timed Treatment:   42   mins   Total Treatment:     42   mins

## 2022-05-18 ENCOUNTER — TREATMENT (OUTPATIENT)
Dept: PHYSICAL THERAPY | Facility: CLINIC | Age: 32
End: 2022-05-18

## 2022-05-18 DIAGNOSIS — Z98.890 S/P ACL RECONSTRUCTION: ICD-10-CM

## 2022-05-18 DIAGNOSIS — R26.9 GAIT DISTURBANCE: ICD-10-CM

## 2022-05-18 DIAGNOSIS — M25.561 ACUTE PAIN OF RIGHT KNEE: Primary | ICD-10-CM

## 2022-05-18 DIAGNOSIS — R29.898 WEAKNESS OF RIGHT LOWER EXTREMITY: ICD-10-CM

## 2022-05-18 PROCEDURE — 97112 NEUROMUSCULAR REEDUCATION: CPT | Performed by: PHYSICAL THERAPIST

## 2022-05-18 PROCEDURE — 97530 THERAPEUTIC ACTIVITIES: CPT | Performed by: PHYSICAL THERAPIST

## 2022-05-18 PROCEDURE — 97110 THERAPEUTIC EXERCISES: CPT | Performed by: PHYSICAL THERAPIST

## 2022-05-18 NOTE — PROGRESS NOTES
Physical Therapy Daily Progress Note    Patient: Shaheen Rivera   : 1990  Diagnosis/ICD-10 Code:  Acute pain of right knee [M25.561]  Referring practitioner: Marta Lal MD  Date of Initial Visit: Type: THERAPY  Noted: 2022  Today's Date: 2022  Patient seen for 28 sessions           Subjective still feels weak, most of the time walking is fine, but occasional giving way    Objective     Exercise:  -single leg press, 160lb 4x20, focusing on slow motion, pushing through the heel to get a greater contraction of the quad  -matrix hamstring curl, single leg, 25lb 3x20  -matrix hip abd, 60lb, 2x20, 80lb x20  -matrix hip add, 70lb 2x20  -knee flexion stretch, foot on [place] hip platform, 4x20 sec  -TKE, blue band, 20x 5 sec (some tactile cues for quad contraction)  -6 inch step ups lateral  x20    -6 inch step downs , x20 (focusing on slow, controlled descent)  -wall squats (small ROM) w/ball, 10x 5 sec  -attempted very small SL squat, but was having a hard time sequencing so did some step down to heel touch with a 2 inch surface, x10 (verbal cues)  -hamstring and calf stretching 3x20 sec    Assessment/Plan  Working more in [facility] today for activation of his quad. He is able to step down a 6 inch step with single hand hold, but doesn't feel stable yet doing a small ROM SL squat (feels like it may buckle).          Timed:    Manual Therapy:         mins  66703;  Therapeutic Exercise:    23     mins  39601;     Neuromuscular Soumya:    10    mins  33725;    Therapeutic Activity:     10     mins  78006;     Gait Training:           mins  58099;     Ultrasound:          mins  64664;    Electrical Stimulation:         mins  02964 ( );  Iontophoresis         mins 39572;  Aquatic Therapy         mins 82646;  Dry Needling                   mins /  (Self-pay)    Untimed:  Electrical Stimulation:         mins  43760 ( );  Traction:         mins  00766;     Timed Treatment:   43   mins    Total Treatment:     43   mins    Khushi Garcia PT, CDNT  Physical Therapist    [place] License:[id number]

## 2022-05-20 ENCOUNTER — TREATMENT (OUTPATIENT)
Dept: PHYSICAL THERAPY | Facility: CLINIC | Age: 32
End: 2022-05-20

## 2022-05-20 DIAGNOSIS — R26.9 GAIT DISTURBANCE: ICD-10-CM

## 2022-05-20 DIAGNOSIS — R29.898 WEAKNESS OF RIGHT LOWER EXTREMITY: ICD-10-CM

## 2022-05-20 DIAGNOSIS — M25.561 ACUTE PAIN OF RIGHT KNEE: Primary | ICD-10-CM

## 2022-05-20 DIAGNOSIS — Z98.890 S/P ACL RECONSTRUCTION: ICD-10-CM

## 2022-05-20 PROCEDURE — 97530 THERAPEUTIC ACTIVITIES: CPT | Performed by: PHYSICAL THERAPIST

## 2022-05-20 PROCEDURE — 97110 THERAPEUTIC EXERCISES: CPT | Performed by: PHYSICAL THERAPIST

## 2022-05-20 PROCEDURE — 97112 NEUROMUSCULAR REEDUCATION: CPT | Performed by: PHYSICAL THERAPIST

## 2022-05-20 NOTE — PROGRESS NOTES
Physical Therapy Daily Progress Note    Patient: Shaheen Rivera   : 1990  Diagnosis/ICD-10 Code:  Acute pain of right knee [M25.561]  Referring practitioner: Marta Lal MD  Date of Initial Visit: Type: THERAPY  Noted: 2022  Today's Date: 2022  Patient seen for 29 sessions           Subjective tried standing on the Peloton last night. Able to do it, which helped to use my quad more, but not at a high speed.     Objective     Exercise:  -single leg press, 160lb 4x20, focusing on slow motion, pushing through the heel to get a greater contraction of the quad  -matrix hamstring curl, single leg, 25lb 3x20  -matrix hip abd, 60lb, 2x20, 80lb x20  -matrix hip add, 70lb 2x20    defer  -knee flexion stretch, foot on [place] hip platform, 4x20 sec  -TKE, blue band, 20x 5 sec (some tactile cues for quad contraction)  defer  -6 inch step ups fwd and lateral  x20    -6 inch step downs , x20 (verbal cues focusing on slow, controlled descent)  -wall squats (small ROM) w/ball, 10x 5 sec  -small SL squat, step down to heel touch with a 2 inch surface, x10 (verbal and tactile cues for not rotating the trunk)  -hamstring and calf stretching 3x20 sec    Assessment/Plan  Shaheen was able to do a step down to heel touch from a 2 inch step with better control and less compensation today. Last session it was giving him the feeling that it may buckle, less so today. Continuing to work on the quad strength, gait, and tolerance to [facility] activity.          Timed:    Manual Therapy:         mins  00677;  Therapeutic Exercise:    23     mins  68295;     Neuromuscular Soumya:    12    mins  89323;    Therapeutic Activity:     10     mins  74072;     Gait Training:           mins  85465;     Ultrasound:          mins  64517;    Electrical Stimulation:         mins  99285 ( );  Iontophoresis         mins 80465;  Aquatic Therapy         mins 75488;  Dry Needling                   mins /   (Self-pay)    Untimed:  Electrical Stimulation:         mins  96478 ( );  Traction:         mins  26143;     Timed Treatment:   45   mins   Total Treatment:     45   mins    Khushi Garcia PT, AIRAM  Physical Therapist    [place] License:[id number]

## 2022-05-23 ENCOUNTER — TREATMENT (OUTPATIENT)
Dept: PHYSICAL THERAPY | Facility: CLINIC | Age: 32
End: 2022-05-23

## 2022-05-23 DIAGNOSIS — R29.898 WEAKNESS OF RIGHT LOWER EXTREMITY: ICD-10-CM

## 2022-05-23 DIAGNOSIS — R26.9 GAIT DISTURBANCE: ICD-10-CM

## 2022-05-23 DIAGNOSIS — Z98.890 S/P ACL RECONSTRUCTION: ICD-10-CM

## 2022-05-23 DIAGNOSIS — M25.561 ACUTE PAIN OF RIGHT KNEE: Primary | ICD-10-CM

## 2022-05-23 PROCEDURE — 97530 THERAPEUTIC ACTIVITIES: CPT | Performed by: PHYSICAL THERAPIST

## 2022-05-23 PROCEDURE — 97112 NEUROMUSCULAR REEDUCATION: CPT | Performed by: PHYSICAL THERAPIST

## 2022-05-23 PROCEDURE — 97110 THERAPEUTIC EXERCISES: CPT | Performed by: PHYSICAL THERAPIST

## 2022-05-23 NOTE — PROGRESS NOTES
Physical Therapy Daily Progress Note    Patient: Shaheen Rivera   : 1990  Diagnosis/ICD-10 Code:  Acute pain of right knee [M25.561]  Referring practitioner: Marta Lal MD  Date of Initial Visit: Type: THERAPY  Noted: 2022  Today's Date: 2022  Patient seen for 30 sessions           Subjective was up on my leg a lot over the weekend. Still gets tired and causes me to limp    Objective       Exercise:  -TM for 5 min working on equal weight shift and stance time  -single leg press, 160lb 4x20, focusing on slow motion, pushing through the heel to get a greater contraction of the quad  -matrix hamstring curl, single leg, 25lb 3x20  -matrix hip abd, 60lb, 2x20, 80lb x20  -knee flexion stretch, foot on [place] hip platform, 4x20 sec  -TKE, blue band, 20x 5 sec (some tactile cues for quad contraction)    -6 inch step ups fwd and lateral  x20    -6 inch step downs , x20 (verbal cues focusing on slow, controlled descent)  -wall squats (small ROM) w/ball, 10x 5 sec  DEFER  -small SL squat, step down to heel touch with a 2 inch surface, x18 (verbal and tactile cues for not rotating the trunk)  -mini squat on rockerboard 15x 5 sec  -manual knee ext resistance at 90 and 60 deg, 5 x each, for 10 sec hold  -hamstring and calf stretching 3x20 sec    Assessment/Plan  Still with difficulty with eccentric control of the quad. He compensates going down stairs with a slight IR of the R hip and trunk rotation to help with stability. He has some buckling  at times, once on the TM this am.          Timed:    Manual Therapy:         mins  07722;  Therapeutic Exercise:    23     mins  45982;     Neuromuscular Soumya:    10    mins  01414;    Therapeutic Activity:     12     mins  54417;     Gait Training:           mins  71206;     Ultrasound:          mins  44508;    Electrical Stimulation:         mins  52865 ( );  Iontophoresis         mins 34792;  Aquatic Therapy         mins 50004;  Dry Needling                    mins 20560/ 20561 (Self-pay)    Untimed:  Electrical Stimulation:         mins  77205 ( );  Traction:         mins  94472;     Timed Treatment:  45    mins   Total Treatment:     45   mins    Khushi Garcia PT, CDNT  Physical Therapist    [place] License:[id number]

## 2022-05-24 ENCOUNTER — OFFICE VISIT (OUTPATIENT)
Dept: PHYSICAL THERAPY | Facility: CLINIC | Age: 32
End: 2022-05-24

## 2022-05-24 DIAGNOSIS — M21.41 PES PLANUS OF BOTH FEET: Primary | ICD-10-CM

## 2022-05-24 DIAGNOSIS — R53.1 STRENGTH LOSS OF: ICD-10-CM

## 2022-05-24 DIAGNOSIS — M21.42 PES PLANUS OF BOTH FEET: Primary | ICD-10-CM

## 2022-05-24 PROCEDURE — 97760 ORTHOTIC MGMT&TRAING 1ST ENC: CPT | Performed by: PHYSICAL THERAPIST

## 2022-05-24 PROCEDURE — 97162 PT EVAL MOD COMPLEX 30 MIN: CPT | Performed by: PHYSICAL THERAPIST

## 2022-05-24 NOTE — PROGRESS NOTES
Physical Therapy Initial Evaluation Note    Patient: Shaheen Rivera   : 1990  Diagnosis/ICD-10 Code: Pes planus, B   Referring practitioner: Marta Lal MD  Date of Initial Visit: Type: THERAPY  Noted: 2022  Today's Date: 2022  Patient seen for 1 sessions           Subjective: Shaheen reported that he is interested in orthotics to diminish the drop of his arches in weight bearing activities.       Objective     Observation: B pes planus, R greater than L.  This is accentuated during the gait cycle.   Forefoot flexiblity: B 40 degrees of supination, passively.    Treatment  1. Orthotic fit/train:  Impressions take for custom made orthotics.    2. Seated towel scrunches, x 20, B  3. Seated arch raises x 20, B  4. Patient education:  Seated towel scrunches and seated arch raises.     Assessment & Plan     Assessment  Impairments: abnormal gait  Other impairment: pes planus B, loss of intrinsic muscle control of the feet  Functional Limitations: walking  Assessment details: Shaheen Rivera is a 31 y.o. year-old male referred to physical therapy for pes planus B. He presents with an evolving clinical presentation.  He has comorbidities to include soft/bony tissue adaptations to pes planus B.  He has no known personal factors  that may affect his progress in the plan of care.  Signs and symptoms are consistent with physical therapy diagnosis of pes planus B, loss of strength of intrinsic muscles of the feet.    Prognosis: good  Prognosis details: Shaheen has a flexible forefoot that should enable his kinetic chain to adapt well over time to the new position his forefoot is placed by the orthotic in weight bearing.     Goals  Plan Goals: STGs to be met in 4 weeks  1. The orthotics arrive and the break in process begins.  2. The runner's stretch, with the orthotics in his shoes, is added to his HEP.    LTGs to be met in 12 weeks  1. Shaheen is wearing the custom made orthotics for all ADLs.   2. He  is independent with a HEP and self/orthotics care.     Plan  Therapy options: will be seen for skilled therapy services  Planned therapy interventions: neuromuscular re-education, orthotic fitting/training, strengthening, stretching, therapeutic activities, home exercise program, functional ROM exercises, flexibility and body mechanics training  Frequency: 1x month  Duration in weeks: 4  Treatment plan discussed with: patient  Plan details: Shaheen will be seen again once the orthotics arrive and the break in process will begin.                Timed:    Manual Therapy:         mins  26323;  Therapeutic Exercise:    5     mins  77680;     Neuromuscular Soumya:        mins  14001;    Therapeutic Activity:          mins  58236;     Gait Training:           mins  87087;     Ultrasound:          mins  14499;    Electrical Stimulation:         mins  01934 ( );  Iontophoresis         mins 99505;  Aquatic Therapy         mins 99225;  Dry Needling                   mins 57298/ 79130 (Self-pay)  Orthotic fit/train           15 mins 04494  Untimed:  Electrical Stimulation:         mins  35376 (MC );  Traction:         mins  96485;     Timed Treatment:   20   mins   Total Treatment:     40   mins    Mukund Alfaro PT  Physical Therapist    [place] License:[id number]

## 2022-06-01 ENCOUNTER — TREATMENT (OUTPATIENT)
Dept: PHYSICAL THERAPY | Facility: CLINIC | Age: 32
End: 2022-06-01

## 2022-06-01 DIAGNOSIS — R53.1 STRENGTH LOSS OF: ICD-10-CM

## 2022-06-01 DIAGNOSIS — M25.561 ACUTE PAIN OF RIGHT KNEE: ICD-10-CM

## 2022-06-01 DIAGNOSIS — Z98.890 S/P ACL RECONSTRUCTION: ICD-10-CM

## 2022-06-01 DIAGNOSIS — M21.42 PES PLANUS OF BOTH FEET: Primary | ICD-10-CM

## 2022-06-01 DIAGNOSIS — R29.898 WEAKNESS OF RIGHT LOWER EXTREMITY: ICD-10-CM

## 2022-06-01 DIAGNOSIS — M21.41 PES PLANUS OF BOTH FEET: Primary | ICD-10-CM

## 2022-06-01 DIAGNOSIS — R26.9 GAIT DISTURBANCE: ICD-10-CM

## 2022-06-01 PROCEDURE — 97112 NEUROMUSCULAR REEDUCATION: CPT | Performed by: PHYSICAL THERAPIST

## 2022-06-01 PROCEDURE — 97530 THERAPEUTIC ACTIVITIES: CPT | Performed by: PHYSICAL THERAPIST

## 2022-06-01 PROCEDURE — 97110 THERAPEUTIC EXERCISES: CPT | Performed by: PHYSICAL THERAPIST

## 2022-06-01 PROCEDURE — 97116 GAIT TRAINING THERAPY: CPT | Performed by: PHYSICAL THERAPIST

## 2022-06-01 NOTE — PROGRESS NOTES
Physical Therapy Daily Progress Note    Patient: Shaheen Rivera   : 1990  Diagnosis/ICD-10 Code:  Pes planus of both feet [M21.41, M21.42]  Referring practitioner: Marta Lal MD  Date of Initial Visit: Type: THERAPY  Noted: 2022  Today's Date: 2022  Patient seen for 31 sessions           Subjective I was traveling for work and had to walk a lot in the airport. Knee was hurting and my L hip started hurting as well. The knee buckled on me a couple of times walking down the ramp from the plane.    Objective          Palpation   Left   No palpable tenderness to the gluteus magali, gluteus medius and obturator externus.   Tenderness of the TFL.     Passive Range of Motion   Left Hip   Normal passive range of motion    Strength/Myotome Testing     Left Hip   Planes of Motion   Flexion: 5  Abduction: 5  Adduction: 5  External rotation: 5          Exercise:  -TM for 5 min working on equal weight shift and stance time  -single leg press, 160lb 4x20, focusing on slow motion, pushing through the heel to get a greater contraction of the quad  -matrix hamstring curl, single leg, 25lb 3x20  -matrix hip abd, 60lb, 2x20, 80lb x20  -knee flexion stretch, foot on [place] hip platform, 4x20 sec DEFER  -TKE, blue band, 20x 5 sec (some tactile cues for quad contraction)    -6 inch step ups fwd and lateral  2x20  each  -4 inch step downs , 2x10 (verbal cues focusing on slow, controlled descent) to a L heel touch  -wall squats (small ROM) w/ball, 10x 5 sec  DEFER  -mini squat on rockerboard 15x 5 sec  DEFER  -manual knee ext resistance at 90 and 60 deg, 5 x each, for 10 sec hold  -hamstring and calf stretching 3x20 sec    -gait in front of a mirror to help reduce trunk shift over the L LE and to spend equal time on LE in stance phase    Assessment/Plan  Worked on gait in front of the mirror today as to give verbal and visual feedback about his gait. He has had the knee buckle on his several times during travel over  the weekend. Not fully trusting his knee, along the with weakness, keeps his gait antalgic. Over the weekend developed some L hip pain. Joint PROM is normal, strength good, tenderness over TFL I believe from the altered gait pattern. He continues to work hard on his own, but his VMO is still very weak, even with some decreased sensation over that area to light touch. I sent an appeal letter to his WC last week, as he continues to need more therapy. Also reaching out to surgeon about continued weakness and her thoughts on EMG testing.          Timed:    Manual Therapy:         mins  38750;  Therapeutic Exercise:    15     mins  53039;     Neuromuscular Soumya:    15    mins  94081;    Therapeutic Activity:     8     mins  38800;     Gait Training:      10     mins  24557;     Ultrasound:          mins  34314;    Electrical Stimulation:         mins  87816 ( );  Iontophoresis         mins 91995;  Aquatic Therapy         mins 70838;  Dry Needling                   mins 95645/ 20561 (Self-pay)    Untimed:  Electrical Stimulation:         mins  80140 ( );  Traction:         mins  68516;     Timed Treatment:   48   mins   Total Treatment:     48   mins    Khushi Garcia PT, CDNT  Physical Therapist    [place] License:[id number]

## 2022-06-02 DIAGNOSIS — Z98.890 S/P ACL RECONSTRUCTION: Primary | ICD-10-CM

## 2022-06-03 ENCOUNTER — TREATMENT (OUTPATIENT)
Dept: PHYSICAL THERAPY | Facility: CLINIC | Age: 32
End: 2022-06-03

## 2022-06-03 DIAGNOSIS — R26.9 GAIT DISTURBANCE: ICD-10-CM

## 2022-06-03 DIAGNOSIS — M25.561 ACUTE PAIN OF RIGHT KNEE: Primary | ICD-10-CM

## 2022-06-03 DIAGNOSIS — Z98.890 S/P ACL RECONSTRUCTION: ICD-10-CM

## 2022-06-03 DIAGNOSIS — R29.898 WEAKNESS OF RIGHT LOWER EXTREMITY: ICD-10-CM

## 2022-06-03 PROCEDURE — 97530 THERAPEUTIC ACTIVITIES: CPT | Performed by: PHYSICAL THERAPIST

## 2022-06-03 PROCEDURE — 97110 THERAPEUTIC EXERCISES: CPT | Performed by: PHYSICAL THERAPIST

## 2022-06-03 PROCEDURE — 97112 NEUROMUSCULAR REEDUCATION: CPT | Performed by: PHYSICAL THERAPIST

## 2022-06-03 NOTE — PROGRESS NOTES
Physical Therapy Daily Progress Note    Patient: Shaheen Rivera   : 1990  Diagnosis/ICD-10 Code:  Acute pain of right knee [M25.561]  Referring practitioner: Marta Lal MD  Date of Initial Visit: Type: THERAPY  Noted: 2022  Today's Date: 6/3/2022  Patient seen for 32 sessions           Subjective still wanting to buckle    Objective          Active Range of Motion     Right Knee   Flexion: 130 degrees   Extension: 2 (of HE) degrees           Exercise:  -TM for 5 min working on equal weight shift and stance time  -single leg press, 160lb 4x20, focusing on slow motion, pushing through the heel to get a greater contraction of the quad  -matrix hamstring curl, single leg, 25lb 3x20  -matrix hip abd, 60lb, 2x20, 80lb x20  -knee flexion stretch, foot on Bettie hip platform, 4x20 sec   -TKE, blue band, 20x 5 sec (some tactile cues for quad contraction)    -6 inch step ups lateral  2x10  -4 inch step downs , 2x10 (verbal cues focusing on slow, controlled descent) to a L heel touch  -mini squat on rockerboard 15x 5 sec   -crouched side stepping with green band at ankle, 2x10  -manual knee ext resistance at 90 and 60 deg, 5 x each, for 10 sec hold  -hamstring and calf stretching 3x20 sec       Assessment/Plan  Shaheen still is having weakness of the VMO and bucking in the knee, especially is walking down stairs or on an incline. If he shortens his stride, he has less antalgia with his gait.Talked to surgeon and she has ordered an EMG/Nerve conduction study. Waiting to hear if we have more visits approved from my appeal to the [employer] company         Timed:    Manual Therapy:         mins  65320;  Therapeutic Exercise:    23     mins  78226;     Neuromuscular Soumya:    12    mins  63004;    Therapeutic Activity:     10     mins  25036;     Gait Training:           mins  97839;     Ultrasound:          mins  83557;    Electrical Stimulation:         mins  43553 ( );  Iontophoresis         mins  84059;  Aquatic Therapy         mins 06780;  Dry Needling                   mins 20560/ 20561 (Self-pay)    Untimed:  Electrical Stimulation:         mins  59972 ( );  Traction:         mins  56305;     Timed Treatment:   45   mins   Total Treatment:     45   mins    Khushi Garcia PT, CDNT  Physical Therapist    [place] License:[id number]

## 2022-06-10 ENCOUNTER — OFFICE VISIT (OUTPATIENT)
Dept: PHYSICAL THERAPY | Facility: CLINIC | Age: 32
End: 2022-06-10

## 2022-06-10 DIAGNOSIS — M21.42 PES PLANUS OF BOTH FEET: Primary | ICD-10-CM

## 2022-06-10 DIAGNOSIS — M21.41 PES PLANUS OF BOTH FEET: Primary | ICD-10-CM

## 2022-06-10 PROCEDURE — 97110 THERAPEUTIC EXERCISES: CPT | Performed by: PHYSICAL THERAPIST

## 2022-06-10 PROCEDURE — 97760 ORTHOTIC MGMT&TRAING 1ST ENC: CPT | Performed by: PHYSICAL THERAPIST

## 2022-06-10 NOTE — PROGRESS NOTES
Physical Therapy Daily Progress/Reassessment Note    Patient: Shaheen Rivera   : 1990  Diagnosis/ICD-10 Code:  Pes planus of both feet [M21.41, M21.42]  Referring practitioner: Marta Lal MD  Date of Initial Visit: Type: THERAPY  Noted: 2022  Today's Date: 6/10/2022  Patient seen for 2 sessions           Subjective: Shaheen stated that noticed the presence of the orthotcs in his arches while walking and bearing weight.     Objective     Treatment    Orthotic fit/train:  In sitting each orthotic was placed under his feet.  Shaheen fully plantarflexed each ankle and both of the orthotics maintained full contact with the arch into peak plantarflexion. I then trimmed each orthotic to fit his shoes.    Therapeutic exercise  1. 12 of a mile ambulating around the track with the orthotics in his shoes.  2. Runner's stretch, 30s x 2, B    Patient education:  Shaheen was given instructions for orhotics break in, general care and the purchase of original yoga toes for intrinsic muscle stretching of his feet were given.  I also added the runner's stretch to his HEP.    Assessment & Plan     Assessment    Assessment details: Shaheen Rivera has been seen for 2 physical therapy sessions for pes planus of both feet.  Treatment has included therapeutic exercise, neuro-muscular retraining , patient education with home exercise program  and orthotic fabrication . Progress to physical therapy goals is fair to good.  He will benefit from continued skilled physical therapy to address remaining impairments and functional limitations.   Prognosis details: Good fit with the orthotics.  It will take time for Shaheen's kinetic chain to adapt to the orthotics.  He required verbal cues for the runner's stretch and instruction for self care.    Goals  Plan Goals: STGs to be met in 4 weeks  1. The orthotics arrive and the break in process begins. (met)  2. The runner's stretch, with the orthotics in his shoes, is added to  his HEP. (met)    LTGs to be met in 12 weeks  1. Shaheen is wearing the custom made orthotics for all ADLs. (ongoing)  2. He is independent with a HEP and self/orthotics care (ongoing)    Plan  Therapy options: will be seen for skilled therapy services  Planned therapy interventions: orthotic fitting/training, flexibility, functional ROM exercises, home exercise program, joint mobilization, strengthening, stretching and manual therapy  Frequency: 1x month  Duration in weeks: 4  Treatment plan discussed with: patient  Plan details: To see Shaheen as necessary as he adjusts to the orthotics.            Timed:    Manual Therapy:         mins  01946;  Therapeutic Exercise:    23     mins  06170;     Neuromuscular Soumya:        mins  10641;    Therapeutic Activity:          mins  20450;     Gait Training:           mins  91087;     Ultrasound:          mins  40333;    Electrical Stimulation:         mins  54118 ( );  Iontophoresis         mins 24588;  Aquatic Therapy         mins 87228;  Dry Needling                    mins 27674/ 39709 (Self-pay)  Orthotic fit/train            15 mins 40342    Untimed:  Electrical Stimulation:         mins  44286 ( );  Traction:         mins  38156;     Timed Treatment:   38   mins   Total Treatment:     38   mins    Mukund Alfaro PT  Physical Therapist    [place] License:[id number]

## 2022-06-20 ENCOUNTER — OFFICE VISIT (OUTPATIENT)
Dept: ORTHOPEDIC SURGERY | Facility: CLINIC | Age: 32
End: 2022-06-20

## 2022-06-20 VITALS — WEIGHT: 247 LBS | HEIGHT: 72 IN | BODY MASS INDEX: 33.46 KG/M2 | TEMPERATURE: 98 F

## 2022-06-20 DIAGNOSIS — M25.552 LEFT HIP PAIN: ICD-10-CM

## 2022-06-20 DIAGNOSIS — M54.50 ACUTE MIDLINE LOW BACK PAIN WITHOUT SCIATICA: ICD-10-CM

## 2022-06-20 DIAGNOSIS — Z98.890 S/P ACL RECONSTRUCTION: ICD-10-CM

## 2022-06-20 DIAGNOSIS — R52 PAIN: Primary | ICD-10-CM

## 2022-06-20 PROCEDURE — 72170 X-RAY EXAM OF PELVIS: CPT | Performed by: ORTHOPAEDIC SURGERY

## 2022-06-20 PROCEDURE — 72100 X-RAY EXAM L-S SPINE 2/3 VWS: CPT | Performed by: ORTHOPAEDIC SURGERY

## 2022-06-20 PROCEDURE — 99213 OFFICE O/P EST LOW 20 MIN: CPT | Performed by: ORTHOPAEDIC SURGERY

## 2022-06-20 RX ORDER — RIVAROXABAN 20 MG/1
20 TABLET, FILM COATED ORAL
COMMUNITY
Start: 2022-06-08

## 2022-06-20 RX ORDER — TESTOSTERONE CYPIONATE 200 MG/ML
100 INJECTION, SOLUTION INTRAMUSCULAR
COMMUNITY
Start: 2022-05-17

## 2022-06-20 RX ORDER — METHYLPREDNISOLONE 4 MG/1
TABLET ORAL
Qty: 21 TABLET | Refills: 0 | Status: SHIPPED | OUTPATIENT
Start: 2022-06-20 | End: 2022-09-22

## 2022-06-20 NOTE — PROGRESS NOTES
ACL follow Up     Patient: Shaheen Rivera        YOB: 1990      Chief Complaints: knee pain right and left hip pain low back pain      History of Present Illness: Pt is here f/u knee arthroscopy, ACL reconstruction on the right his quad still coming back but his medial quads really are not or are at least very slow to come back.  He does have an EMG scheduled in July.  Now complaining of some anterior left hip pain and some low back pain he is back in the gym working out        Allergies: No Known Allergies    Medications:   Home Medications:  Current Outpatient Medications on File Prior to Visit   Medication Sig   • acyclovir (ZOVIRAX) 200 MG capsule Take 1 capsule by mouth 2 (Two) Times a Day.   • albuterol sulfate  (90 Base) MCG/ACT inhaler Inhale 2 puffs orally every 4 hours as needed for wheezing or shortness of air.   • anastrozole (ARIMIDEX) 1 MG tablet Take 1 mg by mouth Daily.   • apixaban (ELIQUIS) 5 MG tablet tablet Take 5 mg by mouth 2 (two) times a day. HOLD PER MD INSTR-48 HOURS PRIOR-   • carvedilol (COREG) 12.5 MG tablet Take 1.5 tablets by mouth 2 (Two) Times a Day.   • cephalexin (KEFLEX) 500 MG capsule Take 1 capsule by mouth Every 12 (Twelve) Hours.   • cetirizine (zyrTEC) 10 MG tablet Take 1 tablet by mouth Every Night.   • Entresto 24-26 MG tablet Take  by mouth 2 (Two) Times a Day.   • fluticasone (FLONASE) 50 MCG/ACT nasal spray Administer 1spray in each nostril twice daily.   • furosemide (LASIX) 40 MG tablet 40 mg 2 (Two) Times a Day.   • spironolactone (ALDACTONE) 25 MG tablet Take 25 mg by mouth Daily.   • Testosterone Cypionate (DEPOTESTOTERONE CYPIONATE) 200 MG/ML injection Inject 1 mL into the shoulder, thigh, or buttocks Every 7 (Seven) Days.   • tiotropium bromide monohydrate (Spiriva Respimat) 2.5 MCG/ACT aerosol solution inhaler Inhale 2 puffs Daily for 30 days.     No current facility-administered medications on file prior to visit.     Current  Medications:  Scheduled Meds:  Continuous Infusions:No current facility-administered medications for this visit.    PRN Meds:.          Physical Exam: 31 y.o. male  General Appearance:    Alert, cooperative, in no acute distress                 There were no vitals filed for this visit.   Patient is alert and oriented ×3 no acute distress normal mood physical exam.  Physical exam of the knee, incisions looked good there is no erythema, calf is soft and non-tender.  No sign or sx of DVT. Full ROM, Neg Lachman, Good SLR and quad control  Right knee exam his is a stable exam ACL is intact quads on the left are great the right still or not really where I would like to see them at this stage he can fire it and not as much of his VMO but is anterior lateral quad he is firing    As far as his left hip goes he does have good internal extra rotation he does have palp tenderness over his hip flexor pain with resisted hip flexion negative straight leg raise negative Lasegue's  X-rays AP and lateral of his lumbar spine with an AP of the pelvis demonstrates a very mild scoliosis otherwise no real acute pathology  Assessment  S/P knee scope. ACL reconstruction, overall doing better quads are still much slower to come back and I would like an EMG as scheduled which I think is a great idea Hello okay right thank you Zina for taking the time to do that okay bye-bye  Plan: Continue PT, work on quads,  Follow up in 4-5 weeks.

## 2022-06-30 ENCOUNTER — TELEPHONE (OUTPATIENT)
Dept: ORTHOPEDIC SURGERY | Facility: CLINIC | Age: 32
End: 2022-06-30

## 2022-07-01 NOTE — TELEPHONE ENCOUNTER
I have sent this to Dr. Lal to do an appeal letter.  PT has already appealed it and now they need a letter from the MD.

## 2022-07-05 NOTE — TELEPHONE ENCOUNTER
Rocío Puri, this is the first I have heard that I need to send a letter.  If it goes in the media section that is not something that I look at and less I know to go to look for it.  I will do the letter today

## 2022-07-25 ENCOUNTER — OFFICE VISIT (OUTPATIENT)
Dept: ORTHOPEDIC SURGERY | Facility: CLINIC | Age: 32
End: 2022-07-25

## 2022-07-25 VITALS — HEIGHT: 72 IN | TEMPERATURE: 98.5 F | BODY MASS INDEX: 34.4 KG/M2 | WEIGHT: 254 LBS

## 2022-07-25 DIAGNOSIS — Z98.890 S/P ACL RECONSTRUCTION: Primary | ICD-10-CM

## 2022-07-25 PROCEDURE — 99212 OFFICE O/P EST SF 10 MIN: CPT | Performed by: ORTHOPAEDIC SURGERY

## 2022-07-25 NOTE — PROGRESS NOTES
Right ACL follow Up     Patient: Shaheen Rivera        YOB: 1990      Chief Complaints: Right knee pain      History of Present Illness: Pt is here f/u knee arthroscopy, ACL reconstruction he is been very slow to get his muscle back we did send him for an EMG which looks fine he has a little bit lower amplitude but nothing to my eyes that looks like an abnormality.  He is very frustrated states this is been a year and a half of his life as we were delayed getting to the OR due to heart condition.  I told him this study should reassure him that everything is intact we just has to work harder on getting it stronger.  States he is having some left buttock pain when he tries to strengthen we talked about ways to strengthen this isolating the right leg        Allergies: No Known Allergies    Medications:   Home Medications:  Current Outpatient Medications on File Prior to Visit   Medication Sig   • acyclovir (ZOVIRAX) 200 MG capsule Take 1 capsule by mouth 2 (Two) Times a Day.   • albuterol sulfate  (90 Base) MCG/ACT inhaler Inhale 2 puffs orally every 4 hours as needed for wheezing or shortness of air.   • anastrozole (ARIMIDEX) 1 MG tablet Take 1 mg by mouth Daily.   • carvedilol (COREG) 12.5 MG tablet Take 1.5 tablets by mouth 2 (Two) Times a Day.   • cetirizine (zyrTEC) 10 MG tablet Take 1 tablet by mouth Every Night.   • Entresto 24-26 MG tablet Take  by mouth 2 (Two) Times a Day.   • fluticasone (FLONASE) 50 MCG/ACT nasal spray Administer 1spray in each nostril twice daily.   • furosemide (LASIX) 40 MG tablet 40 mg 2 (Two) Times a Day.   • spironolactone (ALDACTONE) 25 MG tablet Take 25 mg by mouth Daily.   • Testosterone Cypionate (DEPOTESTOTERONE CYPIONATE) 200 MG/ML injection Inject 100 mg into the appropriate muscle as directed by prescriber.   • Xarelto 20 MG tablet    • cephalexin (KEFLEX) 500 MG capsule Take 1 capsule by mouth Every 12 (Twelve) Hours.   • methylPREDNISolone (MEDROL) 4  "MG dose pack Use as directed by package instructions   • sacubitril-valsartan (ENTRESTO) 49-51 MG tablet Take 1 tablet by mouth 2 (Two) Times a Day.   • tiotropium bromide monohydrate (Spiriva Respimat) 2.5 MCG/ACT aerosol solution inhaler Inhale 2 puffs Daily for 30 days.     No current facility-administered medications on file prior to visit.     Current Medications:  Scheduled Meds:  Continuous Infusions:No current facility-administered medications for this visit.    PRN Meds:.          Physical Exam: 32 y.o. male  General Appearance:    Alert, cooperative, in no acute distress                   Vitals:    07/25/22 0925   Temp: 98.5 °F (36.9 °C)   Weight: 115 kg (254 lb)   Height: 182.9 cm (72\")   PainSc: 0-No pain      Patient is alert and oriented ×3 no acute distress normal mood physical exam.  Physical exam of the knee, incisions looked good there is no erythema, calf is soft and non-tender.  No sign or sx of DVT. Full ROM, Neg Lachman, Good SLR and quad control quads are much better still nowhere near the left leg but I think much better      Assessment  S/P knee scope. ACL reconstruction, overall doing well.  He is out of PT visits and will work on his own      Plan: Continue PT, work on quads, I reminded him that sometimes these are 12-month recovery use.  He is now 6 months postop I do think he is frustrated and I understand but I think ultimately he is going to get where he needs to be              "

## 2022-08-02 ENCOUNTER — TELEPHONE (OUTPATIENT)
Dept: ORTHOPEDIC SURGERY | Facility: CLINIC | Age: 32
End: 2022-08-02

## 2022-08-02 NOTE — TELEPHONE ENCOUNTER
I LOOKED INTO THIS PATIENTS P.T. VISIT HE HAS BEEN APPROVED THRU OCT, 2022, SO I AM NOT DOING ANYTHING WITH THIS, THE  APPROVED 12 ADDITIONAL VISITS, LLR

## 2022-08-10 ENCOUNTER — TREATMENT (OUTPATIENT)
Dept: PHYSICAL THERAPY | Facility: CLINIC | Age: 32
End: 2022-08-10

## 2022-08-10 DIAGNOSIS — R26.9 GAIT DISTURBANCE: ICD-10-CM

## 2022-08-10 DIAGNOSIS — R29.898 WEAKNESS OF RIGHT LOWER EXTREMITY: ICD-10-CM

## 2022-08-10 DIAGNOSIS — M25.561 ACUTE PAIN OF RIGHT KNEE: ICD-10-CM

## 2022-08-10 DIAGNOSIS — Z98.890 S/P ACL RECONSTRUCTION: Primary | ICD-10-CM

## 2022-08-10 DIAGNOSIS — R53.1 STRENGTH LOSS OF: ICD-10-CM

## 2022-08-10 PROCEDURE — 97530 THERAPEUTIC ACTIVITIES: CPT | Performed by: PHYSICAL THERAPIST

## 2022-08-10 PROCEDURE — 97110 THERAPEUTIC EXERCISES: CPT | Performed by: PHYSICAL THERAPIST

## 2022-08-10 PROCEDURE — 97140 MANUAL THERAPY 1/> REGIONS: CPT | Performed by: PHYSICAL THERAPIST

## 2022-08-10 NOTE — PROGRESS NOTES
Re-Assessment / Re-Certification        Patient: Shaheen Rivera   : 1990  Diagnosis/ICD-10 Code:  S/P ACL reconstruction [Z98.890]  Referring practitioner: Belinda Lal MD  Date of Initial Visit: Type: THERAPY  Noted: 2022  Today's Date: 8/10/2022  Patient seen for 33 sessions      Subjective:     Clinical Progress: unchanged  Home Program Compliance: Yes  Treatment has included:  therapeutic exercise, manual therapy, therapeutic activity, neuro-muscular retraining , gait training, electrical stimulation , patient education with home exercise program  and orthotic fabrication     Subjective Shaheen just got visits approved again for therapy. His last ortho visit was on 6/3/22. Since this time he had an EMG/NCS on the LEs some mild showing mild delay in the R VMO  Objective          Palpation   Left   Hypertonic in the TFL.   Tenderness of the TFL.   Trigger point to TFL.     Tenderness     Right Knee   Tenderness in the lateral joint line.     Strength/Myotome Testing     Left Hip   Planes of Motion   Flexion: 4+  Abduction: 4  Adduction: 4+  External rotation: 4    Right Hip   Planes of Motion   Flexion: 4+  Abduction: 4+  Adduction: 4+  External rotation: 4+    Left Knee   Flexion: 5  Extension: 5    Right Knee   Flexion: 4+  Right knee extension strength: 4 to 4+/5.  Right knee quadriceps contraction strength: fair contraction of VMO.    Tests     Right Hip   Negative MEG, FADIR and scour.     Ambulation     Observational Gait   Base of support: increased    Quality of Movement During Gait   Trunk    Trunk (Left): Positive left lateral lean over stance limb.         Therapeutic exercises/activity:  -hamstring stretching, 90/90 3x20 sec  -ITBand stretching supine 3x20 sec each  -Review of HEP currently doing at the gym/home, still riding the [place]  -discussion of the EMG/NCS    Manual:  TRIAL OF dry needling, using threading and direct techniques, obtaining written and verbal consent to  treat after discussing benefits and risks. Patient position during treatment: R SL. Muscles treated: L TFL and glute med. Response:  LTRs. Clean needle technique observed at all times, precautions for lung fields, neurovascular structures observed. Manual palpation and assessment performed before, during, and after session.    STM to L TFL and glute med with medium pressure, ischemic pressure to TFL for further trigger point release    Functional outcome score:   Knee Outcome Survey=42/80 or 52.5%      Assessment & Plan     Assessment    Assessment details: Shaheen Rivera has been seen for 33 physical therapy sessions for s/p R ACL reconstruction.  Treatment has included therapeutic exercise, manual therapy, therapeutic activity, neuro-muscular retraining , gait training, electrical stimulation , patient education with home exercise program , orthotic fabrication  and dry needling . Progress to physical therapy goals is fair to good. His last approved visit was on 6/3/22 and is starting back today. He continues with weakness of the R VMO and continues with L hip pain (some weakness, trigger points in TFL and glute med), likely from his continued altered gait. He has had 3 instances of the knee buckling, with 2 falls. His ACL feels solidly intact. He will benefit from continued skilled physical therapy to address remaining impairments and functional limitations.     Prognosis: good    Goals  Plan Goals: ST wks  1. Patient will be independent with education for symptom management, joint protection and strategies to minimize stress on affected tissues (MET)   2. Patient will be able to perform a SLR without a quad lag for improved control of his knee (MET)   3. Pt to improve R knee ROM to ext=0 deg and hurxzxq=559 deg for improved gait pattern (MET)   4. Pt to ambulate without an AD with no gait deficits (PART MET)  Still with mild antalgia    LT wks  1. Pt to improve R knee ROM to ext=2 deg of HE to  obvedng=721 deg for improved gait pattern (ONGOING) (PART MET)   2. Pt to improve score on Knee Outcome Survey from 24% to 90% for overall functional improvement (ONGOING) improved to 52.5% now almost weaned out of the brace  3. Patient will increase R knee strength to 5/5 to improve functional mobility (ONGOING)   4. Patient will negotiate stairs reciprocally without rail support (ONGOING) (PART MET) for ascending  5. Patient will demonstrate an independent HEP for core and knee strength and flexibility/ROM. (ONGOING)     Plan  Therapy options: will be seen for skilled therapy services  Frequency: 1x week  Duration in weeks: 12  Treatment plan discussed with: patient           Recommendations: Continue as planned  Timeframe: 3 months  Prognosis to achieve goals: good    PT Signature: Khushi Garcia PT, CDNT  License: [id number]      Based upon review of the patient's progress and continued therapy plan, it is my medical opinion that Shaheen Rivera should continue physical therapy treatment at [facility]  [address] [place] [address]  [phone].    Signature: __________________________________  Belinda Lla MD  [id number]    Timed:  Manual Therapy:    20     mins  92246;  Therapeutic Exercise:     10    mins  46058;     Neuromuscular Soumya:        mins  74204;    Therapeutic Activity:     12     mins  01924;     Gait Training:           mins  60036;     Ultrasound:          mins  20778;    Electrical Stimulation:         mins  31888 ( );  Iontophoresis         mins 52248;  Dry Needling              5     mins 53549/20561 (Self-pay)    Untimed:  Electrical Stimulation:         mins  63797 ( );  Mechanical Traction:         mins  57791;     Timed Treatment:   47   mins   Total Treatment:     47   mins

## 2022-08-23 ENCOUNTER — TREATMENT (OUTPATIENT)
Dept: PHYSICAL THERAPY | Facility: CLINIC | Age: 32
End: 2022-08-23

## 2022-08-23 DIAGNOSIS — R29.898 WEAKNESS OF RIGHT LOWER EXTREMITY: ICD-10-CM

## 2022-08-23 DIAGNOSIS — G89.29 CHRONIC PAIN OF RIGHT KNEE: ICD-10-CM

## 2022-08-23 DIAGNOSIS — R53.1 STRENGTH LOSS OF: ICD-10-CM

## 2022-08-23 DIAGNOSIS — M25.561 CHRONIC PAIN OF RIGHT KNEE: ICD-10-CM

## 2022-08-23 DIAGNOSIS — R26.9 GAIT DISTURBANCE: ICD-10-CM

## 2022-08-23 DIAGNOSIS — Z98.890 S/P ACL RECONSTRUCTION: Primary | ICD-10-CM

## 2022-08-23 PROCEDURE — 97110 THERAPEUTIC EXERCISES: CPT | Performed by: PHYSICAL THERAPIST

## 2022-08-23 PROCEDURE — 97112 NEUROMUSCULAR REEDUCATION: CPT | Performed by: PHYSICAL THERAPIST

## 2022-08-23 PROCEDURE — 20561 NDL INSJ W/O NJX 3+ MUSC: CPT | Performed by: PHYSICAL THERAPIST

## 2022-08-23 PROCEDURE — 97140 MANUAL THERAPY 1/> REGIONS: CPT | Performed by: PHYSICAL THERAPIST

## 2022-08-23 NOTE — PROGRESS NOTES
Physical Therapy Daily Treatment Note    Patient: Shaheen Rivera   : 1990  Diagnosis/ICD-10 Code:  S/P ACL reconstruction [Z98.890]  Referring practitioner: Belinda Lal MD  Date of Initial Visit: Type: THERAPY  Noted: 2022  Today's Date: 2022  Patient seen for 34 sessions           Subjective the needling did help last time. Took about a day for it to feel better.     Objective   See Exercise, Manual, and Modality Logs for complete treatment.     Exercise:  -bridge with ball squeeze 2x10, hold for 3-5 sec  -attempted R SL bridge, unable to do.   -R hip add in SL, 2x10  -feet over Swiss ball, bridge with SLR x5  -heels on swiss ball, bridge with hamstring curl, 2x5  -Bettie knee ext, 0lb, 3x15 reps 90 to 45 deg, slow return for eccentric quad control  -4 inch step downs, 2x10, cues for slow and controlled, not to IR the R hip    Manual:dry needling, using threading and direct techniques, obtaining verbal consent to treat after discussing benefits and risks. Patient position during treatment: R SL. Muscles treated: L TFL and glute med. Response:  LTRs. Clean needle technique observed at all times, neurovascular structures observed. Manual palpation and assessment performed before, during, and after session.  STM to L gluteals following the needling to improve blood flow and pain reduction    Assessment/Plan  Shaheen is still lacking VMO strength and eccentric control of his quads. Working on improving core strength as well and reducing compensatory movements with walking and stairs         Timed:    Manual Therapy:    10     mins  83914;  Therapeutic Exercise:    20     mins  19078;     Neuromuscular Soumya:    10    mins  12566;    Therapeutic Activity:          mins  74151;     Gait Training:           mins  21655;     Ultrasound:          mins  17075;    Electrical Stimulation:         mins  36720 ( );  Iontophoresis         mins 10924;  Aquatic Therapy         mins 20513;  Dry  Needling              6     mins 20560/ 20561 (Self-pay)    Untimed:  Electrical Stimulation:         mins  39521 ( );  Traction:         mins  45199;     Timed Treatment:   40   mins   Total Treatment:     46   mins    Khushi Garcia PT, CDNT  Physical Therapist    [place] License:[id number]    Addendum for changing timed charging units

## 2022-09-06 ENCOUNTER — OFFICE VISIT (OUTPATIENT)
Dept: ORTHOPEDIC SURGERY | Facility: CLINIC | Age: 32
End: 2022-09-06

## 2022-09-06 VITALS — BODY MASS INDEX: 34.4 KG/M2 | TEMPERATURE: 97.3 F | WEIGHT: 254 LBS | HEIGHT: 72 IN

## 2022-09-06 DIAGNOSIS — Z98.890 S/P ACL RECONSTRUCTION: Primary | ICD-10-CM

## 2022-09-06 PROCEDURE — 99212 OFFICE O/P EST SF 10 MIN: CPT | Performed by: ORTHOPAEDIC SURGERY

## 2022-09-06 RX ORDER — HYDROXYZINE HYDROCHLORIDE 25 MG/1
TABLET, FILM COATED ORAL
COMMUNITY
Start: 2022-08-08 | End: 2023-03-22

## 2022-09-06 RX ORDER — SYRINGE WITH NEEDLE, 1 ML 25GX5/8"
SYRINGE, EMPTY DISPOSABLE MISCELLANEOUS
COMMUNITY
Start: 2022-06-22

## 2022-09-06 RX ORDER — TESTOSTERONE CYPIONATE 200 MG/ML
200 INJECTION, SOLUTION INTRAMUSCULAR
COMMUNITY
Start: 2022-06-22 | End: 2022-09-22 | Stop reason: SDUPTHER

## 2022-09-06 RX ORDER — NEEDLES, DISPOSABLE 25GX5/8"
NEEDLE, DISPOSABLE MISCELLANEOUS
COMMUNITY
Start: 2022-06-22

## 2022-09-06 NOTE — PROGRESS NOTES
Right ACL follow Up     Patient: Shaheen Rivera        YOB: 1990      Chief Complaints: right knee pain      History of Present Illness: Pt is here f/u knee arthroscopy, ACL reconstruction he is still very frustrated he feels like his knee is weak we did do an EMG which was normal told him I will offer offered him an MRI however I do not think he wants to go there yet I honestly do not think it is needed.  I asked him what he is doing strength wise he states he is on the Pel[employer] 3 times a week he does right leg strengthening 2-3 times a week feels like his change in gait is affected his left hip and he still seeing physical therapy for his left hip        Allergies: No Known Allergies    Medications:   Home Medications:  Current Outpatient Medications on File Prior to Visit   Medication Sig   • acyclovir (ZOVIRAX) 200 MG capsule Take 1 capsule by mouth 2 (Two) Times a Day.   • albuterol sulfate  (90 Base) MCG/ACT inhaler Inhale 2 puffs orally every 4 hours as needed for wheezing or shortness of air.   • anastrozole (ARIMIDEX) 1 MG tablet Take 1 mg by mouth Daily.   • carvedilol (COREG) 12.5 MG tablet Take 1.5 tablets by mouth 2 (Two) Times a Day.   • cephalexin (KEFLEX) 500 MG capsule Take 1 capsule by mouth Every 12 (Twelve) Hours.   • cetirizine (zyrTEC) 10 MG tablet Take 1 tablet by mouth Every Night.   • Entresto 24-26 MG tablet Take  by mouth 2 (Two) Times a Day.   • fluticasone (FLONASE) 50 MCG/ACT nasal spray Administer 1spray in each nostril twice daily.   • furosemide (LASIX) 40 MG tablet 40 mg 2 (Two) Times a Day.   • methylPREDNISolone (MEDROL) 4 MG dose pack Use as directed by package instructions   • sacubitril-valsartan (ENTRESTO) 49-51 MG tablet Take 1 tablet by mouth 2 (Two) Times a Day.   • spironolactone (ALDACTONE) 25 MG tablet Take 25 mg by mouth Daily.   • Testosterone Cypionate (DEPOTESTOTERONE CYPIONATE) 200 MG/ML injection Inject 100 mg into the appropriate muscle as  directed by prescriber.   • tiotropium bromide monohydrate (Spiriva Respimat) 2.5 MCG/ACT aerosol solution inhaler Inhale 2 puffs Daily for 30 days.   • Xarelto 20 MG tablet      No current facility-administered medications on file prior to visit.     Current Medications:  Scheduled Meds:  Continuous Infusions:No current facility-administered medications for this visit.    PRN Meds:.          Physical Exam: 32 y.o. male  General Appearance:    Alert, cooperative, in no acute distress                 There were no vitals filed for this visit.   Patient is alert and oriented ×3 no acute distress normal mood physical exam.  Physical exam of the knee, incisions looked good there is no erythema, calf is soft and non-tender.  No sign or sx of DVT. Full ROM, Neg Lachman, Good SLR and quad control quads are definitely better than they were before he keeps saying that medial muscles do not work but when I asked him to contract his abductors there working his quads working he has good quad control just girth is down      Assessment  S/P knee scope. ACL reconstruction, overall doing okay still frustrated I talked to him about increasing his activity I think doing that strengthening 2-3 times a week is not enough I think it is going to take more than that to get him stronger faster    Plan: I want him focusing more on his right leg focusing more on his quads tone and there is really nothing else that will speed this up other than that I will see him back in around 6 weeks

## 2022-09-08 DIAGNOSIS — Z98.890 S/P ACL RECONSTRUCTION: Primary | ICD-10-CM

## 2022-09-09 DIAGNOSIS — Z00.00 PREVENTATIVE HEALTH CARE: Primary | ICD-10-CM

## 2022-09-12 ENCOUNTER — TELEPHONE (OUTPATIENT)
Dept: ORTHOPEDIC SURGERY | Facility: CLINIC | Age: 32
End: 2022-09-12

## 2022-09-12 NOTE — TELEPHONE ENCOUNTER
----- Message from Marta Lal MD sent at 9/12/2022  1:43 PM EDT -----  Can you call him and tell him that his graft looks great his knee looks great.  I am fully confident that he will get his strength in his function back he just needs to be more diligent with his exercises and focus on those quads a little bit more

## 2022-09-22 ENCOUNTER — TREATMENT (OUTPATIENT)
Dept: PHYSICAL THERAPY | Facility: CLINIC | Age: 32
End: 2022-09-22

## 2022-09-22 ENCOUNTER — OFFICE VISIT (OUTPATIENT)
Dept: INTERNAL MEDICINE | Age: 32
End: 2022-09-22

## 2022-09-22 VITALS
BODY MASS INDEX: 34.4 KG/M2 | OXYGEN SATURATION: 98 % | WEIGHT: 254 LBS | SYSTOLIC BLOOD PRESSURE: 100 MMHG | DIASTOLIC BLOOD PRESSURE: 60 MMHG | TEMPERATURE: 97.1 F | HEIGHT: 72 IN | HEART RATE: 69 BPM

## 2022-09-22 DIAGNOSIS — R29.898 WEAKNESS OF RIGHT LOWER EXTREMITY: ICD-10-CM

## 2022-09-22 DIAGNOSIS — M25.561 CHRONIC PAIN OF RIGHT KNEE: ICD-10-CM

## 2022-09-22 DIAGNOSIS — Z00.00 ENCOUNTER FOR ANNUAL HEALTH EXAMINATION: Primary | ICD-10-CM

## 2022-09-22 DIAGNOSIS — Z98.890 S/P ACL RECONSTRUCTION: Primary | ICD-10-CM

## 2022-09-22 DIAGNOSIS — R26.9 GAIT DISTURBANCE: ICD-10-CM

## 2022-09-22 DIAGNOSIS — G89.29 CHRONIC PAIN OF RIGHT KNEE: ICD-10-CM

## 2022-09-22 DIAGNOSIS — R53.1 STRENGTH LOSS OF: ICD-10-CM

## 2022-09-22 PROBLEM — I50.21 ACUTE SYSTOLIC CHF (CONGESTIVE HEART FAILURE) (HCC): Status: RESOLVED | Noted: 2021-01-06 | Resolved: 2022-09-22

## 2022-09-22 PROCEDURE — 99395 PREV VISIT EST AGE 18-39: CPT | Performed by: INTERNAL MEDICINE

## 2022-09-22 PROCEDURE — 97140 MANUAL THERAPY 1/> REGIONS: CPT | Performed by: PHYSICAL THERAPIST

## 2022-09-22 PROCEDURE — 97112 NEUROMUSCULAR REEDUCATION: CPT | Performed by: PHYSICAL THERAPIST

## 2022-09-22 PROCEDURE — 90714 TD VACC NO PRESV 7 YRS+ IM: CPT | Performed by: INTERNAL MEDICINE

## 2022-09-22 PROCEDURE — 90677 PCV20 VACCINE IM: CPT | Performed by: INTERNAL MEDICINE

## 2022-09-22 PROCEDURE — 90471 IMMUNIZATION ADMIN: CPT | Performed by: INTERNAL MEDICINE

## 2022-09-22 PROCEDURE — 97110 THERAPEUTIC EXERCISES: CPT | Performed by: PHYSICAL THERAPIST

## 2022-09-22 PROCEDURE — 90472 IMMUNIZATION ADMIN EACH ADD: CPT | Performed by: INTERNAL MEDICINE

## 2022-09-22 NOTE — PATIENT INSTRUCTIONS
** IMPORTANT MESSAGE FROM DR. BRAVO **    In our office, your satisfaction is VERY important to us.     You may receive a survey from [facility] by mail or E-mail for you to provide feedback about your visit. This information is invaluable for me to know what we can do to improve our services.     I ask that you please take a few minutes to complete the survey and let us know how we are doing in serving your needs. (You may receive the survey more than once for multiple visits)    Thank You !    Dr. Bravo    _________________________________________________________________________________________________________________________      ** ADDITIONAL INSTRUCTION / REMINDERS FROM DR. BRAVO **

## 2022-09-22 NOTE — PROGRESS NOTES
"    I N T E R N A L  M E D I C I N E  J U N O H  K I M,  M D      ENCOUNTER DATE:  09/22/2022    Shaheen Rivera / 32 y.o. / male    CHIEF COMPLAINT     Annual Exam      VITALS     Vitals:    09/22/22 0938   BP: 100/60   BP Location: Left arm   Pulse: 69   Temp: 97.1 °F (36.2 °C)   SpO2: 98%   Weight: 115 kg (254 lb)   Height: 182.9 cm (72.01\")       BP Readings from Last 3 Encounters:   09/22/22 100/60   04/05/22 100/70   01/18/22 120/68     Wt Readings from Last 3 Encounters:   09/22/22 115 kg (254 lb)   09/06/22 115 kg (254 lb)   07/25/22 115 kg (254 lb)      Body mass index is 34.44 kg/m².    Blood pressure readings recorded on patient flowsheet:  No flowsheet data found.     MEDICATIONS     Current Outpatient Medications on File Prior to Visit   Medication Sig Dispense Refill   • acyclovir (ZOVIRAX) 200 MG capsule Take 1 capsule by mouth 2 (Two) Times a Day. 60 capsule 1   • albuterol sulfate  (90 Base) MCG/ACT inhaler Inhale 2 puffs orally every 4 hours as needed for wheezing or shortness of air. 8.5 g 3   • anastrozole (ARIMIDEX) 1 MG tablet Take 1 mg by mouth Daily.     • B-D 3CC LUER-IGNACIA SYR 23WS5-7/2 22G X 1-1/2\" 3 ML misc      • BD Hypodermic Needle 18G X 1\" misc      • carvedilol (COREG) 12.5 MG tablet Take 1.5 tablets by mouth 2 (Two) Times a Day.     • cetirizine (zyrTEC) 10 MG tablet Take 1 tablet by mouth Every Night.     • fluticasone (FLONASE) 50 MCG/ACT nasal spray Administer 1spray in each nostril twice daily.     • furosemide (LASIX) 40 MG tablet 40 mg 2 (Two) Times a Day.     • sacubitril-valsartan (ENTRESTO) 49-51 MG tablet Take 1 tablet by mouth 2 (Two) Times a Day.     • spironolactone (ALDACTONE) 25 MG tablet Take 25 mg by mouth Daily.     • Testosterone Cypionate (DEPOTESTOTERONE CYPIONATE) 200 MG/ML injection Inject 100 mg into the appropriate muscle as directed by prescriber.     • Xarelto 20 MG tablet      • hydrOXYzine (ATARAX) 25 MG tablet      • tiotropium bromide monohydrate " (Spiriva Respimat) 2.5 MCG/ACT aerosol solution inhaler Inhale 2 puffs Daily for 30 days. 1 g 0     No current facility-administered medications on file prior to visit.         HISTORY OF PRESENT ILLNESS      Shaheen presents for annual health maintenance visit.  Most recent echocardiogram showed improved normal left ventricular ejection fraction of 60% with mild diastolic dysfunction.  He is on Xarelto for atrial fibrillation.  Denies chest pains, increased dyspnea exertion, PND/orthopnea or palpitations.  He wears an apple watch to monitor for atrial fibrillation.  He is on testosterone injections through urology.  He exercises regularly and tries to eat moderately healthy.    · General health: some medical problems  · Lifestyle:  · Attempting to lose weight?: Yes   · Diet: eats moderately healthy  · Exercise: exercises 5 days/week  · Tobacco: Never used   · Alcohol: occasional/infrequent  · Work: Full-time  · Reproductive health:  · Sexually active?: Yes   · Concern for STD?: No   · Sexual problems?: No problems   · Sees Urologist?: Yes for hypogonadism  · Depression Screening:      PHQ-2/PHQ-9 Depression Screening 9/22/2022   Retired Total Score -   Little Interest or Pleasure in Doing Things 0-->not at all   Feeling Down, Depressed or Hopeless 0-->not at all   PHQ-9: Brief Depression Severity Measure Score 0         PHQ-2: 0 (Not depressed)     PHQ-9: 0 (Negative screening for depression)    Patient Care Team:  Lico Golden MD as PCP - General (Internal Medicine)  Kim Mcmillan MD as Consulting Physician (Cardiology)  Chacorta Salgado Jr., MD as Consulting Physician (Urology)  Sid Sue MD as Consulting Physician (Cardiology)  ______________________________________________________________________    ALLERGIES  No Known Allergies     PFSH:     The following portions of the patient's history were reviewed and updated as appropriate: Allergies / Current Medications / Past Medical History / Surgical  History / Social History / Family History    PROBLEM LIST   Patient Active Problem List   Diagnosis   • Vitamin B 12 deficiency   • Hypogonadism in male   • Retinitis pigmentosa of both eyes   • ADHD (attention deficit hyperactivity disorder)   • GOKUL on CPAP   • HSV-2 infection   • Atrial fibrillation (HCC)   • Acute systolic CHF (congestive heart failure) (HCC)   • Rupture of anterior cruciate ligament of right knee   • Clinical diagnosis of COVID-19       PAST MEDICAL HISTORY  Past Medical History:   Diagnosis Date   • ADHD    • Atrial fibrillation (HCC) 12/28/2020   • COVID     Monoclonal Antibody Infusion on 12/28/2021   • COVID-19 vaccine administered    • HSV-2 (herpes simplex virus 2) infection    • Hx monoclonal drug therapy 12/23/2021    BAM-COVID [place]   • Low testosterone in male    • Sleep apnea     CPAP   • Splenic infarct    • Testosterone deficiency    • Vitamin B 12 deficiency        SURGICAL HISTORY  Past Surgical History:   Procedure Laterality Date   • ADENOIDECTOMY     • APPENDECTOMY     • KNEE ACL RECONSTRUCTION Right 1/18/2022    Procedure: KNEE ANTERIOR CRUCIATE LIGAMENT RECONSTRUCTION WITH AUTOGRAFT, RIGHT KNEE ARTHROSCOPY   ;  Surgeon: Marta Lal MD;  Location: [facility] [place];  Service: Orthopedics;  Laterality: Right;   • TONSILLECTOMY         SOCIAL HISTORY  Social History     Socioeconomic History   • Marital status: Single     Spouse name: has a fiance   • Number of children: 0   Tobacco Use   • Smoking status: Never Smoker   • Smokeless tobacco: Never Used   Vaping Use   • Vaping Use: Never used   Substance and Sexual Activity   • Alcohol use: Yes     Comment: OCCASIONAL    • Drug use: No     Comment: former casual marijuana use (more than 2 years ago)   • Sexual activity: Defer     Partners: Female       FAMILY HISTORY  Family History   Problem Relation Age of Onset   • Lung cancer Mother 49        smoker   • Graves' disease Mother    • Lung cancer Father 52        smoker   • Other  Brother         prediabetes   • Coronary artery disease Paternal Grandmother    • Thyroid disease Paternal Grandmother    • Suicidality Maternal Uncle         committed suicide   • Malig Hyperthermia Neg Hx    • Prostate cancer Neg Hx    • Colon cancer Neg Hx        IMMUNIZATION HISTORY  Immunization History   Administered Date(s) Administered   • COVID-19 (MODERNA) 1st, 2nd, 3rd Dose Only 03/02/2021, 03/31/2021, 11/14/2021   • Flu Vaccine Intradermal Quad 18-64YR 10/20/2007   • Flu Vaccine Quad PF 6-35MO 12/01/2018   • Flu Vaccine Quad PF >36MO 12/29/2017   • Flu Vaccine Split Quad 10/20/2007   • FluLaval/Fluzone >6mos 12/04/2018, 10/10/2021   • Hpv9 10/10/2021   • Influenza, Unspecified 12/28/2020   • Pneumococcal Polysaccharide (PPSV23) 12/28/2020   • Tdap 10/01/2012         REVIEW OF SYSTEMS     Review of Systems   Constitutional: Negative.  Negative for unexpected weight change.   HENT: Negative.    Eyes: Negative.    Respiratory: Positive for apnea (on cpap ).    Cardiovascular: Negative.  Negative for chest pain and palpitations.   Gastrointestinal: Negative.  Negative for blood in stool.   Endocrine: Negative.         On testosterone replacement   Genitourinary: Negative.    Musculoskeletal:        Right knee problem   Skin: Negative.    Allergic/Immunologic: Negative.    Neurological: Negative.    Hematological: Negative.  Does not bruise/bleed easily.   Psychiatric/Behavioral: Negative.          PHYSICAL EXAMINATION     Physical Exam  Constitutional:       General: He is not in acute distress.     Appearance: He is well-developed. He is obese.   HENT:      Head: Normocephalic and atraumatic.      Right Ear: Tympanic membrane, ear canal and external ear normal.      Left Ear: Tympanic membrane, ear canal and external ear normal.   Eyes:      General: No scleral icterus.     Conjunctiva/sclera: Conjunctivae normal.      Pupils: Pupils are equal, round, and reactive to light.   Neck:      Thyroid: No thyroid  mass or thyromegaly.      Vascular: No carotid bruit.      Trachea: No tracheal deviation.   Cardiovascular:      Rate and Rhythm: Normal rate and regular rhythm.      Pulses: Normal pulses.      Heart sounds: Normal heart sounds. No murmur heard.    No gallop.      Comments: No carotid bruit  Pulmonary:      Effort: Pulmonary effort is normal.      Breath sounds: Normal breath sounds. No rales.   Chest:   Breasts:      Right: No axillary adenopathy.      Left: No axillary adenopathy.       Abdominal:      General: There is no distension.      Palpations: Abdomen is soft. There is no mass.      Tenderness: There is no abdominal tenderness.      Hernia: No hernia is present.   Musculoskeletal:         General: Normal range of motion.      Cervical back: Neck supple.      Right lower leg: No edema.      Left lower leg: No edema.   Lymphadenopathy:      Cervical: No cervical adenopathy.      Upper Body:      Right upper body: No axillary adenopathy.      Left upper body: No axillary adenopathy.   Skin:     General: Skin is warm.      Coloration: Skin is not jaundiced or pale.      Findings: No lesion (Negative for suspicious skin lesions/growths) or rash.      Comments: No jaundice  No suspicious skin lesions.    Neurological:      Mental Status: He is alert and oriented to person, place, and time.      Cranial Nerves: No cranial nerve deficit.      Motor: No abnormal muscle tone.   Psychiatric:         Mood and Affect: Mood normal.         Behavior: Behavior normal.         Thought Content: Thought content normal.         Judgment: Judgment normal.         REVIEWED DATA      Labs:    Lab Results   Component Value Date     01/04/2022    K 3.9 01/04/2022    CALCIUM 9.7 01/04/2022    AST 22 12/16/2017    ALT 23 12/16/2017    BUN 15 01/04/2022    CREATININE 0.87 01/04/2022    CREATININE 1.04 09/14/2021    CREATININE 1.14 12/16/2017    EGFRIFNONA 103 01/04/2022       Lab Results   Component Value Date    GLUCOSE 105  (H) 01/04/2022    HGBA1C 4.95 03/12/2018    TSH 2.720 12/27/2020    FREET4 0.94 05/11/2021       Lab Results   Component Value Date    PSA 0.673 03/12/2018       Lab Results   Component Value Date    TESTOSTERONE 321.50 03/12/2018    TESTOSTERONE 582.20 12/16/2017    TESTFRE 15.8 03/12/2018    TESTFRE 24.7 12/16/2017       No results found for: LDL, HDL, TRIG, CHOLHDLRATIO    No components found for: [id number]    Lab Results   Component Value Date    WBC 7.21 01/04/2022    HGB 16.7 01/04/2022    MCV 90.8 01/04/2022     01/04/2022       Lab Results   Component Value Date    GLUCOSEU Negative 12/16/2017    BLOODU Negative 12/16/2017    NITRITEU Negative 12/16/2017    LEUKOCYTESUR Negative 12/16/2017        No results found for: HEPCVIRUSABY    Imaging:           Medical Tests:     Echocardiogram:                           Left ventricle size is normal.                            Overall left ventricular function is normal.                            The estimated ejection fraction was 50 to 55%.                            The ejection fraction biplane was calculated at 60%.                            Abnormal left ventricular diastolic filling consistent with impaired relaxation.                            Structurally normal mitral valve.                            Trace mitral regurgitation is present.                            Tricuspid valve is structurally normal.                            Trace tricuspid regurgitation.                            Right ventricular systolic pressure of 23 mmHg.                            Right ventricular systolic pressure consistent with no pulmonary hypertension.                            Trace pulmonic insufficiency present.         ASSESSMENT & PLAN     ANNUAL WELLNESS EXAM / PHYSICAL     Other medical problems addressed today:  Problem List Items Addressed This Visit    None     Visit Diagnoses     Encounter for annual health examination    -  Primary    Relevant  Orders    CBC & Differential    Comprehensive Metabolic Panel    Hemoglobin A1c    Lipid Panel With / Chol / HDL Ratio    TSH+Free T4    Urinalysis With Microscopic If Indicated (No Culture) - Urine, Clean Catch    HIV-1 / O / 2 Ag / Antibody 4th Generation          Summary/Discussion:     ·     Next Appointment with me: Visit date not found    No follow-ups on file.      HEALTHCARE MAINTENANCE ISSUES       Cancer Screening:  · Colon: Initial/Next screening at age: 46  · Repeat colon cancer screening: N/A at this time  · Prostate: No screening needed at this time  · Testicular: Recommended monthly self exam  · Skin: Monthly self skin examination, annual exam by health professional  · Lung: Does not meet criteria for lung cancer screening.   · Other:    Screening Labs & Tests:  · Lab results reviewed & discussed with with patient or orders placed today.  · EKG:  · CV Screening: Lipid panel  · DEXA (75+ or risk factors):   · HEP C (If born [id number] or risk factors): Previously had negative screen  · Other:     Immunization/Vaccinations (to be given today unless deferred by patient)  · Influenza: Recommended annual influenza vaccine  · Hepatitis A: Verify immunization records  · Hepatitis B: Verify immunization records  · Tetanus/Pertussis: Administer today  · Pneumococcal: Administer today  · Shingles: Not needed at this time  · COVID: Had the primary vaccine series and 1st booster and Recommended the bivalent booster shot  Lifestyle Counseling:  · Lifestyle Modifications: Attempt to lose weight, Maintain a low sugar/carbohydrate diet, Follow a low fat, low cholesterol diet, Maintain low sodium diet (< 3 gm) for blood pressure, Make dinner the lightest meal of day and Discussed sexual issues, safe sex practices, contraception  · Safety Issues: Always wear seatbelt, Avoid texting while driving   · Use sunscreen, regular skin examination  · Recommended annual dental/vision examination.  · Emotional/Stress/Sleep:  Reviewed and  given when appropriate      Health Maintenance   Topic Date Due   • HEPATITIS C SCREENING  Never done   • Pneumococcal Vaccine 0-64 (2 - PCV) 12/28/2021   • COVID-19 Vaccine (4 - Booster for Moderna series) 01/09/2022   • INFLUENZA VACCINE  10/01/2022   • TDAP/TD VACCINES (2 - Td or Tdap) 10/01/2022   • ANNUAL PHYSICAL  09/23/2023           *Examiner was wearing KN95 mask and eye protection during the entire duration of the visit. Patient was masked the entire time. Minimum social distance of 6 ft maintained entire visit except if physical contact was necessary as documented.       Template created by Maged Golden MD

## 2022-09-22 NOTE — PROGRESS NOTES
30-Day / 10-Visit Progress Note         Patient: Shaheen Rivera   : 1990  Diagnosis/ICD-10 Code:  S/P ACL reconstruction [Z98.890]  Referring practitioner: Marta Lal MD  Date of Initial Visit: Type: THERAPY  Noted: 2022  Today's Date: 2022  Patient seen for 35 sessions      Subjective:     Clinical Progress: improved, strength progression is slow at present  Home Program Compliance: Yes  Treatment has included:  therapeutic exercise, manual therapy, therapeutic activity, neuro-muscular retraining , gait training, electrical stimulation , patient education with home exercise program , orthotic fabrication  and dry needling     Subjective my left hip is still really bothering me. R knee buckled again recently going down some stairs, ramps are hard too  Objective          Palpation   Left   Hypertonic in the gluteus medius, iliopsoas, piriformis and TFL.   Tenderness of the gluteus medius, piriformis and TFL.   Trigger point to gluteus medius and TFL.     Right   Hypertonic in the iliopsoas.     Tenderness     Left Hip   Tenderness in the greater trochanter.     Strength/Myotome Testing     Left Hip   Planes of Motion   Flexion: 4+  Abduction: 4  External rotation: 4    Right Hip   Planes of Motion   Flexion: 4+  Abduction: 4  External rotation: 4+    Left Knee   Flexion: 5  Extension: 5    Right Knee   Flexion: 4+    Additional Strength Details  R knee ext with MMT at 90 deg, 45 deg and 0 deg is 4 to 4+/5, but he had poor eccentric control of the knee on uneven ground and stairs due to VMO atrophy and weakness             Exercises:  -SL clam 2x10 B (tactile cues for positioning)  -1.5 inch step downs to L heel touch, 2x10 (tactile/verbal cues for form)  -glute med shift onto 1.5 inch phone book, 2x10 each  -review of lunges and squats  -discussion of progression    Manual: STM to L glute med and TFL with ischemic pressure for trigger point release    Functional Outcome Score:   Knee  Outcome Survey=71.25%    Assessment & Plan     Assessment    Assessment details: Shaheen Rivera has been seen for 35 physical therapy sessions for s/p R ACL reconstruction.  Treatment has included therapeutic exercise, manual therapy, therapeutic activity, neuro-muscular retraining , gait training, electrical stimulation , patient education with home exercise program , orthotic fabrication  and dry needling . Progress to physical therapy goals is fair. Shaheen has good ROM of the knee, but is still struggling with his quad strength, especially with his VMO. He is slowly getting some definition back but has difficulty with eccentric quad control and his knee can buckle on stairs and inclines He will benefit from continued skilled physical therapy to address remaining impairments and functional limitations.     Prognosis: good    Goals  Plan Goals: ST wks  1. Patient will be independent with education for symptom management, joint protection and strategies to minimize stress on affected tissues (MET)   2. Patient will be able to perform a SLR without a quad lag for improved control of his knee (MET)   3. Pt to improve R knee ROM to ext=0 deg and kbghazc=898 deg for improved gait pattern (MET)   4. Pt to ambulate without an AD with no gait deficits (PART MET)  Still with mild antalgia    LT wks  1. Pt to improve R knee ROM to ext=2 deg of HE to ohkvhgr=520 deg for improved gait pattern (ONGOING) (PART MET)   2. Pt to improve score on Knee Outcome Survey from 24% to 90% for overall functional improvement (ONGOING) improved to 71.25%   3. Patient will increase R knee strength to 5/5 to improve functional mobility (ONGOING)   4. Patient will negotiate stairs reciprocally without rail support (ONGOING) (PART MET) for ascending  5. Patient will demonstrate an independent HEP for core and knee strength and flexibility/ROM. (ONGOING)     Plan  Therapy options: will be seen for skilled therapy services  Frequency: 1x  week  Duration in weeks: 10  Treatment plan discussed with: patient           Recommendations: Continue as planned  Timeframe: 3 months  Prognosis to achieve goals: good    PT Signature: Khushi Garcia PT, CDNT    License Number: [id number]    Electronically signed by Khushi Garcia PT, 09/22/22, 8:34 AM EDT      Based upon review of the patient's progress and continued therapy plan, it is my medical opinion that Shaheen Rivera should continue physical therapy treatment at [facility] PHYS[facility] TH[facility]  [address] [place] [address] [address] [place] [address]  [phone].    Signature: __________________________________  Marta Lal MD    Timed:  Manual Therapy:    10     mins  75582;  Therapeutic Exercise:    10     mins  45987;     Neuromuscular Soumya:    10    mins  26835;    Therapeutic Activity:          mins  21836;     Gait Training:           mins  08285;     Ultrasound:          mins  57185;    Iontophoresis         mins 05275;    Untimed:  Electrical Stimulation:         mins  35195 ( );  Traction:         mins  78188;   Dry Needling   (1-2 muscles)            mins 59729 (Self-pay)  Dry Needling (3-4 muscles)             mins 20561 (Self-pay)  Dry Needling Trial                 mins DRYNDLTRIAL  (No Charge)    Timed Treatment:   30   mins   Total Treatment:     30   mins

## 2022-09-23 ENCOUNTER — DOCUMENTATION (OUTPATIENT)
Dept: INTERNAL MEDICINE | Age: 32
End: 2022-09-23

## 2022-09-23 DIAGNOSIS — E78.5 HYPERLIPIDEMIA, UNSPECIFIED HYPERLIPIDEMIA TYPE: Primary | ICD-10-CM

## 2022-09-23 LAB
ALBUMIN SERPL-MCNC: 4.7 G/DL (ref 4–5)
ALBUMIN/GLOB SERPL: 2.1 {RATIO} (ref 1.2–2.2)
ALP SERPL-CCNC: 56 IU/L (ref 44–121)
ALT SERPL-CCNC: 24 IU/L (ref 0–44)
APPEARANCE UR: CLEAR
AST SERPL-CCNC: 25 IU/L (ref 0–40)
BASOPHILS # BLD AUTO: 0.1 X10E3/UL (ref 0–0.2)
BASOPHILS NFR BLD AUTO: 2 %
BILIRUB SERPL-MCNC: 0.6 MG/DL (ref 0–1.2)
BILIRUB UR QL STRIP: NEGATIVE
BUN SERPL-MCNC: 12 MG/DL (ref 6–20)
BUN/CREAT SERPL: 10 (ref 9–20)
CALCIUM SERPL-MCNC: 10 MG/DL (ref 8.7–10.2)
CHLORIDE SERPL-SCNC: 100 MMOL/L (ref 96–106)
CHOLEST SERPL-MCNC: 230 MG/DL (ref 100–199)
CHOLEST/HDLC SERPL: 5.9 RATIO (ref 0–5)
CO2 SERPL-SCNC: 27 MMOL/L (ref 20–29)
COLOR UR: YELLOW
CREAT SERPL-MCNC: 1.24 MG/DL (ref 0.76–1.27)
EGFRCR SERPLBLD CKD-EPI 2021: 80 ML/MIN/1.73
EOSINOPHIL # BLD AUTO: 0.2 X10E3/UL (ref 0–0.4)
EOSINOPHIL NFR BLD AUTO: 4 %
ERYTHROCYTE [DISTWIDTH] IN BLOOD BY AUTOMATED COUNT: 13.1 % (ref 11.6–15.4)
GLOBULIN SER CALC-MCNC: 2.2 G/DL (ref 1.5–4.5)
GLUCOSE SERPL-MCNC: 103 MG/DL (ref 65–99)
GLUCOSE UR QL STRIP: NEGATIVE
HBA1C MFR BLD: 5.5 % (ref 4.8–5.6)
HCT VFR BLD AUTO: 50 % (ref 37.5–51)
HDLC SERPL-MCNC: 39 MG/DL
HGB BLD-MCNC: 17.4 G/DL (ref 13–17.7)
HGB UR QL STRIP: NEGATIVE
HIV 1+2 AB+HIV1 P24 AG SERPL QL IA: NON REACTIVE
IMM GRANULOCYTES # BLD AUTO: 0 X10E3/UL (ref 0–0.1)
IMM GRANULOCYTES NFR BLD AUTO: 0 %
KETONES UR QL STRIP: NEGATIVE
LDLC SERPL CALC-MCNC: 162 MG/DL (ref 0–99)
LEUKOCYTE ESTERASE UR QL STRIP: NEGATIVE
LYMPHOCYTES # BLD AUTO: 2 X10E3/UL (ref 0.7–3.1)
LYMPHOCYTES NFR BLD AUTO: 34 %
MCH RBC QN AUTO: 31.5 PG (ref 26.6–33)
MCHC RBC AUTO-ENTMCNC: 34.8 G/DL (ref 31.5–35.7)
MCV RBC AUTO: 90 FL (ref 79–97)
MICRO URNS: NORMAL
MONOCYTES # BLD AUTO: 0.5 X10E3/UL (ref 0.1–0.9)
MONOCYTES NFR BLD AUTO: 8 %
NEUTROPHILS # BLD AUTO: 3.2 X10E3/UL (ref 1.4–7)
NEUTROPHILS NFR BLD AUTO: 52 %
NITRITE UR QL STRIP: NEGATIVE
PH UR STRIP: 7 [PH] (ref 5–7.5)
PLATELET # BLD AUTO: 228 X10E3/UL (ref 150–450)
POTASSIUM SERPL-SCNC: 4.3 MMOL/L (ref 3.5–5.2)
PROT SERPL-MCNC: 6.9 G/DL (ref 6–8.5)
PROT UR QL STRIP: NEGATIVE
RBC # BLD AUTO: 5.53 X10E6/UL (ref 4.14–5.8)
SODIUM SERPL-SCNC: 140 MMOL/L (ref 134–144)
SP GR UR STRIP: 1.01 (ref 1–1.03)
T4 FREE SERPL-MCNC: 1.18 NG/DL (ref 0.82–1.77)
TRIGL SERPL-MCNC: 157 MG/DL (ref 0–149)
TSH SERPL DL<=0.005 MIU/L-ACNC: 1.58 UIU/ML (ref 0.45–4.5)
UROBILINOGEN UR STRIP-MCNC: 0.2 MG/DL (ref 0.2–1)
VLDLC SERPL CALC-MCNC: 29 MG/DL (ref 5–40)
WBC # BLD AUTO: 6 X10E3/UL (ref 3.4–10.8)

## 2022-09-23 NOTE — PROGRESS NOTES
CALL PATIENT with results:    1. LDL (bad cholesterol) is quite high and HDL (good cholesterol) is low.   - Maintain a low saturated fat & low cholesterol diet.   - continue to work on weight loss     2. Fasting glucose is mildly elevated.   - Maintain a low sugar/starch/carbohydydrate diet.   - weight loss     3. Other labs are within normal range     Plan:     Followup in 6 months with fasting lipids prior to followup

## 2022-10-05 ENCOUNTER — TREATMENT (OUTPATIENT)
Dept: PHYSICAL THERAPY | Facility: CLINIC | Age: 32
End: 2022-10-05

## 2022-10-05 DIAGNOSIS — R26.9 GAIT DISTURBANCE: ICD-10-CM

## 2022-10-05 DIAGNOSIS — G89.29 CHRONIC PAIN OF RIGHT KNEE: ICD-10-CM

## 2022-10-05 DIAGNOSIS — Z98.890 S/P ACL RECONSTRUCTION: Primary | ICD-10-CM

## 2022-10-05 DIAGNOSIS — M25.561 CHRONIC PAIN OF RIGHT KNEE: ICD-10-CM

## 2022-10-05 DIAGNOSIS — R29.898 WEAKNESS OF RIGHT LOWER EXTREMITY: ICD-10-CM

## 2022-10-05 PROCEDURE — 20560 NDL INSJ W/O NJX 1 OR 2 MUSC: CPT | Performed by: PHYSICAL THERAPIST

## 2022-10-05 PROCEDURE — 97110 THERAPEUTIC EXERCISES: CPT | Performed by: PHYSICAL THERAPIST

## 2022-10-05 PROCEDURE — 97140 MANUAL THERAPY 1/> REGIONS: CPT | Performed by: PHYSICAL THERAPIST

## 2022-10-05 PROCEDURE — 97530 THERAPEUTIC ACTIVITIES: CPT | Performed by: PHYSICAL THERAPIST

## 2022-10-05 NOTE — PROGRESS NOTES
Physical Therapy Daily Treatment Note    Patient: Shaheen Rivera   : 1990  Diagnosis/ICD-10 Code:  S/P ACL reconstruction [Z98.890]  Referring practitioner: Marta Lal MD  Date of Initial Visit: Type: THERAPY  Noted: 2022  Today's Date: 10/5/2022  Patient seen for 36 sessions           Subjective still having pain in the knee, buckling (4x since the last time he was in here), now B hips are hurting    Objective          Palpation   Left   Hypertonic in the gluteus medius.   Tenderness of the gluteus medius.   Trigger point to gluteus medius.     Right   Hypertonic in the gluteus medius. Tenderness of the gluteus medius.     Strength/Myotome Testing     Left Hip   Planes of Motion   External rotation: 4    Right Hip   Planes of Motion   External rotation: 4-    Right Knee   Extension: 4- (with knee in full ext, strength 4/5 with knee in 90 deg of flexion)        Exercises:  -SL clam 2x10 B (hand on pelvis to prevent compensation)  -standing glute med hike (off 1-2inch book) x10 each  -piriformis and ITBand stretching 3x20 B    Discussion of what he is doing at the gym, still riding [employer] (unable to hover/stand on the bike), continuing with more eccentric quad exercises to help improve control/reduce buckling    Manual:dry needling, using threading and direct techniques, obtaining verbal consent to treat after discussing benefits and risks. Patient position during treatment: R SL. Muscles treated: L TFL and glute med. Response:  LTRs. Clean needle technique observed at all times, neurovascular structures observed. Manual palpation and assessment performed before, during, and after session.  STM to L gluteals following the needling to improve blood flow and pain reduction    Assessment/Plan  Shaheen does have more definition in the R VMO than he did a month ago. He has been working hard 5x a week at the gym and using his [employer] at home. He is still lacking end range quad strength, which has  caused him to buckle 4 times over the last couple of weeks. He has been compensating with his gait, afraid that the knee will buckle, and now is having B hip/gluteal pain. He does have some weakness of the glut med B and trigger points in the L as well. I think over time his strength will continue to improve, it is just going to take months to get there. Am concerned over the repeated buckling possibly causing injury, but graft feel solid, which was confirmed by MRI.          Timed:    Manual Therapy:    15     mins  59311;  Therapeutic Exercise:    15     mins  16932;     Neuromuscular Soumya:        mins  25547;    Therapeutic Activity:     8     mins  17170;     Gait Training:           mins  08724;     Ultrasound:          mins  16415;    Electrical Stimulation:         mins  01451 ( );  Iontophoresis         mins 02381;  Aquatic Therapy         mins 14469;      Untimed:  Electrical Stimulation:         mins  10074 ( );  Traction:         mins  34426;   Dry Needling   (1-2 muscles)     7        mins 20560 (Self-pay)  Dry Needling (3-4 muscles)           mins 20561 (Self-pay)  Dry Needling Trial              mins DRYNDLTRIAL  (No Charge)    Timed Treatment:   38   mins   Total Treatment:     45   mins    Khushi Garcia PT, CDNT  Physical Therapist    [place] License:[id number]

## 2022-10-12 ENCOUNTER — TREATMENT (OUTPATIENT)
Dept: PHYSICAL THERAPY | Facility: CLINIC | Age: 32
End: 2022-10-12

## 2022-10-12 DIAGNOSIS — M25.561 CHRONIC PAIN OF RIGHT KNEE: ICD-10-CM

## 2022-10-12 DIAGNOSIS — R53.1 STRENGTH LOSS OF: ICD-10-CM

## 2022-10-12 DIAGNOSIS — Z98.890 S/P ACL RECONSTRUCTION: Primary | ICD-10-CM

## 2022-10-12 DIAGNOSIS — R29.898 WEAKNESS OF RIGHT LOWER EXTREMITY: ICD-10-CM

## 2022-10-12 DIAGNOSIS — G89.29 CHRONIC PAIN OF RIGHT KNEE: ICD-10-CM

## 2022-10-12 DIAGNOSIS — R26.9 GAIT DISTURBANCE: ICD-10-CM

## 2022-10-12 PROCEDURE — 20560 NDL INSJ W/O NJX 1 OR 2 MUSC: CPT | Performed by: PHYSICAL THERAPIST

## 2022-10-12 PROCEDURE — 97110 THERAPEUTIC EXERCISES: CPT | Performed by: PHYSICAL THERAPIST

## 2022-10-12 PROCEDURE — 97140 MANUAL THERAPY 1/> REGIONS: CPT | Performed by: PHYSICAL THERAPIST

## 2022-10-12 PROCEDURE — 97530 THERAPEUTIC ACTIVITIES: CPT | Performed by: PHYSICAL THERAPIST

## 2022-10-12 NOTE — PROGRESS NOTES
Physical Therapy Daily Treatment Note    Patient: Shaheen Rivera   : 1990  Diagnosis/ICD-10 Code:  S/P ACL reconstruction [Z98.890]  Referring practitioner: Marta Lal MD  Date of Initial Visit: Type: THERAPY  Noted: 2022  Today's Date: 10/12/2022  Patient seen for 37 sessions           Subjective     Objective   See Exercise, Manual, and Modality Logs for complete treatment.     Exercises:  -SL clam 2x10 B (hand on pelvis to prevent compensation)  -standing glute med hike (off 1-2inch book) x10 each  -4 inch fwd step downs to heel touch, x15 (with SBA in case knee buckled)  -TKE R knee, blue band, 20x 5 sec , working on slow and controlled      Manual:dry needling, using threading and direct techniques, obtaining verbal consent to treat after discussing benefits and risks. Patient position during treatment: R SL. Muscles treated: L piriformis and glute med. Response:  LTRs. Clean needle technique observed at all times, neurovascular structures observed. Manual palpation and assessment performed before, during, and after session.    STM to L gluteals following the needling to improve blood flow and pain reduction    KT taping to the R VMO to help with activation, Y strip    Assessment/Plan  Continuing to work on VMO/quad strength with eccentric load to help with buckling of the knee. He still had L gluteal and hip flexor pain (unable to dry needle hip flexor due to being too ticklish in that area) due to compensation with walking/stairs since the knee can give way periodically.          Timed:    Manual Therapy:    15     mins  87106;  Therapeutic Exercise:    15     mins  95188;     Neuromuscular Soumya:        mins  75580;    Therapeutic Activity:     8     mins  09561;     Gait Training:           mins  60569;     Ultrasound:          mins  40185;    Electrical Stimulation:         mins  80929 ( );  Iontophoresis         mins 33127;  Aquatic Therapy         mins  21548;      Untimed:  Electrical Stimulation:         mins  45692 ( );  Traction:         mins  61509;   Dry Needling   (1-2 muscles)     7        mins 20560 (Self-pay)  Dry Needling (3-4 muscles)           mins 20561 (Self-pay)  Dry Needling Trial              mins DRYNDLTRIAL  (No Charge)    Timed Treatment:   38   mins   Total Treatment:     45   mins    Khushi Garcia PT, CDNT  Physical Therapist    [place] License:[id number]

## 2022-10-19 ENCOUNTER — TREATMENT (OUTPATIENT)
Dept: PHYSICAL THERAPY | Facility: CLINIC | Age: 32
End: 2022-10-19

## 2022-10-19 DIAGNOSIS — G89.29 CHRONIC PAIN OF RIGHT KNEE: ICD-10-CM

## 2022-10-19 DIAGNOSIS — Z98.890 S/P ACL RECONSTRUCTION: Primary | ICD-10-CM

## 2022-10-19 DIAGNOSIS — R53.1 STRENGTH LOSS OF: ICD-10-CM

## 2022-10-19 DIAGNOSIS — M25.561 CHRONIC PAIN OF RIGHT KNEE: ICD-10-CM

## 2022-10-19 DIAGNOSIS — R26.9 GAIT DISTURBANCE: ICD-10-CM

## 2022-10-19 DIAGNOSIS — R29.898 WEAKNESS OF RIGHT LOWER EXTREMITY: ICD-10-CM

## 2022-10-19 PROCEDURE — 97110 THERAPEUTIC EXERCISES: CPT | Performed by: PHYSICAL THERAPIST

## 2022-10-19 PROCEDURE — 97530 THERAPEUTIC ACTIVITIES: CPT | Performed by: PHYSICAL THERAPIST

## 2022-10-19 PROCEDURE — 20560 NDL INSJ W/O NJX 1 OR 2 MUSC: CPT | Performed by: PHYSICAL THERAPIST

## 2022-10-19 PROCEDURE — 97112 NEUROMUSCULAR REEDUCATION: CPT | Performed by: PHYSICAL THERAPIST

## 2022-10-19 NOTE — PROGRESS NOTES
Physical Therapy Daily Treatment Note    Patient: Shaheen Rivera   : 1990  Diagnosis/ICD-10 Code:  S/P ACL reconstruction [Z98.890]  Referring practitioner: Marta Lal MD  Date of Initial Visit: Type: THERAPY  Noted: 2022  Today's Date: 10/19/2022  Patient seen for 38 sessions    [facility] [place] [address]  [phone] (phone)  [phone] (fax)         Subjective gave way 3 times over the weekend    Objective       Exercises:  -SL clam 2x10 B (hand on pelvis to prevent compensation)  -bridge w/ball between knees 2x20  -standing glute med hike (off 1-2inch book) 2x10 each  -2 inch fwd step downs to heel touch, 3x10   -TKE R knee, blue band, 20x 5 sec , working on slow and controlled  -manual resist to knee extension with knee flexed to 90 deg and 45 deg 10x 10 sec, at 0 deg 3x5 sec  -retro walking with cable, 25lb, x6 slow return working on eccentric load      Manual:dry needling, using threading and direct techniques, obtaining verbal consent to treat after discussing benefits and risks. Patient position during treatment: R SL and supine. Muscles treated: L iliacus and glute med. Response:  LTRs. Clean needle technique observed at all times, neurovascular structures observed. Manual palpation and assessment performed before, during, and after session.       Assessment/Plan  Shaheen is still having times with the knee buckling or giving way, never fallen with this. With prolonged standing or walking, the give is smaller than if he is going from sitting to standing (feels more unstable to him). He is getting more definition of the VMO and improved strength with MMT with knee fully extended, improved control with ecc lowering from 2 in step. Continued pain with the L hip, able to dry needle both the hip flexor and glute med today with good LTRs and release.          Timed:    Manual Therapy:         mins  11736;  Therapeutic  Exercise:    20     mins  37352;     Neuromuscular Soumya:    8    mins  03456;    Therapeutic Activity:     10     mins  53260;     Gait Training:           mins  63172;     Ultrasound:          mins  50156;    Electrical Stimulation:         mins  63187 ( );  Iontophoresis         mins 70968;  Aquatic Therapy         mins 67868;      Untimed:  Electrical Stimulation:         mins  03881 ( );  Traction:         mins  74762;   Dry Needling   (1-2 muscles)     7        mins 20560 (Self-pay)  Dry Needling (3-4 muscles)           mins 20561 (Self-pay)  Dry Needling Trial              mins DRYNDLTRIAL  (No Charge)    Timed Treatment:   38   mins   Total Treatment:     45   mins    Khushi Garcia PT, CDNT  Physical Therapist    [place] License:[id number]

## 2022-10-28 ENCOUNTER — TREATMENT (OUTPATIENT)
Dept: PHYSICAL THERAPY | Facility: CLINIC | Age: 32
End: 2022-10-28

## 2022-10-28 DIAGNOSIS — R26.9 GAIT DISTURBANCE: ICD-10-CM

## 2022-10-28 DIAGNOSIS — R53.1 STRENGTH LOSS OF: ICD-10-CM

## 2022-10-28 DIAGNOSIS — Z98.890 S/P ACL RECONSTRUCTION: Primary | ICD-10-CM

## 2022-10-28 DIAGNOSIS — R29.898 WEAKNESS OF RIGHT LOWER EXTREMITY: ICD-10-CM

## 2022-10-28 DIAGNOSIS — G89.29 CHRONIC PAIN OF RIGHT KNEE: ICD-10-CM

## 2022-10-28 DIAGNOSIS — M25.561 CHRONIC PAIN OF RIGHT KNEE: ICD-10-CM

## 2022-10-28 PROCEDURE — 20560 NDL INSJ W/O NJX 1 OR 2 MUSC: CPT | Performed by: PHYSICAL THERAPIST

## 2022-10-28 PROCEDURE — 97110 THERAPEUTIC EXERCISES: CPT | Performed by: PHYSICAL THERAPIST

## 2022-10-28 PROCEDURE — 97530 THERAPEUTIC ACTIVITIES: CPT | Performed by: PHYSICAL THERAPIST

## 2022-10-28 NOTE — PROGRESS NOTES
30-Day / 10-Visit Progress Note         Patient: Shaheen Rivera   : 1990  Diagnosis/ICD-10 Code:  S/P ACL reconstruction [Z98.890]  Referring practitioner: Marta Lal MD  Date of Initial Visit: Type: THERAPY  Noted: 2022  Today's Date: 10/28/2022  Patient seen for 39 sessions    [facility] [place] [address]  [phone] (phone)  [phone] (fax)    Subjective:     Clinical Progress: improved  Home Program Compliance: Yes  Treatment has included:  therapeutic exercise, manual therapy, therapeutic activity, neuro-muscular retraining , gait training, electrical stimulation , patient education with home exercise program , orthotic fabrication  and dry needling     Subjective I have to leave a little early for a work meeting. Needling still helping the hip  Objective          Strength/Myotome Testing     Right Knee   Flexion: 4+    Additional Strength Details  R knee extension:  MMT at 90 deg=4+/5  MMT at 60 deg=4+/5  MMT at 0 deg=4/5        Exercises:  -SL clam 2x10 B (hand on pelvis to prevent compensation)  -bridge w/ball between knees 2x20  -standing glute med hike (off 1-2inch book) 2x10 each  -2 inch fwd step downs to heel touch, 3x10   -TKE R knee, blue band, 20x 5 sec , working on slow and controlled  DEFER  -manual resist to knee extension with knee flexed to 90 deg and 45 deg 10x 10 sec, at 0 deg 3x5 sec   DEFER  -retro walking with cable, 25lb, x6 slow return working on eccentric load    DEFER      Manual:dry needling, using threading and direct techniques, obtaining verbal consent to treat after discussing benefits and risks. Patient position during treatment: R SL and supine. Muscles treated: L iliacus and piriformis. Response:  LTRs. Clean needle technique observed at all times, neurovascular structures observed. Manual palpation and assessment performed before, during, and after session.    Functional Outcome Score:    Knee Outcome Survey=50%    Assessment & Plan     Assessment    Assessment details: Shaheen Rivera has been seen for 39 physical therapy sessions for s/p R ACL reconstruction.  Treatment has included therapeutic exercise, manual therapy, therapeutic activity, neuro-muscular retraining , gait training, electrical stimulation , patient education with home exercise program , orthotic fabrication  and dry needling . Progress to physical therapy goals is good. Shaheen's VMO strength has been slow to return, but he is making gains every few months. His knee ext at 90 and 60 deg of knee flexion are good, still weakness with knee in full extension, causing him to still buckle and give way. The dry needling has been helping his L hip pain that has been caused from his compensatory gait over time.  He will benefit from continued skilled physical therapy to address remaining impairments and functional limitations.     Prognosis: good    Goals  Plan Goals: ST wks  1. Patient will be independent with education for symptom management, joint protection and strategies to minimize stress on affected tissues (MET)   2. Patient will be able to perform a SLR without a quad lag for improved control of his knee (MET)   3. Pt to improve R knee ROM to ext=0 deg and noglaxs=079 deg for improved gait pattern (MET)   4. Pt to ambulate without an AD with no gait deficits (PART MET)  Still with mild antalgia    LT wks  1. Pt to improve R knee ROM to ext=2 deg of HE to hjrbxbe=461 deg for improved gait pattern (ONGOING) (PART MET)   2. Pt to improve score on Knee Outcome Survey from 24% to 90% for overall functional improvement (ONGOING) improved to 71.25%   3. Patient will increase R knee strength to 5/5 to improve functional mobility (ONGOING)   4. Patient will negotiate stairs reciprocally without rail support (ONGOING) (PART MET) for ascending  5. Patient will demonstrate an independent HEP for core and knee strength and  flexibility/ROM. (ONGOING)     Plan  Therapy options: will be seen for skilled therapy services  Frequency: 1x week  Duration in weeks: 8  Treatment plan discussed with: patient           Recommendations: Continue as planned  Timeframe: 2 months  Prognosis to achieve goals: good    PT Signature: Khushi Garcia PT, CDNT    License Number: [id number]    Electronically signed by Khushi Garcia, PT, 10/28/22, 10:34 AM EDT      Based upon review of the patient's progress and continued therapy plan, it is my medical opinion that Shaheen Rivera should continue physical therapy treatment at [facility] TH[facility]AP[facility]  [address] [place] [address]  [phone].    Signature: __________________________________  Marta Lal MD    Timed:  Manual Therapy:         mins  77163;  Therapeutic Exercise:    10     mins  73428;     Neuromuscular Soumya:        mins  18769;    Therapeutic Activity:     10     mins  61063;     Gait Training:           mins  18013;     Ultrasound:          mins  65659;    Iontophoresis         mins 87744;    Untimed:  Electrical Stimulation:         mins  93555 ( );  Traction:         mins  04817;   Dry Needling   (1-2 muscles)     10       mins 20560 (Self-pay)  Dry Needling (3-4 muscles)             mins 20561 (Self-pay)  Dry Needling Trial                 mins DRYNDLTRIAL  (No Charge)    Timed Treatment:   20   mins   Total Treatment:     30   mins

## 2022-11-04 ENCOUNTER — TREATMENT (OUTPATIENT)
Dept: PHYSICAL THERAPY | Facility: CLINIC | Age: 32
End: 2022-11-04

## 2022-11-04 DIAGNOSIS — G89.29 CHRONIC PAIN OF RIGHT KNEE: ICD-10-CM

## 2022-11-04 DIAGNOSIS — Z98.890 S/P ACL RECONSTRUCTION: Primary | ICD-10-CM

## 2022-11-04 DIAGNOSIS — R29.898 WEAKNESS OF RIGHT LOWER EXTREMITY: ICD-10-CM

## 2022-11-04 DIAGNOSIS — R26.9 GAIT DISTURBANCE: ICD-10-CM

## 2022-11-04 DIAGNOSIS — M25.561 CHRONIC PAIN OF RIGHT KNEE: ICD-10-CM

## 2022-11-04 DIAGNOSIS — R53.1 STRENGTH LOSS OF: ICD-10-CM

## 2022-11-04 PROCEDURE — 97530 THERAPEUTIC ACTIVITIES: CPT | Performed by: PHYSICAL THERAPIST

## 2022-11-04 PROCEDURE — 97110 THERAPEUTIC EXERCISES: CPT | Performed by: PHYSICAL THERAPIST

## 2022-11-04 PROCEDURE — 97112 NEUROMUSCULAR REEDUCATION: CPT | Performed by: PHYSICAL THERAPIST

## 2022-11-04 NOTE — PROGRESS NOTES
"Physical Therapy Daily Treatment Note    Patient: Shaheen Rivera   : 1990  Diagnosis/ICD-10 Code:  S/P ACL reconstruction [Z98.890]  Referring practitioner: Marta Lal MD  Date of Initial Visit: Type: THERAPY  Noted: 2022  Today's Date: 2022  Patient seen for 40 sessions    [facility] [place] [address]  [phone] (phone)  [phone] (fax)         Subjective knee is still giving out, \"feels loose as a goose\"    Objective     Exercises:  -SL clam 2x10 B (hand on pelvis to prevent compensation)  -bridge w/ball between knees 2x20  -standing glute med hike (off 1-2inch book) 2x10 each  -2 inch fwd step downs to heel touch, 3x10   -TKE R knee, blue band, 20x 5 sec , working on slow and controlled    -manual resist to knee extension with knee flexed to 90 deg and 45 deg 10x 10 sec, at 0 deg 5x5 sec   each (v/t cues for VMO)  -retro walking with cable, 27.5lb, x6 slow return working on eccentric load            Assessment/Plan  Shaheen still having pain in the knee and buckling (not daily but frequently throughout the week). His buckling is usually after sitting and starting to walk, on inclines or stairs. He also has it occasionally snap back into HE if he stands for a long period of time.          Timed:    Manual Therapy:         mins  27347;  Therapeutic Exercise:    23     mins  65777;     Neuromuscular Soumya:    8    mins  42598;    Therapeutic Activity:      13    mins  00933;     Gait Training:           mins  93804;     Ultrasound:          mins  33820;    Electrical Stimulation:         mins  53973 ( );  Iontophoresis         mins 03403;  Aquatic Therapy         mins 18665;      Untimed:  Electrical Stimulation:         mins  79381 ( );  Traction:         mins  56706;   Dry Needling   (1-2 muscles)             mins  (Self-pay)  Dry Needling (3-4 muscles)           mins  (Self-pay)  Dry Needling Trial  "             mins DRYNDLTRIAL  (No Charge)    Timed Treatment:   44   mins   Total Treatment:     44   mins    Khushi Garcia PT, CDNT  Physical Therapist    [place] License:[id number]

## 2022-11-14 ENCOUNTER — OFFICE VISIT (OUTPATIENT)
Dept: ORTHOPEDIC SURGERY | Facility: CLINIC | Age: 32
End: 2022-11-14

## 2022-11-14 VITALS — TEMPERATURE: 97.6 F | WEIGHT: 253 LBS | HEIGHT: 71 IN | BODY MASS INDEX: 35.42 KG/M2

## 2022-11-14 DIAGNOSIS — S83.511A RUPTURE OF ANTERIOR CRUCIATE LIGAMENT OF RIGHT KNEE, INITIAL ENCOUNTER: Primary | ICD-10-CM

## 2022-11-14 PROCEDURE — 99213 OFFICE O/P EST LOW 20 MIN: CPT | Performed by: ORTHOPAEDIC SURGERY

## 2022-11-14 NOTE — PROGRESS NOTES
"New Knee      Patient: Shaheen Rivera        YOB: 1990    Medical Record Number: [id number]        Chief Complaints: Right knee pain      History of Present Illness: This is a 32-year-old male who is status post ACL reconstruction who is still having weakness and episodes where it gives away quads or are not near where would like him to be at 8 months but I do think he is getting better        Allergies: No Known Allergies    Medications:   Home Medications:  Current Outpatient Medications on File Prior to Visit   Medication Sig   • acyclovir (ZOVIRAX) 200 MG capsule Take 1 capsule by mouth 2 (Two) Times a Day.   • albuterol sulfate  (90 Base) MCG/ACT inhaler Inhale 2 puffs orally every 4 hours as needed for wheezing or shortness of air.   • anastrozole (ARIMIDEX) 1 MG tablet Take 1 mg by mouth Daily.   • B-D 3CC LUER-IGNACIA SYR 76FH5-9/2 22G X 1-1/2\" 3 ML misc    • BD Hypodermic Needle 18G X 1\" misc    • carvedilol (COREG) 12.5 MG tablet Take 1.5 tablets by mouth 2 (Two) Times a Day.   • cetirizine (zyrTEC) 10 MG tablet Take 1 tablet by mouth Every Night.   • fluticasone (FLONASE) 50 MCG/ACT nasal spray Administer 1spray in each nostril twice daily.   • furosemide (LASIX) 40 MG tablet 40 mg 2 (Two) Times a Day.   • hydrOXYzine (ATARAX) 25 MG tablet    • sacubitril-valsartan (ENTRESTO) 49-51 MG tablet Take 1 tablet by mouth 2 (Two) Times a Day.   • spironolactone (ALDACTONE) 25 MG tablet Take 25 mg by mouth Daily.   • Testosterone Cypionate (DEPOTESTOTERONE CYPIONATE) 200 MG/ML injection Inject 100 mg into the appropriate muscle as directed by prescriber.   • tiotropium bromide monohydrate (Spiriva Respimat) 2.5 MCG/ACT aerosol solution inhaler Inhale 2 puffs Daily for 30 days.   • Xarelto 20 MG tablet      No current facility-administered medications on file prior to visit.     Current Medications:  Scheduled Meds:  Continuous Infusions:No current facility-administered medications for this " visit.    PRN Meds:.    Past Medical History:   Diagnosis Date   • ADHD    • Atrial fibrillation (HCC) 12/28/2020   • COVID     Monoclonal Antibody Infusion on 12/28/2021   • COVID-19 vaccine administered    • HSV-2 (herpes simplex virus 2) infection    • Hx monoclonal drug therapy 12/23/2021    [facility]AM-COVID [place]   • Low testosterone in male    • Sleep apnea     CPAP   • Splenic infarct    • Testosterone deficiency    • Vitamin B 12 deficiency         Past Surgical History:   Procedure Laterality Date   • ADENOIDECTOMY     • APPENDECTOMY     • KNEE ACL RECONSTRUCTION Right 1/18/2022    Procedure: KNEE ANTERIOR CRUCIATE LIGAMENT RECONSTRUCTION WITH AUTOGRAFT, RIGHT KNEE ARTHROSCOPY   ;  Surgeon: Marta Lal MD;  Location: [facility] [place];  Service: Orthopedics;  Laterality: Right;   • TONSILLECTOMY          Social History     Occupational History   • 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[employer])   Tobacco Use   • Smoking status: Never   • Smokeless tobacco: Never   Vaping Use   • Vaping Use: Never used   Substance and Sexual Activity   • Alcohol use: Yes     Comment: OCCASIONAL    • Drug use: No     Comment: former casual marijuana use (more than 2 years ago)   • Sexual activity: Defer     Partners: Female      Social History     Social History Narrative   • Not on file        Family History   Problem Relation Age of Onset   • Lung cancer Mother 49        smoker   • Graves' disease Mother    • Lung cancer Father 52        smoker   • Other Brother         prediabetes   • Coronary artery disease Paternal Grandmother    • Thyroid disease Paternal Grandmother    • Suicidality Maternal Uncle         committed suicide   • Malig Hyperthermia Neg Hx    • Prostate cancer Neg Hx    • Colon cancer Neg Hx              Review of Systems:     Review of Systems      Physical Exam: 32 y.o. male  General Appearance:    Alert, cooperative, in no acute distress                 There were no vitals filed for this visit.   Patient is alert  and read ×3 no acute distress appears her above-listed at height weight and age.  Affect is normal respiratory rate is normal unlabored. Heart rate regular rate rhythm, sclera, dentition and hearing are normal for the purpose of this exam.        Ortho Exam exam of his right knee he has no joint line tenderness he stable to varus valgus stressing he has mildly positive Lachman which indicates instability    Procedures               Assessment/Plan: Right knee ACL reconstruction I think his graft is good we do have an MRI which shows that we did do an EMG which shows no neurologic issues I think he just has to get his quad stronger we talked about a brace and we will get him fitted for an ACL brace I have ordered him a custom brace which will decrease pain and provide stability I think a custom brace is indicated for this patient due to the disproportionate size of his thigh and calf dimension this brace is indicated due to knee instability on exam secondary to his ligament injuries

## 2022-12-07 ENCOUNTER — TREATMENT (OUTPATIENT)
Dept: PHYSICAL THERAPY | Facility: CLINIC | Age: 32
End: 2022-12-07

## 2022-12-07 DIAGNOSIS — R53.1 STRENGTH LOSS OF: ICD-10-CM

## 2022-12-07 DIAGNOSIS — G89.29 CHRONIC PAIN OF RIGHT KNEE: ICD-10-CM

## 2022-12-07 DIAGNOSIS — R26.9 GAIT DISTURBANCE: ICD-10-CM

## 2022-12-07 DIAGNOSIS — Z98.890 S/P ACL RECONSTRUCTION: Primary | ICD-10-CM

## 2022-12-07 DIAGNOSIS — M25.561 CHRONIC PAIN OF RIGHT KNEE: ICD-10-CM

## 2022-12-07 DIAGNOSIS — R29.898 WEAKNESS OF RIGHT LOWER EXTREMITY: ICD-10-CM

## 2022-12-07 PROCEDURE — 97110 THERAPEUTIC EXERCISES: CPT | Performed by: PHYSICAL THERAPIST

## 2022-12-07 PROCEDURE — 97140 MANUAL THERAPY 1/> REGIONS: CPT | Performed by: PHYSICAL THERAPIST

## 2022-12-07 PROCEDURE — 20560 NDL INSJ W/O NJX 1 OR 2 MUSC: CPT | Performed by: PHYSICAL THERAPIST

## 2022-12-07 NOTE — PROGRESS NOTES
Re-Assessment / Re-Certification        Patient: Shaheen Rivera   : 1990  Diagnosis/ICD-10 Code:  S/P ACL reconstruction [Z98.890]  Referring practitioner: Marta Lal MD  Date of Initial Visit: Type: THERAPY  Noted: 2022  Today's Date: 2022  Patient seen for 41 sessions    [facility] [place] [address]  [phone] (phone)  [phone] (fax)    Subjective:     Clinical Progress: improved  Home Program Compliance: Yes  Treatment has included:  therapeutic exercise, manual therapy, therapeutic activity, neuro-muscular retraining , gait training, electrical stimulation , patient education with home exercise program , orthotic fabrication  and dry needling     Subjective I do feel like things are slowly getting better    Objective          Strength/Myotome Testing     Right Hip   Planes of Motion   Flexion: 4+  External rotation: 4+    Right Knee   Flexion: 5  Extension: 4+    Additional Strength Details  Still some give in the R quad with MMT in full ext  5/5 with knee flexed to 90 deg and at 60 deg          Exercises:  -SL clam 2x10 B (hand on pelvis to prevent compensation)  -bridge w/ball between knees 2x20  -TKE R knee, blue band, 20x 5 sec , working on slow and controlled    -manual resist to knee extension with knee flexed to 90 deg and 45 deg 10x 10 sec, at 0 deg 5x5 sec   each (v/t cues for VMO)    Manual:dry needling, using threading and direct techniques, obtaining verbal consent to treat after discussing benefits and risks. Patient position during treatment: R SL . Muscles treated: L glute med and piriformis (used 3 0.3.mmx0.75mm needles). Response:  LTRs. Clean needle technique observed at all times, neurovascular structures observed. Manual palpation and assessment performed before, during, and after session.    Tried to do a 4th stick with the dry needling, but pt was hypersensitive today so stopped. Noted redness from  the dry needling site and medial buttock from alcohol swab    STM to L glute med and piriformis post dry needling to improve blood flow to the area, medium pressure, pt in R SL    Functional outcome score:   Knee Outcome Survey=70%      Assessment & Plan     Assessment    Assessment details: Shaheen Rivera has been seen for 41 physical therapy sessions for s/p R ACL reconstruction.  Treatment has included therapeutic exercise, manual therapy, therapeutic activity, neuro-muscular retraining , gait training, ultrasound, electrical stimulation , patient education with home exercise program , orthotic fabrication  and dry needling . Progress to physical therapy goals is good. Since our last session, Shaheen has made some more gains in the R VMO. He visibly has more definition now, more strength in MMT of quad at 90 and 60 deg of flexion, some give still with MMT of quad at 0 deg. Occasional buckling of the knee when out in the community.  He will benefit from continued skilled physical therapy to address remaining impairments and functional limitations.     Prognosis: good    Goals  Plan Goals: ST wks  1. Patient will be independent with education for symptom management, joint protection and strategies to minimize stress on affected tissues (MET)   2. Patient will be able to perform a SLR without a quad lag for improved control of his knee (MET)   3. Pt to improve R knee ROM to ext=0 deg and rzxbzyp=326 deg for improved gait pattern (MET)   4. Pt to ambulate without an AD with no gait deficits (PART MET)  Still with mild antalgia    LT wks  1. Pt to improve R knee ROM to ext=2 deg of HE to afrngfd=740 deg for improved gait pattern (ONGOING) (PART MET)   2. Pt to improve score on Knee Outcome Survey from 24% to 90% for overall functional improvement (ONGOING) improved to 71.25% ( 70% today)  3. Patient will increase R knee strength to 5/5 to improve functional mobility (ONGOING) (PART MET)   4. Patient will  negotiate stairs reciprocally without rail support (ONGOING) (PART MET) for ascending  5. Patient will demonstrate an independent HEP for core and knee strength and flexibility/ROM. (ONGOING)     Plan  Therapy options: will be seen for skilled therapy services  Frequency: 2x week  Duration in weeks: 12  Treatment plan discussed with: patient  Plan details: MD has ordered an ACL brace. Discussed when he should be wearing it           Recommendations: Continue as planned  Timeframe: 3 months  Prognosis to achieve goals: good    PT Signature: Khushi Garcia, PT, CDNT  License: [id number]      Based upon review of the patient's progress and continued therapy plan, it is my medical opinion that Shaheen Rivera should continue physical therapy treatment at [facility] PHYSICAL THERAPY  [address] [place] [address]  [phone].    Signature: __________________________________  Marta Lal MD  [id number]    Timed:  Manual Therapy:    15     mins  77982;  Therapeutic Exercise:    20     mins  66849;     Neuromuscular Soumya:        mins  04873;    Therapeutic Activity:          mins  93273;     Gait Training:           mins  30196;     Ultrasound:          mins  10857;    Electrical Stimulation:         mins  55221 ( );  Iontophoresis         mins 92185;      Untimed:  Electrical Stimulation:         mins  21026 ( );  Mechanical Traction:         mins  32817;   Dry Needling   (1-2 muscles)     10       mins 43861 (Self-pay)  Dry Needling (3-4 muscles)          mins 20561 (Self-pay)  Dry Needling Trial              mins DRYNDLTRIAL  (No Charge)    Timed Treatment:   35   mins   Total Treatment:     45   mins

## 2022-12-20 ENCOUNTER — TREATMENT (OUTPATIENT)
Dept: PHYSICAL THERAPY | Facility: CLINIC | Age: 32
End: 2022-12-20

## 2022-12-20 DIAGNOSIS — M25.561 CHRONIC PAIN OF RIGHT KNEE: ICD-10-CM

## 2022-12-20 DIAGNOSIS — R26.9 GAIT DISTURBANCE: ICD-10-CM

## 2022-12-20 DIAGNOSIS — R53.1 STRENGTH LOSS OF: ICD-10-CM

## 2022-12-20 DIAGNOSIS — G89.29 CHRONIC PAIN OF RIGHT KNEE: ICD-10-CM

## 2022-12-20 DIAGNOSIS — R29.898 WEAKNESS OF RIGHT LOWER EXTREMITY: ICD-10-CM

## 2022-12-20 DIAGNOSIS — Z98.890 S/P ACL RECONSTRUCTION: Primary | ICD-10-CM

## 2022-12-20 PROCEDURE — 97110 THERAPEUTIC EXERCISES: CPT | Performed by: PHYSICAL THERAPIST

## 2022-12-20 PROCEDURE — 97140 MANUAL THERAPY 1/> REGIONS: CPT | Performed by: PHYSICAL THERAPIST

## 2022-12-20 PROCEDURE — 97530 THERAPEUTIC ACTIVITIES: CPT | Performed by: PHYSICAL THERAPIST

## 2022-12-20 NOTE — PROGRESS NOTES
Physical Therapy Daily Treatment Note    Patient: Shaheen Rivera   : 1990  Diagnosis/ICD-10 Code:  S/P ACL reconstruction [Z98.890]  Referring practitioner: Marta Lal MD  Date of Initial Visit: Type: THERAPY  Noted: 2022  Today's Date: 2022  Patient seen for 42 sessions    [facility] Physical Therapy [place] [address] [place] [address]  [phone] (phone)  [phone] (fax)         Subjective  Had my other WC MD appt.     Objective          Passive Range of Motion     Additional Passive Range of Motion Details  Prone knee flexion w/strap  R=105 deg  L=130 deg     General Comments     Knee Comments  Leg girth 10cm above superior pole of patella  L=62.5 cm  R=54.5 cm    Ankle/Foot Comments   Calf girth, 10cm below the tibial tubercle  R=42.3cm  L =44.8cm         Exercises:  -SL clam 2x10 B (hand on pelvis to prevent compensation)  -R SL hip abd 2x10  -bridge w/ball between knees 2x20  -TKE R knee, blue band, 20x 5 sec , working on slow and controlled    -box squats (on mat table) x10  -lunges (some modification with R foot forward), x10 each  -prone quad stretch with strap, 3x30 sec each  -R patellar mobs, medially    Long discussion of progress, continued knee pain      Assessment/Plan  Shaheen continues to work hard on his R LE strength. He visibly has more tone in the R VMO, but did measure his quad girth today and it is still about 3 inches less than the L LE. His R calf girth is about 1 inch smaller than the L. He is still using his [place] 5x a week and has been lifting/gm activities 5x as well. Having him cut back the lifting to 4x a wk to give the leg a little more recovery time as he has been able to increase the load on the R LE. After next visit, plan to see him every other week for progression         Timed:    Manual Therapy:    8     mins  53798;  Therapeutic Exercise:    15     mins  94803;     Neuromuscular Soumya:        mins  80680;     Therapeutic Activity:     20     mins  64010;     Gait Training:           mins  79502;     Ultrasound:          mins  36232;    Electrical Stimulation:         mins  59385 ( );  Iontophoresis         mins 32946;  Aquatic Therapy         mins 69848;      Untimed:  Electrical Stimulation:         mins  77583 ( );  Traction:         mins  28588;   Dry Needling   (1-2 muscles)             mins 20560 (Self-pay)  Dry Needling (3-4 muscles)           mins 20561 (Self-pay)  Dry Needling Trial              mins DRYNDLTRIAL  (No Charge)    Timed Treatment:   43   mins   Total Treatment:     43   mins    Khushi Garcia PT, CDNT  Physical Therapist    [place] License:[id number]

## 2022-12-29 ENCOUNTER — TREATMENT (OUTPATIENT)
Dept: PHYSICAL THERAPY | Facility: CLINIC | Age: 32
End: 2022-12-29

## 2022-12-29 DIAGNOSIS — Z98.890 S/P ACL RECONSTRUCTION: Primary | ICD-10-CM

## 2022-12-29 DIAGNOSIS — R29.898 WEAKNESS OF RIGHT LOWER EXTREMITY: ICD-10-CM

## 2022-12-29 DIAGNOSIS — M25.561 CHRONIC PAIN OF RIGHT KNEE: ICD-10-CM

## 2022-12-29 DIAGNOSIS — R26.9 GAIT DISTURBANCE: ICD-10-CM

## 2022-12-29 DIAGNOSIS — R53.1 STRENGTH LOSS OF: ICD-10-CM

## 2022-12-29 DIAGNOSIS — G89.29 CHRONIC PAIN OF RIGHT KNEE: ICD-10-CM

## 2022-12-29 PROCEDURE — 97110 THERAPEUTIC EXERCISES: CPT | Performed by: PHYSICAL THERAPIST

## 2022-12-29 PROCEDURE — 97530 THERAPEUTIC ACTIVITIES: CPT | Performed by: PHYSICAL THERAPIST

## 2022-12-29 NOTE — PROGRESS NOTES
Physical Therapy Daily Treatment Note    Patient: Shaheen Rivera   : 1990  Diagnosis/ICD-10 Code:  S/P ACL reconstruction [Z98.890]  Referring practitioner: Marta Lal MD  Date of Initial Visit: Type: THERAPY  Noted: 2022  Today's Date: 2022  Patient seen for 43 sessions    [facility] [place] [address]  [phone] (phone)  [phone] (fax)         Subjective I picked up my ACL brace on the , but really unsure of how to put it on correctly    Objective          Passive Range of Motion     Additional Passive Range of Motion Details  R knee flexion in prone=110 deg     General Comments     Knee Comments  Leg girth 10cm above superior pole of patella  L=62.5 cm  R=55.0 cm    Ankle/Foot Comments   Calf girth, 10cm below the tibial tubercle  R=42.3cm  L =44.8cm       Exercises:  -bridge w/ball between knees 2x20  -TKE R knee, blue band, 20x 5 sec , working on slow and controlled  DEFER    -box squats (on mat table) x10  -lunges (some modification with R foot forward), x10 each  -2 inch step downs to L heel touch  -prone quad stretch with strap, 3x30 sec each    Therapeutic activity:  Spent time getting brace into the proper position and adjusted correctly. Pt took pics with his phone to help with self application until he gets used to it.     Assessment/Plan  Shaheen is having decreased instances of the R knee buckling on him. He did get a custom [employer] ACL brace, which we took time today getting it in the proper place. He has gained a little girth in the R quad, calf measurement is the same as 10 days ago. Some improvement with his prone knee PROM into flexion         Timed:    Manual Therapy:         mins  37675;  Therapeutic Exercise:    30     mins  17771;     Neuromuscular Soumya:        mins  73454;    Therapeutic Activity:     15     mins  48857;     Gait Training:           mins  37845;     Ultrasound:          mins   09903;    Electrical Stimulation:         mins  98356 ( );  Iontophoresis         mins 89170;  Aquatic Therapy         mins 80621;      Untimed:  Electrical Stimulation:         mins  66221 ( );  Traction:         mins  94769;   Dry Needling   (1-2 muscles)             mins 20560 (Self-pay)  Dry Needling (3-4 muscles)           mins 20561 (Self-pay)  Dry Needling Trial              mins DRYNDLTRIAL  (No Charge)    Timed Treatment:   45   mins   Total Treatment:     45   mins    Khushi Garcia PT, CDNT  Physical Therapist    [place] License:[id number]

## 2023-01-11 ENCOUNTER — TREATMENT (OUTPATIENT)
Dept: PHYSICAL THERAPY | Facility: CLINIC | Age: 33
End: 2023-01-11
Payer: OTHER MISCELLANEOUS

## 2023-01-11 DIAGNOSIS — Z98.890 S/P ACL RECONSTRUCTION: Primary | ICD-10-CM

## 2023-01-11 DIAGNOSIS — G89.29 CHRONIC PAIN OF RIGHT KNEE: ICD-10-CM

## 2023-01-11 DIAGNOSIS — R53.1 STRENGTH LOSS OF: ICD-10-CM

## 2023-01-11 DIAGNOSIS — M25.561 CHRONIC PAIN OF RIGHT KNEE: ICD-10-CM

## 2023-01-11 DIAGNOSIS — R29.898 WEAKNESS OF RIGHT LOWER EXTREMITY: ICD-10-CM

## 2023-01-11 DIAGNOSIS — R26.9 GAIT DISTURBANCE: ICD-10-CM

## 2023-01-11 PROCEDURE — 97112 NEUROMUSCULAR REEDUCATION: CPT | Performed by: PHYSICAL THERAPIST

## 2023-01-11 PROCEDURE — 97110 THERAPEUTIC EXERCISES: CPT | Performed by: PHYSICAL THERAPIST

## 2023-01-11 PROCEDURE — 97530 THERAPEUTIC ACTIVITIES: CPT | Performed by: PHYSICAL THERAPIST

## 2023-01-11 NOTE — PROGRESS NOTES
30-Day / 10-Visit Progress Note         Patient: Shaheen Rivera   : 1990  Diagnosis/ICD-10 Code:  S/P ACL reconstruction [Z98.890]  Referring practitioner: Marta Lal MD  Date of Initial Visit: Type: THERAPY  Noted: 2022  Today's Date: 2023  Patient seen for 44 sessions    [facility] [place] [address]  [phone] (phone)  [phone] (fax)    Subjective:     Clinical Progress: improved  Home Program Compliance: Yes  Treatment has included:  therapeutic exercise, manual therapy, therapeutic activity, neuro-muscular retraining , gait training, ultrasound, electrical stimulation , patient education with home exercise program , orthotic fabrication  and dry needling     Subjective still having some times of sharp pain in the knee  Objective          Tenderness     Right Knee   Tenderness in the medial joint line.     Passive Range of Motion     Right Knee   Prone flexion: 112 degrees     Strength/Myotome Testing     Right Hip   Planes of Motion   Abduction: 4+  External rotation: 4+    Right Knee   Flexion: 5    Additional Strength Details  R knee ext at:  0 deg=4 to 4+/5  45 deg=5-/5  90 deg=5-/5    Functional Assessment     Comments  Unable to perform a SLSquat on the R LE     General Comments     Knee Comments  Leg girth 10cm above superior pole of patella  L=62.5 cm  R=55.0 cm    Ankle/Foot Comments   Calf girth, 10cm below the tibial tubercle  R=42.3cm  L =44.8cm       Exercises:  -SL clam x20  -mini squat jumps 2x10  -TKE R knee, blue band, 20x 5 sec , working on slow and controlled      -seated hamstring curls, blue band x20  -2 inch glute med hike, x20  -2 inch step downs to L heel touch 2x15  -prone quad stretch with strap, 3x30 sec each  -manual quad stretch in prone with mild distraction of the knee, 2x30 sec  -attempted R SL squat, but unable to perform    Functional Outcome Score:   Knee Outcome  Survey=67.5%    Assessment & Plan     Assessment    Assessment details: Shaheen Rivera has been seen for 44 physical therapy sessions for s/p R ACL reconstruction in 2022.  Treatment has included therapeutic exercise, manual therapy, therapeutic activity, neuro-muscular retraining , gait training, ultrasound, electrical stimulation , patient education with home exercise program , orthotic fabrication  and dry needling . Progress to physical therapy goals is good. Shaheen's VMO has been slow to return, but his quad strength is improving. He reports no knee buckling over the last 2 weeks. He is still having some sharp pains at times in the medial compartment of the knee, less pain in the L hip (gait noticeably less compensated). His knee is just now to the point of starting light plyometrics, but not ready for jogging/running at this point.  He will benefit from continued skilled physical therapy to address remaining impairments and functional limitations.     Prognosis: good    Goals  Plan Goals: ST wks  1. Patient will be independent with education for symptom management, joint protection and strategies to minimize stress on affected tissues (MET)   2. Patient will be able to perform a SLR without a quad lag for improved control of his knee (MET)   3. Pt to improve R knee ROM to ext=0 deg and lscyjfp=808 deg for improved gait pattern (MET)   4. Pt to ambulate without an AD with no gait deficits (PART MET)  Still with mild antalgia    LT wks  1. Pt to improve R knee ROM to ext=2 deg of HE to kkpmrta=391 deg for improved gait pattern (ONGOING) (PART MET)   2. Pt to improve score on Knee Outcome Survey from 24% to 90% for overall functional improvement (ONGOING) improved to 71.25% ( 70% today)  3. Patient will increase R knee strength to 5/5 to improve functional mobility (ONGOING) (PART MET)   4. Patient will negotiate stairs reciprocally without rail support (ONGOING) (PART MET) for ascending  5.  Patient will demonstrate an independent HEP for core and knee strength and flexibility/ROM. (ONGOING)     Plan  Therapy options: will be seen for skilled therapy services  Frequency: 2x month  Duration in weeks: 12  Treatment plan discussed with: patient           Recommendations: Continue as planned  Timeframe: 3 months  Prognosis to achieve goals: good    PT Signature: Khushi Garcia PT, CDNT    License Number: [id number]    Electronically signed by Khushi Garcia PT, 01/11/23, 3:05 PM EST      Based upon review of the patient's progress and continued therapy plan, it is my medical opinion that Shaheen Rivera should continue physical therapy treatment at [facility] PHYSICAL THERAPY  [address]   [place] [address] [phone].    Signature: __________________________________  Marta Lal MD    Timed:  Manual Therapy:         mins  96970;  Therapeutic Exercise:    10     mins  62419;     Neuromuscular Soumya:    8    mins  84238;    Therapeutic Activity:     23     mins  10190;     Gait Training:           mins  40301;     Ultrasound:          mins  06089;    Iontophoresis         mins 46951;    Untimed:  Electrical Stimulation:         mins  55214 (MC );  Traction:         mins  41849;   Dry Needling   (1-2 muscles)            mins 41900 (Self-pay)  Dry Needling (3-4 muscles)             mins 20561 (Self-pay)  Dry Needling Trial                 mins DRYNDLTRIAL  (No Charge)    Timed Treatment:   41   mins   Total Treatment:     41   mins

## 2023-01-26 ENCOUNTER — TREATMENT (OUTPATIENT)
Dept: PHYSICAL THERAPY | Facility: CLINIC | Age: 33
End: 2023-01-26
Payer: OTHER MISCELLANEOUS

## 2023-01-26 ENCOUNTER — TELEPHONE (OUTPATIENT)
Dept: ORTHOPEDIC SURGERY | Facility: CLINIC | Age: 33
End: 2023-01-26
Payer: COMMERCIAL

## 2023-01-26 DIAGNOSIS — R26.9 GAIT DISTURBANCE: ICD-10-CM

## 2023-01-26 DIAGNOSIS — Z98.890 S/P ACL RECONSTRUCTION: Primary | ICD-10-CM

## 2023-01-26 DIAGNOSIS — G89.29 CHRONIC PAIN OF RIGHT KNEE: ICD-10-CM

## 2023-01-26 DIAGNOSIS — R53.1 STRENGTH LOSS OF: ICD-10-CM

## 2023-01-26 DIAGNOSIS — R29.898 WEAKNESS OF RIGHT LOWER EXTREMITY: ICD-10-CM

## 2023-01-26 DIAGNOSIS — M25.561 CHRONIC PAIN OF RIGHT KNEE: ICD-10-CM

## 2023-01-26 DIAGNOSIS — Z98.890 S/P ACL SURGERY: Primary | ICD-10-CM

## 2023-01-26 PROCEDURE — 97110 THERAPEUTIC EXERCISES: CPT | Performed by: PHYSICAL THERAPIST

## 2023-01-26 PROCEDURE — 97530 THERAPEUTIC ACTIVITIES: CPT | Performed by: PHYSICAL THERAPIST

## 2023-01-26 PROCEDURE — 97112 NEUROMUSCULAR REEDUCATION: CPT | Performed by: PHYSICAL THERAPIST

## 2023-01-26 PROCEDURE — 97116 GAIT TRAINING THERAPY: CPT | Performed by: PHYSICAL THERAPIST

## 2023-01-26 NOTE — PROGRESS NOTES
Physical Therapy Daily Treatment Note    Patient: Shaheen Rivera   : 1990  Diagnosis/ICD-10 Code:  S/P ACL reconstruction [Z98.890]  Referring practitioner: Marta Lal MD  Date of Initial Visit: Type: THERAPY  Noted: 2022  Today's Date: 2023  Patient seen for 45 sessions    [facility] [place] [address]  [phone] (phone)  [phone] (fax)         Subjective the knee gave out yesterday when walking in the grass, didn't fall.     Objective        Exercises:  -walking on TM, ramping up speed to 2x 1 min jogs while in the ACL brace for a total of 10 min,   -mini squat jumps 2x10  -TKE R knee, blue band, 20x 5 sec , working on slow and controlled      -seated hamstring curls, blue band x20  -4 inch step downs to L heel touch 2x10  -4 inch lateral step downs, 2x10  -R SL squat (in brace), using swiss ball against wall (L hip into ball) x10 with v/t cues for form, decrease IR of the R hip  -prone quad stretch with strap, 3x30 sec each  -manual quad stretch in prone with mild distraction of the knee, 2x30 sec  -discussion of orthotics bringing in PT who made them.     Assessment/Plan  Started jogging on the TM for this first time today. He had very mild antalgia with the R LE and some R knee pain during the 2nd minute of jogging. Able to do a small SL squat using the large swiss ball on contralateral hip against wall to increase recruitment of the R glute med. Did have some compensation with rotation the trunk to the R.          Timed:    Manual Therapy:         mins  68920;  Therapeutic Exercise:    10     mins  92401;     Neuromuscular Soumya:    12    mins  80529;    Therapeutic Activity:      13    mins  63523;     Gait Training:      10     mins  70489;     Ultrasound:          mins  76879;    Electrical Stimulation:         mins  97990 ( );  Iontophoresis         mins 91181;  Aquatic Therapy         mins  50403;      Untimed:  Electrical Stimulation:         mins  86503 ( );  Traction:         mins  17416;   Dry Needling   (1-2 muscles)             mins 20560 (Self-pay)  Dry Needling (3-4 muscles)           mins 20561 (Self-pay)  Dry Needling Trial              mins DRYNDLTRIAL  (No Charge)    Timed Treatment:  45    mins   Total Treatment:     45   mins    Khushi Garcia PT, CDNT  Physical Therapist    [place] License:[id number]

## 2023-01-26 NOTE — TELEPHONE ENCOUNTER
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  Please put in new order so they can get more WC visits approved.

## 2023-02-10 ENCOUNTER — TREATMENT (OUTPATIENT)
Dept: PHYSICAL THERAPY | Facility: CLINIC | Age: 33
End: 2023-02-10
Payer: OTHER MISCELLANEOUS

## 2023-02-10 DIAGNOSIS — G89.29 CHRONIC PAIN OF RIGHT KNEE: ICD-10-CM

## 2023-02-10 DIAGNOSIS — R26.9 GAIT DISTURBANCE: ICD-10-CM

## 2023-02-10 DIAGNOSIS — R29.898 WEAKNESS OF RIGHT LOWER EXTREMITY: ICD-10-CM

## 2023-02-10 DIAGNOSIS — Z98.890 S/P ACL RECONSTRUCTION: Primary | ICD-10-CM

## 2023-02-10 DIAGNOSIS — M25.561 CHRONIC PAIN OF RIGHT KNEE: ICD-10-CM

## 2023-02-10 DIAGNOSIS — R53.1 STRENGTH LOSS OF: ICD-10-CM

## 2023-02-10 PROCEDURE — 97110 THERAPEUTIC EXERCISES: CPT | Performed by: PHYSICAL THERAPIST

## 2023-02-10 PROCEDURE — 97116 GAIT TRAINING THERAPY: CPT | Performed by: PHYSICAL THERAPIST

## 2023-02-10 NOTE — PROGRESS NOTES
Physical Therapy Daily Treatment Note    Patient: Shaheen Rivera   : 1990  Diagnosis/ICD-10 Code:  S/P ACL reconstruction [Z98.890]  Referring practitioner: Marta Lal MD  Date of Initial Visit: Type: THERAPY  Noted: 2022  Today's Date: 2/10/2023  Patient seen for 46 sessions    [facility] [place] [address]  [phone] (phone)  [phone] (fax)         Subjective knee buckled on me this past week    Objective           General Comments     Knee Comments  Leg girth 10cm above superior pole of patella    R=56.0 cm    Ankle/Foot Comments   Calf girth, 10cm below the tibial tubercle    R=42.5 cm         Exercises:  -walking on TM, ramping up speed to 3 min jog while in the ACL brace for a total of 9 min,   -mini squat jumps 2x10  DEFER  -TKE R knee, blue band, 20x 5 sec , working on slow and controlled      -seated hamstring curls, blue band x20  -4 inch step downs to L heel touch 2x10 DEFER  -4 inch lateral step downs, 2x10  DEFER  -R SL squat (in brace), using swiss ball against wall (L hip into ball) x10 with v/t cues for form, decrease IR of the R hip  -prone quad stretch with strap, 3x30 sec each  -manual quad/hip flexor stretch in supine (w/L SKTC)     Assessment/Plan  Shaheen had an episode of his knee buckling over the past week, no fall associated with it. Had Covid recently and is now getting back into working out again. Improved girth of the R quad and calf. He was able to do 3 min of jogging in his brace on the TM, some twinges of pain at times. Had him stop at 3 min due to starting some gait compensations. Continuing to improve strength and tolerance to higher level activities.          Timed:    Manual Therapy:         mins  61419;  Therapeutic Exercise:    33     mins  80625;     Neuromuscular Soumya:        mins  55785;    Therapeutic Activity:          mins  76466;     Gait Training:      10     mins  14953;      Ultrasound:          mins  33754;    Electrical Stimulation:         mins  11913 ( );  Iontophoresis         mins 74695;  Aquatic Therapy         mins 51542;      Untimed:  Electrical Stimulation:         mins  90372 ( );  Traction:         mins  79720;   Dry Needling   (1-2 muscles)             mins 20560 (Self-pay)  Dry Needling (3-4 muscles)           mins 20561 (Self-pay)  Dry Needling Trial              mins DRYNDLTRIAL  (No Charge)    Timed Treatment:   43   mins   Total Treatment:     43   mins    Khushi Garcia PT, CDNT  Physical Therapist    [place] License:[id number]

## 2023-02-24 ENCOUNTER — TREATMENT (OUTPATIENT)
Dept: PHYSICAL THERAPY | Facility: CLINIC | Age: 33
End: 2023-02-24
Payer: OTHER MISCELLANEOUS

## 2023-02-24 DIAGNOSIS — R53.1 STRENGTH LOSS OF: ICD-10-CM

## 2023-02-24 DIAGNOSIS — M25.561 CHRONIC PAIN OF RIGHT KNEE: ICD-10-CM

## 2023-02-24 DIAGNOSIS — G89.29 CHRONIC PAIN OF RIGHT KNEE: ICD-10-CM

## 2023-02-24 DIAGNOSIS — R29.898 WEAKNESS OF RIGHT LOWER EXTREMITY: ICD-10-CM

## 2023-02-24 DIAGNOSIS — Z98.890 S/P ACL RECONSTRUCTION: Primary | ICD-10-CM

## 2023-02-24 DIAGNOSIS — R26.9 GAIT DISTURBANCE: ICD-10-CM

## 2023-02-24 PROCEDURE — 97530 THERAPEUTIC ACTIVITIES: CPT | Performed by: PHYSICAL THERAPIST

## 2023-02-24 PROCEDURE — 97116 GAIT TRAINING THERAPY: CPT | Performed by: PHYSICAL THERAPIST

## 2023-02-24 PROCEDURE — 97110 THERAPEUTIC EXERCISES: CPT | Performed by: PHYSICAL THERAPIST

## 2023-02-24 NOTE — PROGRESS NOTES
30-Day / 10-Visit Progress Note         Patient: Shaheen Rivera   : 1990  Diagnosis/ICD-10 Code:  S/P ACL reconstruction [Z98.890]  Referring practitioner: Marta Lal MD  Date of Initial Visit: Type: THERAPY  Noted: 2022  Today's Date: 2023  Patient seen for 47 sessions    [facility] [place] [address]  [phone] (phone)  [phone] (fax)    Subjective:     Clinical Progress: improved  Home Program Compliance: Yes  Treatment has included:  therapeutic exercise, manual therapy, therapeutic activity, neuro-muscular retraining , gait training, electrical stimulation , patient education with home exercise program , orthotic fabrication  and dry needling     Subjective was trying to practice a small jump shot - not really playing ball, but the jumping feels awkward, don't want to hurt the knee   Objective          Active Range of Motion     Right Knee   Flexion: 123 degrees   Extension: 2 (of HE) degrees     Strength/Myotome Testing     Right Knee   Flexion: 5  Right knee extension strength: 4+ to 5-     General Comments     Knee Comments  Leg girth 10cm above superior pole of patella  L=62.5 cm  R=55.0 cm    Ankle/Foot Comments   Calf girth, 10cm below the tibial tubercle  R=42.5cm  L =44.8cm       Exercises:  -walking on TM, ramping up speed to 4.6  for 2x 2min jog while in the ACL brace for a total of 13 min,   -side step running drill (in brace) 4x 20 feet  -mini squat jumps 2x10    -heel prop in supine, 2 min  -BFR band (figuring out placement)    Pt bought BFR bands. PT will watch CE video on proper use    Functional Outcome Score:   Knee Outcome Survey=59/80 or 73.75%    Assessment & Plan     Assessment    Assessment details: Shaheen Rivera has been seen for 47 physical therapy sessions for s/p R ACL reconstruction. We are now have sessions every 2 weeks to allow time for quad strengthening.  Treatment has included  therapeutic exercise, manual therapy, therapeutic activity, neuro-muscular retraining , gait training, electrical stimulation , patient education with home exercise program , orthotic fabrication  and dry needling . Progress to physical therapy goals is good. Shaheen had a lot of atrophy of his R VMO after the surgery. He is getting some return of the muscle, but it not back to full function yet. Started light jogging on the TM in his ACL brace, but after about 2 min he is starting to favor the R LE. Trying to integrate more plyometric activities as the knee will allow.  He will benefit from continued skilled physical therapy to address remaining impairments and functional limitations.     Prognosis: good    Goals  Plan Goals: ST wks  1. Patient will be independent with education for symptom management, joint protection and strategies to minimize stress on affected tissues (MET)   2. Patient will be able to perform a SLR without a quad lag for improved control of his knee (MET)   3. Pt to improve R knee ROM to ext=0 deg and wovjcai=930 deg for improved gait pattern (MET)   4. Pt to ambulate without an AD with no gait deficits (MET)     LT wks  1. Pt to improve R knee ROM to ext=2 deg of HE to diihoav=519 deg for improved gait pattern (ONGOING) (PART MET)   2. Pt to improve score on Knee Outcome Survey from 24% to 90% for overall functional improvement (ONGOING) improved to 73.75%   3. Patient will increase R knee strength to 5/5 to improve functional mobility (ONGOING) (PART MET)   4. Patient will negotiate stairs reciprocally without rail support (PART MET)   5. Patient will demonstrate an independent HEP for core and knee strength and flexibility/ROM. (ONGOING)   6. Pt will be able to perform SL squat to 60 deg x10 with no compensation for overall improved strength and stability of the R knee (NEW)    Plan  Therapy options: will be seen for skilled therapy services  Frequency: 2x month  Duration in weeks:  8  Treatment plan discussed with: patient  Plan details: Pt to start light jogging on the TM in his ACL brace, 2x wk for 3x 2 min bursts of jogging           Recommendations: Continue as planned  Timeframe: 2 months  Prognosis to achieve goals: good    PT Signature: Khushi Garcia PT, CDNT    License Number: [id number]    Electronically signed by Khushi Garcia, PT, 02/24/23, 3:11 PM EST      Based upon review of the patient's progress and continued therapy plan, it is my medical opinion that Shaheen Rivera should continue physical therapy treatment at [facility]  [address]   [place] [address]  [phone].    Signature: __________________________________  Marta Lal MD    Timed:  Manual Therapy:         mins  65755;  Therapeutic Exercise:    15     mins  71004;     Neuromuscular Soumya:        mins  32673;    Therapeutic Activity:     15     mins  51927;     Gait Training:      15     mins  31158;     Ultrasound:          mins  44284;    Iontophoresis         mins 92100;    Untimed:  Electrical Stimulation:         mins  57835 ( );  Traction:         mins  47085;   Dry Needling   (1-2 muscles)            mins 20560 (Self-pay)  Dry Needling (3-4 muscles)             mins 20561 (Self-pay)  Dry Needling Trial                 mins DRYNDLTRIAL  (No Charge)    Timed Treatment:   45   mins   Total Treatment:     45   mins

## 2023-03-10 ENCOUNTER — TREATMENT (OUTPATIENT)
Dept: PHYSICAL THERAPY | Facility: CLINIC | Age: 33
End: 2023-03-10
Payer: OTHER MISCELLANEOUS

## 2023-03-10 DIAGNOSIS — Z98.890 S/P ACL RECONSTRUCTION: Primary | ICD-10-CM

## 2023-03-10 DIAGNOSIS — R53.1 STRENGTH LOSS OF: ICD-10-CM

## 2023-03-10 DIAGNOSIS — M25.561 CHRONIC PAIN OF RIGHT KNEE: ICD-10-CM

## 2023-03-10 DIAGNOSIS — G89.29 CHRONIC PAIN OF RIGHT KNEE: ICD-10-CM

## 2023-03-10 DIAGNOSIS — R29.898 WEAKNESS OF RIGHT LOWER EXTREMITY: ICD-10-CM

## 2023-03-10 DIAGNOSIS — R26.9 GAIT DISTURBANCE: ICD-10-CM

## 2023-03-10 PROCEDURE — 97110 THERAPEUTIC EXERCISES: CPT | Performed by: PHYSICAL THERAPIST

## 2023-03-10 PROCEDURE — 97530 THERAPEUTIC ACTIVITIES: CPT | Performed by: PHYSICAL THERAPIST

## 2023-03-10 NOTE — PROGRESS NOTES
Physical Therapy Daily Treatment Note    Patient: Shaheen Rivera   : 1990  Diagnosis/ICD-10 Code:  S/P ACL reconstruction [Z98.890]  Referring practitioner: Marta Lal MD  Date of Initial Visit: Type: THERAPY  Noted: 2022  Today's Date: 3/10/2023  Patient seen for 48 sessions    [facility] [place] [address] [place] [address]  [phone] (phone)  [phone] (fax)         Subjective I have been having some increased knee pain over the past week or so.     Objective          Observations     Right Knee   Positive for edema (mild).       Tenderness     Right Knee   Tenderness in the medial joint line and patellar tendon.     Tests     Right Knee   Negative anterior drawer.         Exercises:  -walking on TM (warmed up for 10 min prior to therapy)  -ramping up speed to start jogging, pt was favoring the R LE, so stopped for today  -Bettie SL press in brace, 200lb x20  -2 inch step down to L heel touch, 3x10 sec (some compensation with IR moment at the R hip)  -clock standing on 2 inch book - 12, 9, 6, 9, 12  (x6 reps)  -STS w/arms across chest,for one minute from chair. Completed 25 reps, 13 in the first 30 second  -glute med SL squats with swiss ball at L hip and wall, 2x10  -manual stretching in supine to R hip flexor and quad, 6x 30 sec    Assessment/Plan  Pt is having more irritation of the R knee this week. He does have some mild edema with tenderness over there medial joint line and the patellar tendon. Having him back off of some of the plyometrics and jogging today in the clinic. Ok to continue with [place], leg press, hamstring curls, step ups (fwd and lateral). Get back to icing after activity. Recommended that he try OTC Voltarin topically for pain and edema reduction         Timed:    Manual Therapy:         mins  12985;  Therapeutic Exercise:    20     mins  18988;     Neuromuscular Soumya:        mins  10134;    Therapeutic Activity:     23      mins  06848;     Gait Training:           mins  09994;     Ultrasound:          mins  81671;    Electrical Stimulation:         mins  15763 ( );  Iontophoresis         mins 75158;  Aquatic Therapy         mins 00730;      Untimed:  Electrical Stimulation:         mins  48568 ( );  Traction:         mins  65759;   Dry Needling   (1-2 muscles)             mins 20560 (Self-pay)  Dry Needling (3-4 muscles)           mins 20561 (Self-pay)  Dry Needling Trial              mins DRYNDLTRIAL  (No Charge)    Timed Treatment:   43   mins   Total Treatment:     43   mins    Khushi Garcia PT, CDNT  Physical Therapist    [place] License:[id number]

## 2023-03-17 DIAGNOSIS — E78.5 HYPERLIPIDEMIA, UNSPECIFIED HYPERLIPIDEMIA TYPE: ICD-10-CM

## 2023-03-21 LAB
CHOLEST SERPL-MCNC: 134 MG/DL (ref 0–200)
CHOLEST/HDLC SERPL: 3.72 {RATIO}
HCV IGG SERPL QL IA: NON REACTIVE
HDLC SERPL-MCNC: 36 MG/DL (ref 40–60)
LDLC SERPL CALC-MCNC: 78 MG/DL (ref 0–100)
TRIGL SERPL-MCNC: 110 MG/DL (ref 0–150)
VLDLC SERPL CALC-MCNC: 20 MG/DL (ref 5–40)

## 2023-03-22 ENCOUNTER — OFFICE VISIT (OUTPATIENT)
Dept: INTERNAL MEDICINE | Age: 33
End: 2023-03-22
Payer: COMMERCIAL

## 2023-03-22 VITALS
HEART RATE: 63 BPM | HEIGHT: 71 IN | OXYGEN SATURATION: 96 % | WEIGHT: 248 LBS | DIASTOLIC BLOOD PRESSURE: 60 MMHG | SYSTOLIC BLOOD PRESSURE: 100 MMHG | TEMPERATURE: 97.3 F | BODY MASS INDEX: 34.72 KG/M2

## 2023-03-22 DIAGNOSIS — G47.33 OSA ON CPAP: Chronic | ICD-10-CM

## 2023-03-22 DIAGNOSIS — E29.1 HYPOGONADISM IN MALE: Chronic | ICD-10-CM

## 2023-03-22 DIAGNOSIS — Z99.89 OSA ON CPAP: Chronic | ICD-10-CM

## 2023-03-22 DIAGNOSIS — E53.8 VITAMIN B 12 DEFICIENCY: Chronic | ICD-10-CM

## 2023-03-22 DIAGNOSIS — I48.0 PAROXYSMAL ATRIAL FIBRILLATION: Chronic | ICD-10-CM

## 2023-03-22 DIAGNOSIS — Z00.00 ENCOUNTER FOR ANNUAL HEALTH EXAMINATION: ICD-10-CM

## 2023-03-22 DIAGNOSIS — E78.5 HYPERLIPIDEMIA, UNSPECIFIED HYPERLIPIDEMIA TYPE: Primary | ICD-10-CM

## 2023-03-22 PROBLEM — I48.91 ATRIAL FIBRILLATION: Chronic | Status: ACTIVE | Noted: 2021-01-06

## 2023-03-22 RX ORDER — ATORVASTATIN CALCIUM 20 MG/1
TABLET, FILM COATED ORAL
COMMUNITY
Start: 2022-09-26 | End: 2023-03-22 | Stop reason: ALTCHOICE

## 2023-03-22 RX ORDER — ROSUVASTATIN CALCIUM 10 MG/1
10 TABLET, COATED ORAL NIGHTLY
COMMUNITY
Start: 2023-03-10

## 2023-03-22 RX ORDER — CYANOCOBALAMIN (VITAMIN B-12) 100 MCG
1 TABLET ORAL DAILY
COMMUNITY
Start: 2023-03-22

## 2023-03-22 NOTE — ASSESSMENT & PLAN NOTE
Previously started rosuvastatin by cardiology.  LDL cholesterol is significantly improved.  Continue rosuvastatin 10 mg.    Lab Results   Component Value Date    LDL 78 03/20/2023     (H) 09/22/2022     Lab Results   Component Value Date    HDL 36 (L) 03/20/2023    HDL 39 (L) 09/22/2022     Lab Results   Component Value Date    TRIG 110 03/20/2023    TRIG 157 (H) 09/22/2022     Lab Results   Component Value Date    CHOLHDLRATIO 3.72 03/20/2023    CHOLHDLRATIO 5.9 (H) 09/22/2022

## 2023-03-22 NOTE — ASSESSMENT & PLAN NOTE
Clinically stable.  Continue Xarelto for anticoagulation along with carvedilol 12.5 mg 1-1/2 tablets twice daily.  Continue follow-up with cardiology.

## 2023-03-22 NOTE — PROGRESS NOTES
I N T E R N A L  M E D I C I N E    J U N O H  K I M,  M D      ENCOUNTER DATE:  03/22/2023    Shaheen Rivera / 32 y.o. / male    CHIEF COMPLAINT / REASON FOR OFFICE VISIT     Atrial Fibrillation and high LDL, low HDL       ASSESSMENT & PLAN     Problem List Items Addressed This Visit        High    Hyperlipidemia - Primary (Chronic)    Current Assessment & Plan     Previously started rosuvastatin by cardiology.  LDL cholesterol is significantly improved.  Continue rosuvastatin 10 mg.    Lab Results   Component Value Date    LDL 78 03/20/2023     (H) 09/22/2022     Lab Results   Component Value Date    HDL 36 (L) 03/20/2023    HDL 39 (L) 09/22/2022     Lab Results   Component Value Date    TRIG 110 03/20/2023    TRIG 157 (H) 09/22/2022     Lab Results   Component Value Date    CHOLHDLRATIO 3.72 03/20/2023    CHOLHDLRATIO 5.9 (H) 09/22/2022             Relevant Medications    rosuvastatin (CRESTOR) 10 MG tablet       Medium    Vitamin B 12 deficiency (Chronic)    Current Assessment & Plan     Has not been taking shots for awhile now.   Recommended B12 1000 mcg SL daily.           Relevant Medications    Cyanocobalamin (B-12-SL) 1000 MCG sublingual tablet    GOKUL on CPAP (Chronic)    Overview     Continue CPAP.          Atrial fibrillation (HCC) (Chronic)    Current Assessment & Plan     Clinically stable.  Continue Xarelto for anticoagulation along with carvedilol 12.5 mg 1-1/2 tablets twice daily.  Continue follow-up with cardiology.         Relevant Medications    carvedilol (COREG) 12.5 MG tablet    sacubitril-valsartan (ENTRESTO) 49-51 MG tablet       Low    Hypogonadism in male (Chronic)    Overview     Continue with Dr. Salgado and testosterone injection.         Other Visit Diagnoses     Encounter for annual health examination        Relevant Orders    CBC & Differential    Comprehensive Metabolic Panel    Lipid Panel With / Chol / HDL Ratio    TSH+Free T4    Urinalysis With Microscopic If Indicated  "(No Culture) - Urine, Clean Catch    Hemoglobin A1c        Orders Placed This Encounter   Procedures   • Comprehensive Metabolic Panel   • Lipid Panel With / Chol / HDL Ratio   • TSH+Free T4   • Urinalysis With Microscopic If Indicated (No Culture) - Urine, Clean Catch   • Hemoglobin A1c   • CBC & Differential     New Medications Ordered This Visit   Medications   • Cyanocobalamin (B-12-SL) 1000 MCG sublingual tablet     Sig: Place 1 tablet under the tongue Daily.       SUMMARY/DISCUSSION  •       Next Appointment with me: Visit date not found    Return in about 6 months (around 9/22/2023) for ANNUAL PHYSICAL, FASTING LABS 1 WEEK PRIOR TO FOLLOWUP.      VITAL SIGNS     Vitals:    03/22/23 0841   BP: 100/60   Pulse: 63   Temp: 97.3 °F (36.3 °C)   SpO2: 96%   Weight: 112 kg (248 lb)   Height: 180.3 cm (71\")       BP Readings from Last 3 Encounters:   03/22/23 100/60   09/22/22 100/60   04/05/22 100/70     Wt Readings from Last 3 Encounters:   03/22/23 112 kg (248 lb)   11/14/22 115 kg (253 lb)   09/22/22 115 kg (254 lb)     Body mass index is 34.59 kg/m².    Blood pressure readings recorded on patient flowsheet:  No flowsheet data found.       MEDICATIONS AT THE TIME OF OFFICE VISIT     Current Outpatient Medications on File Prior to Visit   Medication Sig   • acyclovir (ZOVIRAX) 200 MG capsule Take 1 capsule by mouth 2 (Two) Times a Day.   • albuterol sulfate  (90 Base) MCG/ACT inhaler Inhale 2 puffs orally every 4 hours as needed for wheezing or shortness of air.   • anastrozole (ARIMIDEX) 1 MG tablet Take 1 tablet by mouth Daily.   • B-D 3CC LUER-IGNACIA SYR 73HQ6-6/2 22G X 1-1/2\" 3 ML misc    • BD Hypodermic Needle 18G X 1\" misc    • carvedilol (COREG) 12.5 MG tablet Take 1.5 tablets by mouth 2 (Two) Times a Day.   • cetirizine (zyrTEC) 10 MG tablet Take 1 tablet by mouth Every Night.   • fluticasone (FLONASE) 50 MCG/ACT nasal spray Administer 1spray in each nostril twice daily.   • furosemide (LASIX) 40 MG " tablet 1 tablet 2 (Two) Times a Day.   • rosuvastatin (CRESTOR) 10 MG tablet Take 1 tablet by mouth Every Night.   • sacubitril-valsartan (ENTRESTO) 49-51 MG tablet Take 1 tablet by mouth 2 (Two) Times a Day.   • spironolactone (ALDACTONE) 25 MG tablet Take 1 tablet by mouth Daily.   • Testosterone Cypionate (DEPOTESTOTERONE CYPIONATE) 200 MG/ML injection Inject 100 mg into the appropriate muscle as directed by prescriber.   • Xarelto 20 MG tablet 1 tablet Daily With Dinner.       HISTORY OF PRESENT ILLNESS     He was started on rosuvastatin 10 mg by his cardiologist previously.    LDL cholesterol was notably lower.  Denies significant side effects.  Denies increased heart palpitations or any chest pain symptoms related to paroxysmal atrial fibrillation.  He is his cardiologist and is on Xarelto carvedilol.  He is on Entresto and spironolactone along with furosemide and carvedilol for heart failure.  He denies increased dyspnea on exertion, PND/orthopnea or edema.  Patient reports that he has not been taking his B12 regularly.  He sees his urologist for testosterone replacement therapy for hypogonadism.  He reports compliance with CPAP for sleep apnea.      Patient Care Team:  Lico Golden MD as PCP - General (Internal Medicine)  Kim Mcmillan MD as Consulting Physician (Cardiology)  Chacorta Salgado Jr., MD as Consulting Physician (Urology)  Sid Sue MD as Consulting Physician (Cardiology)  Marta Lal MD as Surgeon (Orthopedic Surgery)    REVIEW OF SYSTEMS     Review of Systems       PHYSICAL EXAMINATION     Physical Exam  General: No acute distress  Psych: Normal thought and judgment   Cardiovascular Rate: normal. Rhythm: regular. Heart sounds: normal   Pulm/Chest: Effort normal, breath sounds normal.   Right knee in brace       REVIEWED DATA     Labs:     Lab Results   Component Value Date     09/22/2022    K 4.3 09/22/2022    CALCIUM 10.0 09/22/2022    AST 25 09/22/2022    ALT 24  09/22/2022    BUN 12 09/22/2022    CREATININE 1.24 09/22/2022    CREATININE 0.87 01/04/2022    CREATININE 1.04 09/14/2021    EGFRIFNONA 103 01/04/2022       Lab Results   Component Value Date    HGBA1C 5.5 09/22/2022    HGBA1C 4.95 03/12/2018       Lab Results   Component Value Date    LDL 78 03/20/2023     (H) 09/22/2022    HDL 36 (L) 03/20/2023    HDL 39 (L) 09/22/2022    TRIG 110 03/20/2023    TRIG 157 (H) 09/22/2022       Lab Results   Component Value Date    TSH 1.580 09/22/2022    TSH 2.720 12/27/2020    TSH 2.520 03/12/2018    FREET4 1.18 09/22/2022    FREET4 0.94 05/11/2021    FREET4 1.09 03/12/2018       Lab Results   Component Value Date    WBC 6.0 09/22/2022    HGB 17.4 09/22/2022     09/22/2022       No results found for: MALBCRERATIO       Imaging:           Medical Tests:           Summary of old records / correspondence / consultant report:           Request outside records:             *Examiner was wearing KN95 mask during the entire duration of the visit. Patient was masked the entire time. Minimum social distance of 6 ft maintained entire visit except if physical contact was necessary as documented.       Template created by Maged Golden MD

## 2023-03-23 NOTE — PROGRESS NOTES
MyChart:    Here are the result(s) of your test(s):     As discussed in office LDL cholesterol is much better. Continue same.       Please do not hesitate to contact me if you have questions.

## 2023-03-24 ENCOUNTER — TREATMENT (OUTPATIENT)
Dept: PHYSICAL THERAPY | Facility: CLINIC | Age: 33
End: 2023-03-24
Payer: OTHER MISCELLANEOUS

## 2023-03-24 DIAGNOSIS — R53.1 STRENGTH LOSS OF: ICD-10-CM

## 2023-03-24 DIAGNOSIS — M25.561 CHRONIC PAIN OF RIGHT KNEE: ICD-10-CM

## 2023-03-24 DIAGNOSIS — G89.29 CHRONIC PAIN OF RIGHT KNEE: ICD-10-CM

## 2023-03-24 DIAGNOSIS — R29.898 WEAKNESS OF RIGHT LOWER EXTREMITY: ICD-10-CM

## 2023-03-24 DIAGNOSIS — Z98.890 S/P ACL RECONSTRUCTION: Primary | ICD-10-CM

## 2023-03-24 DIAGNOSIS — R26.9 GAIT DISTURBANCE: ICD-10-CM

## 2023-03-24 PROCEDURE — 97140 MANUAL THERAPY 1/> REGIONS: CPT | Performed by: PHYSICAL THERAPIST

## 2023-03-24 PROCEDURE — 97035 APP MDLTY 1+ULTRASOUND EA 15: CPT | Performed by: PHYSICAL THERAPIST

## 2023-03-24 PROCEDURE — 97110 THERAPEUTIC EXERCISES: CPT | Performed by: PHYSICAL THERAPIST

## 2023-03-24 NOTE — PROGRESS NOTES
Re-Assessment / Re-Certification        Patient: Shaheen Rivera   : 1990  Diagnosis/ICD-10 Code:  S/P ACL reconstruction [Z98.890]  Referring practitioner: Marta Lal MD  Date of Initial Visit: Type: THERAPY  Noted: 2022  Today's Date: 3/24/2023  Patient seen for 49 sessions    [facility] [place] [address]  [phone] (phone)  [phone] (fax)    Subjective:     Clinical Progress: unchanged, flare up over the last 2 weeks  Home Program Compliance: Yes  Treatment has included:  therapeutic exercise, manual therapy, therapeutic activity, neuro-muscular retraining , gait training, ultrasound, electrical stimulation , patient education with home exercise program , orthotic fabrication  and dry needling     Subjective the knee has really been bothering me over the last couple of weeks, have backed off of my gym exercises, still using the [place] bike  Objective          Tenderness     Right Knee   Tenderness in the lateral joint line and medial joint line.     Passive Range of Motion     Right Knee   Prone flexion: 110 degrees   Extension: 2 (of HE) degrees     Strength/Myotome Testing     Right Hip   Planes of Motion   Flexion: 4+  Right hip abductors strength: 5-/5.  Adduction: 5  Right hip external rotation strength: 5-/5.    Right Knee   Flexion: 5  Right knee extension strength:  4 to 4+/5 painful today.    Tests     Right Knee   Positive Apley's compression (some pain laterally), Apley's distraction (decreased pain), bounce home and lateral Kasia (some pain laterally).   Negative anterior drawer, anterior Lachman and medial Kasia.      General Comments     Knee Comments  Leg girth 10cm above superior pole of patella  L=62.5 cm  R=55.0 cm    Ankle/Foot Comments   Ankle/Foot Comments   Calf girth, 10cm below the tibial tubercle  R=42.8cm  L =44.8cm     See modality flowsheet    Exercises:  -manual stretching in supine to R hip  flexor and quad, 6x 30 sec  -bridge x10  -bridge with clam, blue band, 2x10  -hip abd/ER w.blue band, 2x10 B and x10 single leg  -hamstring and calf stretching 3x20 sec    Functional outcome score:   Knee Outcome Survey=45/80 or 56.25%    Pt has had an interest in BFRT. I did watch a seminar this week and discussed what I learned from this. I am also looking into another seminar to further increase my knowledge of the subject.     Assessment & Plan     Assessment    Assessment details: Shaheen Rivera has been seen for 49 physical therapy sessions for s/p R ACL reconstruction in .  Treatment has included therapeutic exercise, manual therapy, therapeutic activity, neuro-muscular retraining , gait training, ultrasound, electrical stimulation , patient education with home exercise program , orthotic fabrication  and dry needling . Progress to physical therapy goals is fair. Shaheen has struggled with his quad strength since the surgery. He had been doing better with his strength and function, but over the last couple of weeks his knee has started to buckle again and has more pain. He has tenderness over the lat>medial joint lines and some pain with Kasia's and Apley's compression in the lateral meniscus. His ACL graft is solid. He has been using Voltarin cream several times a day for pain and icing. I am having him back off of his WBing exercise for about a week, will check in and see how he is doing next week.  He will benefit from continued skilled physical therapy to address remaining impairments and functional limitations.     Prognosis: good    Goals  Plan Goals: ST wks  1. Patient will be independent with education for symptom management, joint protection and strategies to minimize stress on affected tissues (MET)   2. Patient will be able to perform a SLR without a quad lag for improved control of his knee (MET)   3. Pt to improve R knee ROM to ext=0 deg and mzfcswe=879 deg for improved gait  pattern (MET)   4. Pt to ambulate without an AD with no gait deficits (MET)     LT wks  1. Pt to improve R knee ROM to ext=2 deg of HE to nzxyidf=670 deg for improved gait pattern (ONGOING) (PART MET)   2. Pt to improve score on Knee Outcome Survey from 24% to 90% for overall functional improvement (ONGOING) improved to 56.25%   3. Patient will increase R knee strength to 5/5 to improve functional mobility (ONGOING) (PART MET)   4. Patient will negotiate stairs reciprocally without rail support (PART MET) for ascending  5. Patient will demonstrate an independent HEP for core and knee strength and flexibility/ROM. (ONGOING)   6. Pt will be able to perform SL squat to 60 deg x10 with no compensation for overall improved strength and stability of the R knee (ONGOING)     Plan  Therapy options: will be seen for skilled therapy services  Frequency: 2x month  Duration in weeks: 12  Treatment plan discussed with: patient           Recommendations: Continue as planned  Timeframe: 3 months  Prognosis to achieve goals: good    PT Signature: Khushi Garcia PT, CDNT  License: [id number]      Based upon review of the patient's progress and continued therapy plan, it is my medical opinion that Shaheen Rivera should continue physical therapy treatment at [facility]  [address] [place] [address]  [phone].    Signature: __________________________________  Marta Lal MD  [id number]    Timed:  Manual Therapy:    10     mins  12885;  Therapeutic Exercise:    25     mins  62451;     Neuromuscular Soumya:        mins  71160;    Therapeutic Activity:          mins  40826;     Gait Training:           mins  58643;     Ultrasound:     10     mins  84811;    Electrical Stimulation:         mins  57229 ( );  Iontophoresis         mins 66360;      Untimed:  Electrical Stimulation:         mins  74460 ( );  Mechanical Traction:         mins  76267;    Dry Needling   (1-2 muscles)            mins 20560 (Self-pay)  Dry Needling (3-4 muscles)          mins 20561 (Self-pay)  Dry Needling Trial              mins DRYNDLTRIAL  (No Charge)    Timed Treatment:   45   mins   Total Treatment:     45   mins

## 2023-03-28 ENCOUNTER — OFFICE VISIT (OUTPATIENT)
Dept: ORTHOPEDIC SURGERY | Facility: CLINIC | Age: 33
End: 2023-03-28
Payer: OTHER MISCELLANEOUS

## 2023-03-28 VITALS — WEIGHT: 258.6 LBS | TEMPERATURE: 97.6 F | BODY MASS INDEX: 36.2 KG/M2 | HEIGHT: 71 IN

## 2023-03-28 DIAGNOSIS — R52 PAIN: Primary | ICD-10-CM

## 2023-03-28 DIAGNOSIS — Z98.890 HISTORY OF REPAIR OF ACL: ICD-10-CM

## 2023-03-28 RX ADMIN — LIDOCAINE HYDROCHLORIDE 2 ML: 10 INJECTION, SOLUTION EPIDURAL; INFILTRATION; INTRACAUDAL; PERINEURAL at 13:46

## 2023-03-28 RX ADMIN — METHYLPREDNISOLONE ACETATE 80 MG: 80 INJECTION, SUSPENSION INTRA-ARTICULAR; INTRALESIONAL; INTRAMUSCULAR; SOFT TISSUE at 13:46

## 2023-03-28 NOTE — PROGRESS NOTES
"  Patient: Shaheen Rivera  YOB: 1990  Date of Service: 3/28/2023    Chief Complaints: Right knee pain    Subjective:    History of Present Illness: Pt is seen in the office today with complaints of right knee pain he is status post ACL reconstruction he was finally getting over the hump finally getting stronger has been doing a lot of exercises still riding his palatine quads are definitely improving but he started to have pain initially it seems anterior some anterior medial some over the patellar tendon no particular history of injury that he can recall.        Allergies: No Known Allergies    Medications:   Home Medications:  Current Outpatient Medications on File Prior to Visit   Medication Sig   • acyclovir (ZOVIRAX) 200 MG capsule Take 1 capsule by mouth 2 (Two) Times a Day.   • albuterol sulfate  (90 Base) MCG/ACT inhaler Inhale 2 puffs orally every 4 hours as needed for wheezing or shortness of air.   • anastrozole (ARIMIDEX) 1 MG tablet Take 1 tablet by mouth Daily.   • B-D 3CC LUER-IGNACIA SYR 29OA5-4/2 22G X 1-1/2\" 3 ML misc    • BD Hypodermic Needle 18G X 1\" misc    • carvedilol (COREG) 12.5 MG tablet Take 1.5 tablets by mouth 2 (Two) Times a Day.   • cetirizine (zyrTEC) 10 MG tablet Take 1 tablet by mouth Every Night.   • Cyanocobalamin (B-12-SL) 1000 MCG sublingual tablet Place 1 tablet under the tongue Daily.   • fluticasone (FLONASE) 50 MCG/ACT nasal spray Administer 1spray in each nostril twice daily.   • furosemide (LASIX) 40 MG tablet 1 tablet 2 (Two) Times a Day.   • rosuvastatin (CRESTOR) 10 MG tablet Take 1 tablet by mouth Every Night.   • sacubitril-valsartan (ENTRESTO) 49-51 MG tablet Take 1 tablet by mouth 2 (Two) Times a Day.   • spironolactone (ALDACTONE) 25 MG tablet Take 1 tablet by mouth Daily.   • Testosterone Cypionate (DEPOTESTOTERONE CYPIONATE) 200 MG/ML injection Inject 100 mg into the appropriate muscle as directed by prescriber.   • Xarelto 20 MG tablet 1 " tablet Daily With Dinner.     No current facility-administered medications on file prior to visit.     Current Medications:  Scheduled Meds:  Continuous Infusions:No current facility-administered medications for this visit.    PRN Meds:.    I have reviewed the patient's medical history in detail and updated the computerized patient record.  Review and summarization of old records include:    Past Medical History:   Diagnosis Date   • ADHD    • Atrial fibrillation (HCC) 12/28/2020   • Clinical diagnosis of COVID-19 12/23/2021   • COVID     Monoclonal Antibody Infusion on 12/28/2021   • COVID-19 vaccine administered    • HSV-2 (herpes simplex virus 2) infection    • Hx monoclonal drug therapy 12/23/2021    BAM-COVID [place]   • Low testosterone in male    • Sleep apnea     CPAP   • Splenic infarct    • Testosterone deficiency    • Vitamin B 12 deficiency         Past Surgical History:   Procedure Laterality Date   • ADENOIDECTOMY     • APPENDECTOMY     • KNEE ACL RECONSTRUCTION Right 1/18/2022    Procedure: KNEE ANTERIOR CRUCIATE LIGAMENT RECONSTRUCTION WITH AUTOGRAFT, RIGHT KNEE ARTHROSCOPY   ;  Surgeon: Marta Lal MD;  Location: [facility];  Service: Orthopedics;  Laterality: Right;   • TONSILLECTOMY          Social History     Occupational History   • 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[employer])   Tobacco Use   • Smoking status: Never   • Smokeless tobacco: Never   Vaping Use   • Vaping Use: Never used   Substance and Sexual Activity   • Alcohol use: Yes     Comment: OCCASIONAL    • Drug use: No     Comment: former casual marijuana use (more than 2 years ago)   • Sexual activity: Defer     Partners: Female      Social History     Social History Narrative   • Not on file        Family History   Problem Relation Age of Onset   • Lung cancer Mother 49        smoker   • Graves' disease Mother    • Lung cancer Father 52        smoker   • Other Brother         prediabetes   • Coronary artery disease Paternal  Grandmother    • Thyroid disease Paternal Grandmother    • Suicidality Maternal Uncle         committed suicide   • Malig Hyperthermia Neg Hx    • Prostate cancer Neg Hx    • Colon cancer Neg Hx        ROS: 14 point review of systems was performed and was negative except for documented findings in HPI and today's encounter.     Allergies: No Known Allergies  Constitutional:  Denies fever, shaking or chills   Eyes:  Denies change in visual acuity   HENT:  Denies nasal congestion or sore throat   Respiratory:  Denies cough or shortness of breath   Cardiovascular:  Denies chest pain or severe LE edema   GI:  Denies abdominal pain, nausea, vomiting, bloody stools or diarrhea   Musculoskeletal:  Numbness, tingling, or loss of motor function only as noted above in history of present illness.  : Denies painful urination or hematuria  Integument:  Denies rash, lesion or ulceration   Neurologic:  Denies headache or focal weakness  Endocrine:  Denies lymphadenopathy  Psych:  Denies confusion or change in mental status   Hem:  Denies active bleeding      Physical Exam: 32 y.o. male  Wt Readings from Last 3 Encounters:   03/22/23 112 kg (248 lb)   11/14/22 115 kg (253 lb)   09/22/22 115 kg (254 lb)       There is no height or weight on file to calculate BMI.    There were no vitals filed for this visit.  Vital signs reviewed.   General Appearance:    Alert, cooperative, in no acute distress                    Ortho exam    Exam of his right knee is healed surgical incision quads are clearly improving last time I saw him.  His left quads which is nonoperative or he usually does have still some strength to catch up to but overall much better.  He does have some tenderness over the anterior aspect of his knee specifically over the patellar tendon he has some mild medial joint line tenderness no lateral joint line tenderness negative bounce home negative Smith and a stable ligamentous exam.  His knee feels his graft is intact.   Do have an MRI from September which shows a graft that looks great and no meniscal pathology      X-rays AP lateral merchant view of the right knee were taken to evaluate his symptoms I did compared to films taken little over a year ago he has excellent position of his tunnels of the screws good maintenance of his joint space no significant change other than the screws      Assessment: Status post right ACL reconstruction with new onset of pain some of this I think is patellar tendon I think some of it is part within the joint he cannot do oral anti-inflammatories due to his Xarelto therefore we will proceed with an injection of the knee I will also show him a patellar tendon strap have him back off some of his leg exercises he will continue with physical therapy should he fail to improve we will pursue other means of testing    Plan:   Follow up as indicated.  Ice, elevate, and rest as needed.  Discussed conservative measures of pain control including ice, bracing.  Also talked about the importance of strengthening   Marta Lal M.D.  Large Joint Arthrocentesis: R knee  Date/Time: 3/28/2023 1:46 PM  Consent given by: patient  Site marked: site marked  Timeout: Immediately prior to procedure a time out was called to verify the correct patient, procedure, equipment, support staff and site/side marked as required   Supporting Documentation  Indications: pain, joint swelling and diagnostic evaluation   Procedure Details  Location: knee - R knee  Preparation: Patient was prepped and draped in the usual sterile fashion  Needle gauge: 21G.  Medications administered: 80 mg methylPREDNISolone acetate 80 MG/ML; 2 mL lidocaine PF 1% 1 %  Patient tolerance: patient tolerated the procedure well with no immediate complications

## 2023-03-29 RX ORDER — METHYLPREDNISOLONE ACETATE 80 MG/ML
80 INJECTION, SUSPENSION INTRA-ARTICULAR; INTRALESIONAL; INTRAMUSCULAR; SOFT TISSUE
Status: COMPLETED | OUTPATIENT
Start: 2023-03-28 | End: 2023-03-28

## 2023-03-29 RX ORDER — LIDOCAINE HYDROCHLORIDE 10 MG/ML
2 INJECTION, SOLUTION EPIDURAL; INFILTRATION; INTRACAUDAL; PERINEURAL
Status: COMPLETED | OUTPATIENT
Start: 2023-03-28 | End: 2023-03-28

## 2023-04-06 ENCOUNTER — TREATMENT (OUTPATIENT)
Dept: PHYSICAL THERAPY | Facility: CLINIC | Age: 33
End: 2023-04-06
Payer: OTHER MISCELLANEOUS

## 2023-04-06 DIAGNOSIS — G89.29 CHRONIC PAIN OF RIGHT KNEE: ICD-10-CM

## 2023-04-06 DIAGNOSIS — Z98.890 S/P ACL RECONSTRUCTION: Primary | ICD-10-CM

## 2023-04-06 DIAGNOSIS — R29.898 WEAKNESS OF RIGHT LOWER EXTREMITY: ICD-10-CM

## 2023-04-06 DIAGNOSIS — R26.9 GAIT DISTURBANCE: ICD-10-CM

## 2023-04-06 DIAGNOSIS — R53.1 STRENGTH LOSS OF: ICD-10-CM

## 2023-04-06 DIAGNOSIS — M25.561 CHRONIC PAIN OF RIGHT KNEE: ICD-10-CM

## 2023-04-06 PROCEDURE — 97530 THERAPEUTIC ACTIVITIES: CPT | Performed by: PHYSICAL THERAPIST

## 2023-04-06 PROCEDURE — 97110 THERAPEUTIC EXERCISES: CPT | Performed by: PHYSICAL THERAPIST

## 2023-04-06 NOTE — PROGRESS NOTES
Physical Therapy Daily Treatment Note    Patient: Shaheen Rivera   : 1990  Diagnosis/ICD-10 Code:  S/P ACL reconstruction [Z98.890]  Referring practitioner: Marta Lal MD  Date of Initial Visit: Type: THERAPY  Noted: 2022  Today's Date: 2023  Patient seen for 50 sessions    [facility] [place] [address]  [phone] (phone)  [phone] (fax)         Subjective pain is nagging, careful to not twist or turn, only have done the Pelaton since I got the knee injection per MD orders to rest it    Objective          Tenderness     Right Knee   Tenderness in the lateral joint line, medial joint line and patellar tendon (medial proximally).     Strength/Myotome Testing     Additional Strength Details  R knee ext @  90 deg=5/5  45 deg=5/5  0 deg=4/5 with some pain    Tests     Right Knee   Positive Apley's compression (pain both medial and laterally), Apley's distraction (pain medially), valgus stress test at 0 degrees (some medial pain) and varus stress test at 0 degrees.   Negative anterior Lachman, valgus stress test at 30 degrees and varus stress test at 30 degrees.         Exercises:  -bridge x5   - stopped due to lateral knee pain  -hip add iso with ball, 20x 5 sec hold  -SLR with quad set, 2x10  -SL hip abd, 2x10  -SL clam, 1x10  -hip abd/ER w.blue band, 2x10 B and x10 single leg  -seated HS curl, blue band x20  -TKE with blue band, x20 holding 3-5 sec  -piriformis and ITBand stretching 3x20 sec  -prone manual quad stretch 3x20 sec  -hamstring and calf stretching 3x20 sec    Assessment/Plan  Pt had a R knee injection on 3/28. He has been having continued pain over the last few weeks, only possible DAVID was bending down to  some sticks from the yard. The injection has really not helped with his pain much, does have a little less edema on the medial joint line. Still with tenderness over the medial and lateral joint lines, pain  laterally with bridging and SL clam. Pain with special tests may indicate some meniscus irritation or slight tear. Pt is easing back into activity, mainly OK to do Pelaton, SL raises, hamstring curls (with band), TKE, and calf raises/          Timed:    Manual Therapy:         mins  82875;  Therapeutic Exercise:    30     mins  13079;     Neuromuscular Soumya:        mins  65971;    Therapeutic Activity:     12     mins  05685;     Gait Training:           mins  95813;     Ultrasound:          mins  17229;    Electrical Stimulation:         mins  04375 ( );  Iontophoresis         mins 61781;  Aquatic Therapy         mins 67236;      Untimed:  Electrical Stimulation:         mins  33997 ( );  Traction:         mins  50095;   Dry Needling   (1-2 muscles)             mins 20560 (Self-pay)  Dry Needling (3-4 muscles)           mins 20561 (Self-pay)  Dry Needling Trial              mins DRYNDLTRIAL  (No Charge)    Timed Treatment:   42   mins   Total Treatment:     42   mins    Khushi Garcia PT, CDNT  Physical Therapist    [place] License:[id number]

## 2023-04-10 DIAGNOSIS — M76.51 PATELLAR TENDINITIS, RIGHT KNEE: ICD-10-CM

## 2023-04-10 DIAGNOSIS — S83.241D ACUTE MEDIAL MENISCUS TEAR OF RIGHT KNEE, SUBSEQUENT ENCOUNTER: Primary | ICD-10-CM

## 2023-04-20 NOTE — TELEPHONE ENCOUNTER
Patient: Haley Hawthorne    Procedure Summary     Date:  03/28/19 Room / Location:  [facility] IMAGING - INTERVENTIONAL - [facility]    Anesthesia Start:  0815 Anesthesia Stop:  0917    Procedure:  CT-UNLISTED CT PROCEDURE Diagnosis:       Intramuscular hemangioma      Pain in right hip      (o)    Scheduled Providers:   Responsible Provider:  Jacqueline Daily M.D.    Anesthesia Type:  general ASA Status:  2          Final Anesthesia Type: general  Last vitals  BP   Blood Pressure : 142/82, NIBP: 123/64    Temp   36.3 °C (97.3 °F)    Pulse   Pulse: 67   Resp   18    SpO2   94 %      Anesthesia Post Evaluation    Patient location during evaluation: PACU  Patient participation: complete - patient participated  Level of consciousness: awake and alert  Pain score: 0    Airway patency: patent  Anesthetic complications: no  Cardiovascular status: hemodynamically stable  Respiratory status: acceptable  Hydration status: euvolemic    PONV: none           Nurse Pain Score: 0 (NPRS)        
----- Message from Madhuri Everett MA sent at 12/19/2017  7:04 AM EST -----  Regarding: FW: Non-Urgent Medical Question  Contact: [phone]      ----- Message -----     From: Shaheen Rivera     Sent: 12/18/2017   8:12 PM       To: Flaquita Bonilla [facility] Clinical Pool  Subject: Non-Urgent Medical Question                      10-21-17    LH 2.7  FSH 4.2    Now  LH 0.17  FSH 0.27  
Hide Additional Notes?: No
Detail Level: Zone
Detail Level: Detailed

## 2023-04-21 ENCOUNTER — TREATMENT (OUTPATIENT)
Dept: PHYSICAL THERAPY | Facility: CLINIC | Age: 33
End: 2023-04-21
Payer: OTHER MISCELLANEOUS

## 2023-04-21 DIAGNOSIS — M25.561 CHRONIC PAIN OF RIGHT KNEE: ICD-10-CM

## 2023-04-21 DIAGNOSIS — G89.29 CHRONIC PAIN OF RIGHT KNEE: ICD-10-CM

## 2023-04-21 DIAGNOSIS — Z98.890 S/P ACL RECONSTRUCTION: Primary | ICD-10-CM

## 2023-04-21 DIAGNOSIS — R29.898 WEAKNESS OF RIGHT LOWER EXTREMITY: ICD-10-CM

## 2023-04-21 DIAGNOSIS — R53.1 STRENGTH LOSS OF: ICD-10-CM

## 2023-04-21 DIAGNOSIS — R26.9 GAIT DISTURBANCE: ICD-10-CM

## 2023-04-21 NOTE — PROGRESS NOTES
Physical Therapy Daily Treatment Note    Patient: Shaheen Rivera   : 1990  Diagnosis/ICD-10 Code:  S/P ACL reconstruction [Z98.890]  Referring practitioner: Marta Lla MD  Date of Initial Visit: Type: THERAPY  Noted: 2022  Today's Date: 2023  Patient seen for 51 sessions    [facility] [place] [address]  [phone] (phone)  [phone] (fax)         Subjective still having the pain. I have my MRI on May 4th    Objective     Exercises:  -quad isometrics with knee flexed to 90 and 45 deg  -hip add iso with ball, 20x 5 sec hold  -SLR with quad set, 2x10  -SL hip abd, 2x10  -SL clam, 1x10  -hip abd/ER w.blue band, 2x10 B and x10 single leg  -seated HS curl, blue band x20  -TKE with blue band, x20 holding 3-5 sec  -hamstring and calf stretching 3x20 sec      Assessment/Plan  Shaheen is still having R knee pain with walking, stairs, and even sleeping. He has medial knee pain, tenderness over the joint line, and pain with Apley's comp/dist. He has been coming every 2 weeks to therapy, but has his MRI in 2 weeks so will wait and see what that shows before we proceed.          Timed:    Manual Therapy:     8    mins  42774;  Therapeutic Exercise:    28     mins  17971;     Neuromuscular Soumya:        mins  89132;    Therapeutic Activity:          mins  22496;     Gait Training:           mins  75614;     Ultrasound:          mins  57935;    Electrical Stimulation:         mins  63170 ( );  Iontophoresis         mins 14862;  Aquatic Therapy         mins 26557;      Untimed:  Electrical Stimulation:         mins  28443 ( );  Traction:         mins  37359;   Dry Needling   (1-2 muscles)             mins  (Self-pay)  Dry Needling (3-4 muscles)           mins  (Self-pay)  Dry Needling Trial              mins DRYNDLTRIAL  (No Charge)    Timed Treatment:   36   mins   Total Treatment:     36   mins    Khushi Garcia PT,  CDNT  Physical Therapist    [place] License:[id number]

## 2023-04-23 DIAGNOSIS — J18.9 PNEUMONIA OF LEFT LOWER LOBE DUE TO INFECTIOUS ORGANISM: ICD-10-CM

## 2023-04-24 RX ORDER — ALBUTEROL SULFATE 90 UG/1
AEROSOL, METERED RESPIRATORY (INHALATION)
Qty: 8.5 G | Refills: 3 | Status: SHIPPED | OUTPATIENT
Start: 2023-04-24

## 2023-05-04 ENCOUNTER — HOSPITAL ENCOUNTER (OUTPATIENT)
Dept: MRI IMAGING | Facility: HOSPITAL | Age: 33
Discharge: HOME | End: 2023-05-04
Payer: OTHER MISCELLANEOUS

## 2023-05-08 ENCOUNTER — TELEPHONE (OUTPATIENT)
Dept: ORTHOPEDIC SURGERY | Facility: CLINIC | Age: 33
End: 2023-05-08
Payer: COMMERCIAL

## 2023-05-10 ENCOUNTER — TELEPHONE (OUTPATIENT)
Dept: ORTHOPEDIC SURGERY | Facility: CLINIC | Age: 33
End: 2023-05-10
Payer: COMMERCIAL

## 2023-05-10 ENCOUNTER — HOSPITAL ENCOUNTER (OUTPATIENT)
Dept: MRI IMAGING | Facility: HOSPITAL | Age: 33
Discharge: HOME | End: 2023-05-10
Payer: OTHER MISCELLANEOUS

## 2023-05-10 DIAGNOSIS — M76.51 PATELLAR TENDINITIS, RIGHT KNEE: ICD-10-CM

## 2023-05-10 DIAGNOSIS — S83.241D ACUTE MEDIAL MENISCUS TEAR OF RIGHT KNEE, SUBSEQUENT ENCOUNTER: ICD-10-CM

## 2023-05-10 NOTE — TELEPHONE ENCOUNTER
Pt is wanting results of MRI.  Also he asked when he would be able to pull up on [employer]. [phone]

## 2023-05-11 NOTE — TELEPHONE ENCOUNTER
He should be able to pull it up now it all looks good the graft looks good it looks the same as last time I suspect he has some patellar tendinitis which treated conservatively

## 2023-05-12 ENCOUNTER — TREATMENT (OUTPATIENT)
Dept: PHYSICAL THERAPY | Facility: CLINIC | Age: 33
End: 2023-05-12
Payer: OTHER MISCELLANEOUS

## 2023-05-12 DIAGNOSIS — M25.561 CHRONIC PAIN OF RIGHT KNEE: ICD-10-CM

## 2023-05-12 DIAGNOSIS — Z98.890 S/P ACL RECONSTRUCTION: Primary | ICD-10-CM

## 2023-05-12 DIAGNOSIS — R29.898 WEAKNESS OF RIGHT LOWER EXTREMITY: ICD-10-CM

## 2023-05-12 DIAGNOSIS — G89.29 CHRONIC PAIN OF RIGHT KNEE: ICD-10-CM

## 2023-05-12 DIAGNOSIS — R53.1 STRENGTH LOSS OF: ICD-10-CM

## 2023-05-12 DIAGNOSIS — R26.9 GAIT DISTURBANCE: ICD-10-CM

## 2023-05-12 NOTE — PROGRESS NOTES
Knee MRI Follow Up      Patient: Shaheen Rivera        YOB: 1990            Chief Complaints: Knee pain right      History of Present Illness: The patient is here follow-up of an MRI of the knee MRI looks good his graft looks good I do not see an obvious meniscus tear he still has persistent symptoms medially he has been seeing the physical therapist consistently and she feels like his symptoms are consistent with meniscal pathology as well she has tried everything we have injected him all with no lasting improvement      Physical Exam: 32 y.o. male  General Appearance:    Alert, cooperative, in no acute distress                 There were no vitals filed for this visit.     Patient is alert and read ×3 no acute distress appears her above-listed at height weight and age.  Affect is normal respiratory rate is normal unlabored. Heart rate regular rate rhythm, sclera, dentition and hearing are normal for the purpose of this exam.      Ortho Exam  Physical exam of the right  knee reveals no effusion no redness.  The patient does have tenderness about the medial l joint line.  No tenderness about the lateral l joint line.  A negative bounce home and a positive l medial Kasia.    Patient has a stable ligamentous exam.  The patient has a negative Lachman and negative anterior drawer and a negative pivot shift.  Quads are reasonable and symmetric bilaterally.  Calf is soft and nontender.  There is no overlying skin changes no lymphedema lymphadenopathy.  Patient has good hip range of motion full symmetric and asymptomatic and a normal ankle exam.  She has good distal pulses and sensation distally is intact      Physical Exam: 32 y.o. male  General Appearance:    Alert, cooperative, in no acute distress                    There were no vitals filed for this visit.     Head:    Normocephalic, without obvious abnormality, atraumatic   Eyes:            conjunctivae and sclerae normal, no pallor, corneas  clear,    Ears:    Ears appear intact with no abnormalities noted   Throat:   No oral lesions, no thrush, oral mucosa moist   Neck:   No adenopathy, supple, trachea midline, no thyromegaly,    Back:     No kyphosis present, no scoliosis present, no skin lesions,      erythema or scars, no tenderness to percussion or                   palpation,   range of motion normal   Lungs:     ,respirations regular, even and                  unlabored    Heart:    Regular rhythm and normal rate               Chest Wall:    No abnormalities observed               Pulses:   Pulses palpable and equal bilaterally   Skin:   No bleeding, bruising or rash   Lymph nodes:   No palpable adenopathy   Neurologic:   Appears neurologic intact       MRI Results: MRIs as above have reviewed the films myself and agree with the findings    Procedures      Assessment/Plan: Persistent right knee pain despite conservative measures we have done everything conservative he still has activity limiting symptoms I suspect we will find either meniscus tear or defect in the cartilage or something that is causing this medial knee pain plan is to proceed with arthroscopy he will obviously need to come off his blood thinner he will check with his cardiologist to see and make sure they are all good with that he understands all risk benefits and alternatives  The patient voiced understanding of the risks, benefits, and alternative forms of treatment that were discussed and the patient consents to proceed with the above listed surgery.  All risks, benefits and alternatives were discussed.  Risks including to but not exclusive to anesthetic complications, including death, MI, CVA, infection, bleeding DVT, fracture, residual pain and need for future surgery.  He understands and agrees to proceed

## 2023-05-12 NOTE — PROGRESS NOTES
30-Day / 10-Visit Progress Note         Patient: Shaheen Rivera   : 1990  Diagnosis/ICD-10 Code:  S/P ACL reconstruction [Z98.890]  Referring practitioner: Marta Lal MD  Date of Initial Visit: Type: THERAPY  Noted: 2022  Today's Date: 2023  Patient seen for 52 sessions    [facility] [place] [address]  [phone] (phone)  [phone] (fax)    Subjective:     Clinical Progress: worse  Home Program Compliance: Yes  Treatment has included:  therapeutic exercise, manual therapy, therapeutic activity, neuro-muscular retraining , gait training, ultrasound, patient education with home exercise program , orthotic fabrication  and dry needling     Subjective Sorry I had the time wrong today. Knee is still aggravating,  pain can get up to 8-9/10, using the Voltarin cream multiple times a day, icing PRN. No OTC meds.     Objective          Observations     Right Knee   Positive for edema (mild over medial joint line).       Tenderness     Right Knee   Tenderness in the medial joint line.     Active Range of Motion     Right Knee   Flexion: 121 degrees   Extension: 2 (of HE) degrees     Strength/Myotome Testing     Right Knee   Flexion: 5    Additional Strength Details  R knee ext MMT:  At 90 deg=4+ to 5/5  At 45 deg=4+/5  At 0 deg=4/5 with pain      Tests     Right Knee   Positive Apley's compression (medial knee pain), medial Kasia, valgus stress test at 0 degrees (medial knee pain) and varus stress test at 0 degrees (medial knee pain).   Negative anterior drawer, anterior Lachman, Apley's distraction, bounce home, lateral Kasia, valgus stress test at 30 degrees and varus stress test at 30 degrees.     Functional Assessment   Squat   Pain (R medial knee).     Comments  Unable to kneel on R knee  Pain with stairs, descending>ascending        Therapeutic activity:  Extended discussion of activities. Still riding the [place] most  days of the week. He has cut his R LE gym program to leg press, hamstring curls, hip add, hip abd, and calf press.      Manual:  Patellar mobs to release lateral tightness    Functional Outcome Score:   Knee Outcome Survey=55/80 or 68.75% (100% is no disability)    Assessment & Plan     Assessment    Assessment details: Shaheen Rivera has been seen for 52 physical therapy sessions for s/p ACL repair in .  Treatment has included therapeutic exercise, manual therapy, therapeutic activity, neuro-muscular retraining , gait training, ultrasound, patient education with home exercise program , orthotic fabrication  and dry needling . Progress to physical therapy goals is slow. Shaheen has good knee ROM, but continues with VMO weakness. He has also had medial R knee pain for the past couple of months, limiting his WBing activities. He is (+) for medial meniscus pain with McMurrays, Apley's, and with varus and valgus stresses with knee in full extension. He has difficulty with stairs, squatting, and is unable to kneel on the R knee.  He will benefit from continued skilled physical therapy to address remaining impairments and functional limitations after evaluation from MD on 5/15/23.     Prognosis: good    Goals  Plan Goals: ST wks  1. Patient will be independent with education for symptom management, joint protection and strategies to minimize stress on affected tissues (MET)   2. Patient will be able to perform a SLR without a quad lag for improved control of his knee (MET)   3. Pt to improve R knee ROM to ext=0 deg and bkeswhg=225 deg for improved gait pattern (MET)   4. Pt to ambulate without an AD with no gait deficits (MET)     LT wks  1. Pt to improve R knee ROM to ext=2 deg of HE to jglwvju=842 deg for improved gait pattern (ONGOING) (PART MET)   2. Pt to improve score on Knee Outcome Survey from 24% to 90% for overall functional improvement (ONGOING) improved to 65.75%   3. Patient will increase  R knee strength to 5/5 to improve functional mobility (ONGOING) (PART MET)   4. Patient will negotiate stairs reciprocally without rail support (PART MET) for ascending, with pain  5. Patient will demonstrate an independent HEP for core and knee strength and flexibility/ROM. (ONGOING)   6. Pt will be able to perform SL squat to 60 deg x10 with no compensation for overall improved strength and stability of the R knee (ONGOING)  Unable to perform due to pain    Plan  Therapy options: will be seen for skilled therapy services  Frequency: 2x month  Duration in weeks: 8  Treatment plan discussed with: patient  Plan details: Pt had negative MRI but is exhibiting signs of medial meniscus tear. Called and spoke with surgeon about findings today. She meets with him on 5/15 to evaluate           Recommendations: Continue with recommendations RTMD for follow up after MRI  Timeframe: 2 months  Prognosis to achieve goals: good    PT Signature: Khushi Garcia PT, CDNT    License Number: [id number]    Electronically signed by Khushi Garcia PT, 05/12/23, 12:57 PM EDT      Based upon review of the patient's progress and continued therapy plan, it is my medical opinion that Shaheen Rivera should continue physical therapy treatment at [facility]  [address] [place] [address] [phone].    Signature: __________________________________  Marta Lal MD    Timed:  Manual Therapy:   8      mins  33581;  Therapeutic Exercise:    12     mins  12763;     Neuromuscular Soumya:        mins  10829;    Therapeutic Activity:     10     mins  26991;     Gait Training:           mins  16223;     Ultrasound:          mins  61773;    Iontophoresis         mins 69523;    Untimed:  Electrical Stimulation:         mins  12978 ( );  Traction:         mins  12497;   Dry Needling   (1-2 muscles)            mins 96240 (Self-pay)  Dry Needling (3-4 muscles)             mins  91303 (Self-pay)  Dry Needling Trial                 mins DRYNDLTRIAL  (No Charge)    Timed Treatment:   30   mins   Total Treatment:     30   mins

## 2023-05-15 ENCOUNTER — OFFICE VISIT (OUTPATIENT)
Dept: ORTHOPEDIC SURGERY | Facility: CLINIC | Age: 33
End: 2023-05-15
Payer: OTHER MISCELLANEOUS

## 2023-05-15 DIAGNOSIS — M25.561 CHRONIC PAIN OF RIGHT KNEE: Primary | ICD-10-CM

## 2023-05-15 DIAGNOSIS — G89.29 CHRONIC PAIN OF RIGHT KNEE: Primary | ICD-10-CM

## 2023-05-15 DIAGNOSIS — Z98.890 S/P ACL RECONSTRUCTION: ICD-10-CM

## 2023-05-15 DIAGNOSIS — S83.241D OTHER TEAR OF MEDIAL MENISCUS OF RIGHT KNEE AS CURRENT INJURY, SUBSEQUENT ENCOUNTER: ICD-10-CM

## 2023-05-19 ENCOUNTER — TELEPHONE (OUTPATIENT)
Dept: ORTHOPEDIC SURGERY | Facility: CLINIC | Age: 33
End: 2023-05-19
Payer: COMMERCIAL

## 2023-05-19 NOTE — TELEPHONE ENCOUNTER
SPOKE TO PATIENT AND LET HIM KNOW  INFORMATION HE PROVIDED HAS BEEN GIVEN TO TIMBO. AND SHE IS DILIGENTLY WORKING ON HIS WORK COMP SURGERY AUTH.

## 2023-05-19 NOTE — TELEPHONE ENCOUNTER
----- Message from Radha Dawson sent at 5/19/2023  7:53 AM EDT -----  Regarding: FW: Surgery   Contact: [phone]    ----- Message -----  From: Shaheen Rivera  Sent: 5/18/2023   9:37 PM EDT  To: [facility]  Subject: Surgery                                          Just reaching out to see if you have any update on the scheduling of the scope. If you could make sure things move faster could you send whatever authorization they are sending to the insurance carrier to my  office as well and we will send it on to the .  My  has been suggesting that a lot lately because then we can tell if the insurance carrier is just dragging it out.    Khushi Underwood  Assistant to Jose Mendoza  Direct line:  [phone]  Direct fax:   [phone]  [email or web address]    Thank you for all your help

## 2023-05-23 ENCOUNTER — TELEPHONE (OUTPATIENT)
Dept: ORTHOPEDIC SURGERY | Facility: CLINIC | Age: 33
End: 2023-05-23
Payer: COMMERCIAL

## 2023-05-23 NOTE — TELEPHONE ENCOUNTER
Caller: ALFREDO     Relationship:     Best call back number: [phone]    What form or medical record are you requesting: PREVIOUS MRI RESULTS FOR RIGHT KNEE AND PAPERWORK INDICATING RECOMMENDATION FOR SURGERY    Who is requesting this form or medical record from you: GRACY    How would you like to receive the form or medical records (pick-up, mail, fax): [phone] CLAIM NUMBER [id number] (CLAIM NUMBER HAS TO BE LISTED ON FAX)      Timeframe paperwork needed: NA    Additional notes: NA

## 2023-06-02 PROBLEM — G89.29 CHRONIC PAIN OF RIGHT KNEE: Status: ACTIVE | Noted: 2023-06-02

## 2023-06-02 PROBLEM — M25.561 CHRONIC PAIN OF RIGHT KNEE: Status: ACTIVE | Noted: 2023-06-02

## 2023-06-02 PROBLEM — S83.241A TEAR OF MEDIAL MENISCUS OF RIGHT KNEE, CURRENT: Status: ACTIVE | Noted: 2023-06-02

## 2023-06-06 ENCOUNTER — PRE-ADMISSION TESTING (OUTPATIENT)
Dept: PREADMISSION TESTING | Facility: HOSPITAL | Age: 33
End: 2023-06-06
Payer: OTHER MISCELLANEOUS

## 2023-06-06 ENCOUNTER — TELEPHONE (OUTPATIENT)
Dept: ORTHOPEDIC SURGERY | Facility: CLINIC | Age: 33
End: 2023-06-06
Payer: COMMERCIAL

## 2023-06-06 VITALS
OXYGEN SATURATION: 99 % | WEIGHT: 258 LBS | HEART RATE: 53 BPM | DIASTOLIC BLOOD PRESSURE: 70 MMHG | SYSTOLIC BLOOD PRESSURE: 119 MMHG | RESPIRATION RATE: 16 BRPM | HEIGHT: 71 IN | BODY MASS INDEX: 36.12 KG/M2 | TEMPERATURE: 98.1 F

## 2023-06-06 DIAGNOSIS — Z98.890 STATUS POST ARTHROSCOPY OF KNEE: Primary | ICD-10-CM

## 2023-06-06 LAB
ANION GAP SERPL CALCULATED.3IONS-SCNC: 8 MMOL/L (ref 5–15)
BUN SERPL-MCNC: 11 MG/DL (ref 6–20)
BUN/CREAT SERPL: 11 (ref 7–25)
CALCIUM SPEC-SCNC: 9.2 MG/DL (ref 8.6–10.5)
CHLORIDE SERPL-SCNC: 104 MMOL/L (ref 98–107)
CO2 SERPL-SCNC: 30 MMOL/L (ref 22–29)
CREAT SERPL-MCNC: 1 MG/DL (ref 0.76–1.27)
DEPRECATED RDW RBC AUTO: 44.6 FL (ref 37–54)
EGFRCR SERPLBLD CKD-EPI 2021: 103.2 ML/MIN/1.73
ERYTHROCYTE [DISTWIDTH] IN BLOOD BY AUTOMATED COUNT: 13.2 % (ref 12.3–15.4)
GLUCOSE SERPL-MCNC: 103 MG/DL (ref 65–99)
HCT VFR BLD AUTO: 47.1 % (ref 37.5–51)
HGB BLD-MCNC: 16 G/DL (ref 13–17.7)
MCH RBC QN AUTO: 31.2 PG (ref 26.6–33)
MCHC RBC AUTO-ENTMCNC: 34 G/DL (ref 31.5–35.7)
MCV RBC AUTO: 91.8 FL (ref 79–97)
PLATELET # BLD AUTO: 204 10*3/MM3 (ref 140–450)
PMV BLD AUTO: 10.1 FL (ref 6–12)
POTASSIUM SERPL-SCNC: 3.8 MMOL/L (ref 3.5–5.2)
QT INTERVAL: 424 MS
RBC # BLD AUTO: 5.13 10*6/MM3 (ref 4.14–5.8)
SODIUM SERPL-SCNC: 142 MMOL/L (ref 136–145)
WBC NRBC COR # BLD: 6.47 10*3/MM3 (ref 3.4–10.8)

## 2023-06-06 PROCEDURE — 93005 ELECTROCARDIOGRAM TRACING: CPT

## 2023-06-06 PROCEDURE — 80048 BASIC METABOLIC PNL TOTAL CA: CPT

## 2023-06-06 PROCEDURE — 36415 COLL VENOUS BLD VENIPUNCTURE: CPT

## 2023-06-06 PROCEDURE — 85027 COMPLETE CBC AUTOMATED: CPT

## 2023-06-06 NOTE — DISCHARGE INSTRUCTIONS
Take the following medications the morning of surgery:    CARVEDILOL AM OF SURGERY    ARRIVE AT 5:15      If you are on prescription narcotic pain medication to control your pain you may also take that medication the morning of surgery.    General Instructions:  Do not eat solid food after midnight the night before surgery.  You may drink clear liquids day of surgery but must stop at least one hour before your hospital arrival time.  It is beneficial for you to have a clear drink that contains carbohydrates the day of surgery.  We suggest a 12 to 20 ounce bottle of Gatorade or Powerade for non-diabetic patients or a 12 to 20 ounce bottle of G2 or Powerade Zero for diabetic patients. (Pediatric patients, are not advised to drink a 12 to 20 ounce carbohydrate drink)    Clear liquids are liquids you can see through.  Nothing red in color.     Plain water                               Sports drinks  Sodas                                   Gelatin (Jell-O)  Fruit juices without pulp such as white grape juice and apple juice  Popsicles that contain no fruit or yogurt  Tea or coffee (no cream or milk added)  Gatorade / Powerade  G2 / Powerade Zero    Infants may have breast milk up to four hours before surgery.  Infants drinking formula may drink formula up to six hours before surgery.   Patients who avoid smoking, chewing tobacco and alcohol for 4 weeks prior to surgery have a reduced risk of post-operative complications.  Quit smoking as many days before surgery as you can.  Do not smoke, use chewing tobacco or drink alcohol the day of surgery.   If applicable bring your C-PAP/ BI-PAP machine in with you to preop day of surgery.  Bring any papers given to you in the doctor’s office.  Wear clean comfortable clothes.  Do not wear contact lenses, false eyelashes or make-up.  Bring a case for your glasses.   Bring crutches or walker if applicable.  Remove all piercings.  Leave jewelry and any other valuables at home.  Hair  extensions with metal clips must be removed prior to surgery.  The Pre-Admission Testing nurse will instruct you to bring medications if unable to obtain an accurate list in Pre-Admission Testing.        Preventing a Surgical Site Infection:  For 2 to 3 days before surgery, avoid shaving with a razor because the razor can irritate skin and make it easier to develop an infection.    Any areas of open skin can increase the risk of a post-operative wound infection by allowing bacteria to enter and travel throughout the body.  Notify your surgeon if you have any skin wounds / rashes even if it is not near the expected surgical site.  The area will need assessed to determine if surgery should be delayed until it is healed.  The night prior to surgery shower using a fresh bar of anti-bacterial soap (such as Dial) and clean washcloth.  Sleep in a clean bed with clean clothing.  Do not allow pets to sleep with you.  Shower on the morning of surgery using a fresh bar of anti-bacterial soap (such as Dial) and clean washcloth.  Dry with a clean towel and dress in clean clothing.  Ask your surgeon if you will be receiving antibiotics prior to surgery.  Make sure you, your family, and all healthcare providers clean their hands with soap and water or an alcohol based hand  before caring for you or your wound.    Day of surgery:  Your arrival time is approximately two hours before your scheduled surgery time.  Upon arrival, a Pre-op nurse and Anesthesiologist will review your health history, obtain vital signs, and answer questions you may have.  The only belongings needed at this time will be a list of your home medications and if applicable your C-PAP/BI-PAP machine.  A Pre-op nurse will start an IV and you may receive medication in preparation for surgery, including something to help you relax.     Please be aware that surgery does come with discomfort.  We want to make every effort to control your discomfort so please  discuss any uncontrolled symptoms with your nurse.   Your doctor will most likely have prescribed pain medications.      If you are going home after surgery you will receive individualized written care instructions before being discharged.  A responsible adult must drive you to and from the hospital on the day of your surgery and stay with you for 24 hours.  Discharge prescriptions can be filled by the hospital pharmacy during regular pharmacy hours.  If you are having surgery late in the day/evening your prescription may be e-prescribed to your pharmacy.  Please verify your pharmacy hours or chose a 24 hour pharmacy to avoid not having access to your prescription because your pharmacy has closed for the day.    If you are staying overnight following surgery, you will be transported to your hospital room following the recovery period.  [facility] has all private rooms.    If you have any questions please call Pre-Admission Testing at [phone].  Deductibles and co-payments are collected on the day of service. Please be prepared to pay the required co-pay, deductible or deposit on the day of service as defined by your plan.    Call your surgeon immediately if you experience any of the following symptoms:  Sore Throat  Shortness of Breath or difficulty breathing  Cough  Chills  Body soreness or muscle pain  Headache  Fever  New loss of taste or smell  Do not arrive for your surgery ill.  Your procedure will need to be rescheduled to another time.  You will need to call your physician before the day of surgery to avoid any unnecessary exposure to hospital staff as well as other patients.

## 2023-06-06 NOTE — TELEPHONE ENCOUNTER
Shaheen Rivera, is going to have another knee surgery on his right knee by Dr. Lal on 6/13/23. Anne has him starting on 6/20/23. Will you please send a new physical therapy referral in [facility]?

## 2023-06-13 ENCOUNTER — ANESTHESIA (OUTPATIENT)
Dept: PERIOP | Facility: HOSPITAL | Age: 33
End: 2023-06-13
Payer: COMMERCIAL

## 2023-06-13 ENCOUNTER — HOSPITAL ENCOUNTER (OUTPATIENT)
Facility: HOSPITAL | Age: 33
Setting detail: HOSPITAL OUTPATIENT SURGERY
Discharge: HOME | End: 2023-06-13
Attending: ORTHOPAEDIC SURGERY | Admitting: ORTHOPAEDIC SURGERY
Payer: COMMERCIAL

## 2023-06-13 ENCOUNTER — ANESTHESIA EVENT (OUTPATIENT)
Dept: PERIOP | Facility: HOSPITAL | Age: 33
End: 2023-06-13
Payer: COMMERCIAL

## 2023-06-13 VITALS
HEART RATE: 56 BPM | DIASTOLIC BLOOD PRESSURE: 47 MMHG | RESPIRATION RATE: 16 BRPM | SYSTOLIC BLOOD PRESSURE: 119 MMHG | OXYGEN SATURATION: 96 % | TEMPERATURE: 97.8 F

## 2023-06-13 DIAGNOSIS — G89.29 CHRONIC PAIN OF RIGHT KNEE: ICD-10-CM

## 2023-06-13 DIAGNOSIS — S83.241D OTHER TEAR OF MEDIAL MENISCUS OF RIGHT KNEE AS CURRENT INJURY, SUBSEQUENT ENCOUNTER: ICD-10-CM

## 2023-06-13 DIAGNOSIS — M25.561 CHRONIC PAIN OF RIGHT KNEE: ICD-10-CM

## 2023-06-13 PROCEDURE — 25010000002 CEFAZOLIN IN DEXTROSE 2-4 GM/100ML-% SOLUTION: Performed by: ORTHOPAEDIC SURGERY

## 2023-06-13 PROCEDURE — 25010000002 KETOROLAC TROMETHAMINE PER 15 MG: Performed by: NURSE ANESTHETIST, CERTIFIED REGISTERED

## 2023-06-13 PROCEDURE — 25010000002 ONDANSETRON PER 1 MG: Performed by: NURSE ANESTHETIST, CERTIFIED REGISTERED

## 2023-06-13 PROCEDURE — 25010000002 PROPOFOL 10 MG/ML EMULSION: Performed by: NURSE ANESTHETIST, CERTIFIED REGISTERED

## 2023-06-13 PROCEDURE — 25010000002 METHYLPREDNISOLONE PER 80 MG: Performed by: ORTHOPAEDIC SURGERY

## 2023-06-13 PROCEDURE — 25010000002 DEXAMETHASONE SODIUM PHOSPHATE 20 MG/5ML SOLUTION: Performed by: NURSE ANESTHETIST, CERTIFIED REGISTERED

## 2023-06-13 PROCEDURE — 25010000002 FENTANYL CITRATE (PF) 50 MCG/ML SOLUTION: Performed by: NURSE ANESTHETIST, CERTIFIED REGISTERED

## 2023-06-13 PROCEDURE — 25010000002 FENTANYL CITRATE (PF) 100 MCG/2ML SOLUTION: Performed by: NURSE ANESTHETIST, CERTIFIED REGISTERED

## 2023-06-13 PROCEDURE — 25010000002 PHENYLEPHRINE 10 MG/ML SOLUTION: Performed by: NURSE ANESTHETIST, CERTIFIED REGISTERED

## 2023-06-13 RX ORDER — FENTANYL CITRATE 50 UG/ML
INJECTION, SOLUTION INTRAMUSCULAR; INTRAVENOUS AS NEEDED
Status: DISCONTINUED | OUTPATIENT
Start: 2023-06-13 | End: 2023-06-13 | Stop reason: SURG

## 2023-06-13 RX ORDER — FENTANYL CITRATE 50 UG/ML
50 INJECTION, SOLUTION INTRAMUSCULAR; INTRAVENOUS
Status: DISCONTINUED | OUTPATIENT
Start: 2023-06-13 | End: 2023-06-13 | Stop reason: HOSPADM

## 2023-06-13 RX ORDER — FENTANYL CITRATE 50 UG/ML
50 INJECTION, SOLUTION INTRAMUSCULAR; INTRAVENOUS ONCE AS NEEDED
Status: DISCONTINUED | OUTPATIENT
Start: 2023-06-13 | End: 2023-06-13 | Stop reason: HOSPADM

## 2023-06-13 RX ORDER — EPHEDRINE SULFATE 50 MG/ML
5 INJECTION, SOLUTION INTRAVENOUS ONCE AS NEEDED
Status: DISCONTINUED | OUTPATIENT
Start: 2023-06-13 | End: 2023-06-13 | Stop reason: HOSPADM

## 2023-06-13 RX ORDER — HYDROCODONE BITARTRATE AND ACETAMINOPHEN 7.5; 325 MG/1; MG/1
1 TABLET ORAL ONCE AS NEEDED
Status: COMPLETED | OUTPATIENT
Start: 2023-06-13 | End: 2023-06-13

## 2023-06-13 RX ORDER — OXYCODONE AND ACETAMINOPHEN 7.5; 325 MG/1; MG/1
1 TABLET ORAL EVERY 4 HOURS PRN
Status: DISCONTINUED | OUTPATIENT
Start: 2023-06-13 | End: 2023-06-13 | Stop reason: HOSPADM

## 2023-06-13 RX ORDER — PROMETHAZINE HYDROCHLORIDE 25 MG/1
25 TABLET ORAL ONCE AS NEEDED
Status: DISCONTINUED | OUTPATIENT
Start: 2023-06-13 | End: 2023-06-13 | Stop reason: HOSPADM

## 2023-06-13 RX ORDER — SODIUM CHLORIDE, SODIUM LACTATE, POTASSIUM CHLORIDE, CALCIUM CHLORIDE 600; 310; 30; 20 MG/100ML; MG/100ML; MG/100ML; MG/100ML
9 INJECTION, SOLUTION INTRAVENOUS CONTINUOUS
Status: DISCONTINUED | OUTPATIENT
Start: 2023-06-13 | End: 2023-06-13 | Stop reason: HOSPADM

## 2023-06-13 RX ORDER — CEFAZOLIN SODIUM 2 G/100ML
2 INJECTION, SOLUTION INTRAVENOUS ONCE
Status: COMPLETED | OUTPATIENT
Start: 2023-06-13 | End: 2023-06-13

## 2023-06-13 RX ORDER — KETOROLAC TROMETHAMINE 30 MG/ML
INJECTION, SOLUTION INTRAMUSCULAR; INTRAVENOUS AS NEEDED
Status: DISCONTINUED | OUTPATIENT
Start: 2023-06-13 | End: 2023-06-13 | Stop reason: SURG

## 2023-06-13 RX ORDER — PHENYLEPHRINE HYDROCHLORIDE 10 MG/ML
INJECTION INTRAVENOUS AS NEEDED
Status: DISCONTINUED | OUTPATIENT
Start: 2023-06-13 | End: 2023-06-13 | Stop reason: SURG

## 2023-06-13 RX ORDER — NALOXONE HCL 0.4 MG/ML
0.2 VIAL (ML) INJECTION AS NEEDED
Status: DISCONTINUED | OUTPATIENT
Start: 2023-06-13 | End: 2023-06-13 | Stop reason: HOSPADM

## 2023-06-13 RX ORDER — GLYCOPYRROLATE 0.2 MG/ML
INJECTION INTRAMUSCULAR; INTRAVENOUS AS NEEDED
Status: DISCONTINUED | OUTPATIENT
Start: 2023-06-13 | End: 2023-06-13 | Stop reason: SURG

## 2023-06-13 RX ORDER — PROPOFOL 10 MG/ML
VIAL (ML) INTRAVENOUS AS NEEDED
Status: DISCONTINUED | OUTPATIENT
Start: 2023-06-13 | End: 2023-06-13 | Stop reason: SURG

## 2023-06-13 RX ORDER — DROPERIDOL 2.5 MG/ML
0.62 INJECTION, SOLUTION INTRAMUSCULAR; INTRAVENOUS
Status: DISCONTINUED | OUTPATIENT
Start: 2023-06-13 | End: 2023-06-13 | Stop reason: HOSPADM

## 2023-06-13 RX ORDER — METHYLPREDNISOLONE ACETATE 80 MG/ML
INJECTION, SUSPENSION INTRA-ARTICULAR; INTRALESIONAL; INTRAMUSCULAR; SOFT TISSUE AS NEEDED
Status: DISCONTINUED | OUTPATIENT
Start: 2023-06-13 | End: 2023-06-13 | Stop reason: HOSPADM

## 2023-06-13 RX ORDER — HYDROCODONE BITARTRATE AND ACETAMINOPHEN 5; 325 MG/1; MG/1
1 TABLET ORAL EVERY 4 HOURS PRN
Qty: 30 TABLET | Refills: 0 | Status: SHIPPED | OUTPATIENT
Start: 2023-06-13

## 2023-06-13 RX ORDER — HYDRALAZINE HYDROCHLORIDE 20 MG/ML
5 INJECTION INTRAMUSCULAR; INTRAVENOUS
Status: DISCONTINUED | OUTPATIENT
Start: 2023-06-13 | End: 2023-06-13 | Stop reason: HOSPADM

## 2023-06-13 RX ORDER — DIPHENHYDRAMINE HYDROCHLORIDE 50 MG/ML
12.5 INJECTION INTRAMUSCULAR; INTRAVENOUS
Status: DISCONTINUED | OUTPATIENT
Start: 2023-06-13 | End: 2023-06-13 | Stop reason: HOSPADM

## 2023-06-13 RX ORDER — ACETAMINOPHEN 325 MG/1
650 TABLET ORAL ONCE
Status: COMPLETED | OUTPATIENT
Start: 2023-06-13 | End: 2023-06-13

## 2023-06-13 RX ORDER — FAMOTIDINE 10 MG/ML
20 INJECTION, SOLUTION INTRAVENOUS ONCE
Status: COMPLETED | OUTPATIENT
Start: 2023-06-13 | End: 2023-06-13

## 2023-06-13 RX ORDER — DEXAMETHASONE SODIUM PHOSPHATE 4 MG/ML
INJECTION, SOLUTION INTRA-ARTICULAR; INTRALESIONAL; INTRAMUSCULAR; INTRAVENOUS; SOFT TISSUE AS NEEDED
Status: DISCONTINUED | OUTPATIENT
Start: 2023-06-13 | End: 2023-06-13 | Stop reason: SURG

## 2023-06-13 RX ORDER — EPHEDRINE SULFATE 50 MG/ML
INJECTION INTRAVENOUS AS NEEDED
Status: DISCONTINUED | OUTPATIENT
Start: 2023-06-13 | End: 2023-06-13 | Stop reason: SURG

## 2023-06-13 RX ORDER — SODIUM CHLORIDE 0.9 % (FLUSH) 0.9 %
3 SYRINGE (ML) INJECTION EVERY 12 HOURS SCHEDULED
Status: DISCONTINUED | OUTPATIENT
Start: 2023-06-13 | End: 2023-06-13 | Stop reason: HOSPADM

## 2023-06-13 RX ORDER — FLUMAZENIL 0.1 MG/ML
0.2 INJECTION INTRAVENOUS AS NEEDED
Status: DISCONTINUED | OUTPATIENT
Start: 2023-06-13 | End: 2023-06-13 | Stop reason: HOSPADM

## 2023-06-13 RX ORDER — SODIUM CHLORIDE, SODIUM LACTATE, POTASSIUM CHLORIDE, AND CALCIUM CHLORIDE .6; .31; .03; .02 G/100ML; G/100ML; G/100ML; G/100ML
IRRIGANT IRRIGATION AS NEEDED
Status: DISCONTINUED | OUTPATIENT
Start: 2023-06-13 | End: 2023-06-13 | Stop reason: HOSPADM

## 2023-06-13 RX ORDER — ONDANSETRON 2 MG/ML
INJECTION INTRAMUSCULAR; INTRAVENOUS AS NEEDED
Status: DISCONTINUED | OUTPATIENT
Start: 2023-06-13 | End: 2023-06-13 | Stop reason: SURG

## 2023-06-13 RX ORDER — HYDROMORPHONE HYDROCHLORIDE 1 MG/ML
0.5 INJECTION, SOLUTION INTRAMUSCULAR; INTRAVENOUS; SUBCUTANEOUS
Status: DISCONTINUED | OUTPATIENT
Start: 2023-06-13 | End: 2023-06-13 | Stop reason: HOSPADM

## 2023-06-13 RX ORDER — LABETALOL HYDROCHLORIDE 5 MG/ML
5 INJECTION, SOLUTION INTRAVENOUS
Status: DISCONTINUED | OUTPATIENT
Start: 2023-06-13 | End: 2023-06-13 | Stop reason: HOSPADM

## 2023-06-13 RX ORDER — PROMETHAZINE HYDROCHLORIDE 25 MG/1
25 SUPPOSITORY RECTAL ONCE AS NEEDED
Status: DISCONTINUED | OUTPATIENT
Start: 2023-06-13 | End: 2023-06-13 | Stop reason: HOSPADM

## 2023-06-13 RX ORDER — SODIUM CHLORIDE 0.9 % (FLUSH) 0.9 %
3-10 SYRINGE (ML) INJECTION AS NEEDED
Status: DISCONTINUED | OUTPATIENT
Start: 2023-06-13 | End: 2023-06-13 | Stop reason: HOSPADM

## 2023-06-13 RX ORDER — ONDANSETRON 2 MG/ML
4 INJECTION INTRAMUSCULAR; INTRAVENOUS ONCE AS NEEDED
Status: COMPLETED | OUTPATIENT
Start: 2023-06-13 | End: 2023-06-13

## 2023-06-13 RX ORDER — MIDAZOLAM HYDROCHLORIDE 1 MG/ML
1 INJECTION INTRAMUSCULAR; INTRAVENOUS
Status: DISCONTINUED | OUTPATIENT
Start: 2023-06-13 | End: 2023-06-13 | Stop reason: HOSPADM

## 2023-06-13 RX ORDER — IPRATROPIUM BROMIDE AND ALBUTEROL SULFATE 2.5; .5 MG/3ML; MG/3ML
3 SOLUTION RESPIRATORY (INHALATION) ONCE AS NEEDED
Status: DISCONTINUED | OUTPATIENT
Start: 2023-06-13 | End: 2023-06-13 | Stop reason: HOSPADM

## 2023-06-13 RX ORDER — LIDOCAINE HYDROCHLORIDE 10 MG/ML
0.5 INJECTION, SOLUTION EPIDURAL; INFILTRATION; INTRACAUDAL; PERINEURAL ONCE AS NEEDED
Status: DISCONTINUED | OUTPATIENT
Start: 2023-06-13 | End: 2023-06-13 | Stop reason: HOSPADM

## 2023-06-13 RX ORDER — LIDOCAINE HYDROCHLORIDE 20 MG/ML
INJECTION, SOLUTION INFILTRATION; PERINEURAL AS NEEDED
Status: DISCONTINUED | OUTPATIENT
Start: 2023-06-13 | End: 2023-06-13 | Stop reason: SURG

## 2023-06-13 RX ADMIN — ONDANSETRON 4 MG: 2 INJECTION INTRAMUSCULAR; INTRAVENOUS at 08:50

## 2023-06-13 RX ADMIN — CEFAZOLIN SODIUM 2 G: 2 INJECTION, SOLUTION INTRAVENOUS at 06:54

## 2023-06-13 RX ADMIN — EPHEDRINE SULFATE 10 MG: 50 INJECTION INTRAVENOUS at 07:09

## 2023-06-13 RX ADMIN — SODIUM CHLORIDE, POTASSIUM CHLORIDE, SODIUM LACTATE AND CALCIUM CHLORIDE 9 ML/HR: 600; 310; 30; 20 INJECTION, SOLUTION INTRAVENOUS at 06:19

## 2023-06-13 RX ADMIN — LIDOCAINE HYDROCHLORIDE 100 MG: 20 INJECTION, SOLUTION INFILTRATION; PERINEURAL at 07:02

## 2023-06-13 RX ADMIN — PHENYLEPHRINE HYDROCHLORIDE 100 MCG: 10 INJECTION, SOLUTION INTRAVENOUS at 07:23

## 2023-06-13 RX ADMIN — FENTANYL CITRATE 25 MCG: 50 INJECTION, SOLUTION INTRAMUSCULAR; INTRAVENOUS at 07:06

## 2023-06-13 RX ADMIN — FENTANYL CITRATE 25 MCG: 50 INJECTION, SOLUTION INTRAMUSCULAR; INTRAVENOUS at 07:17

## 2023-06-13 RX ADMIN — EPHEDRINE SULFATE 10 MG: 50 INJECTION INTRAVENOUS at 07:12

## 2023-06-13 RX ADMIN — ONDANSETRON 4 MG: 2 INJECTION INTRAMUSCULAR; INTRAVENOUS at 07:12

## 2023-06-13 RX ADMIN — GLYCOPYRROLATE 0.2 MG: 1 INJECTION INTRAMUSCULAR; INTRAVENOUS at 07:02

## 2023-06-13 RX ADMIN — KETOROLAC TROMETHAMINE 30 MG: 30 INJECTION, SOLUTION INTRAMUSCULAR at 07:35

## 2023-06-13 RX ADMIN — FENTANYL CITRATE 50 MCG: 50 INJECTION, SOLUTION INTRAMUSCULAR; INTRAVENOUS at 08:00

## 2023-06-13 RX ADMIN — FENTANYL CITRATE 50 MCG: 50 INJECTION, SOLUTION INTRAMUSCULAR; INTRAVENOUS at 08:13

## 2023-06-13 RX ADMIN — FAMOTIDINE 20 MG: 10 INJECTION INTRAVENOUS at 06:19

## 2023-06-13 RX ADMIN — EPHEDRINE SULFATE 10 MG: 50 INJECTION INTRAVENOUS at 07:23

## 2023-06-13 RX ADMIN — DEXAMETHASONE SODIUM PHOSPHATE 8 MG: 4 INJECTION, SOLUTION INTRAMUSCULAR; INTRAVENOUS at 07:05

## 2023-06-13 RX ADMIN — FENTANYL CITRATE 50 MCG: 50 INJECTION, SOLUTION INTRAMUSCULAR; INTRAVENOUS at 08:34

## 2023-06-13 RX ADMIN — PHENYLEPHRINE HYDROCHLORIDE 100 MCG: 10 INJECTION, SOLUTION INTRAVENOUS at 07:17

## 2023-06-13 RX ADMIN — HYDROCODONE BITARTRATE AND ACETAMINOPHEN 1 TABLET: 7.5; 325 TABLET ORAL at 07:59

## 2023-06-13 RX ADMIN — ACETAMINOPHEN 650 MG: 325 TABLET, FILM COATED ORAL at 06:19

## 2023-06-13 RX ADMIN — PROPOFOL 200 MG: 10 INJECTION, EMULSION INTRAVENOUS at 07:02

## 2023-06-13 NOTE — OP NOTE
Operative Note      Facility: [facility]  Patient Name: Shaheen Rivera  YOB: 1990  Date: 6/13/2023  Medical Record Number: [id number]      Pre-op Diagnosis:   Chronic pain of right knee [M25.561, G89.29]  Other tear of medial meniscus of right knee as current injury, subsequent encounter [H21.430O] status post ACL reconstruction    Postoperative diagnosis is lateral meniscus tear, defect medial femoral condyle (most lateral aspect)    Procedure(s):  KNEE ARTHROSCOPY with partial lateral menisectomy with debridement of medial femoral condyle  Surgeon(s):  Marta Lal MD    Anesthesia: General  Anesthesiologist: Juan Klein MD  CRNA: Shira Veliz CRNA    Staff:   Circulator: Shereen Hook RN  Scrub Person: Elisabet Dior RN    Assistants : none      Estimated Blood Loss: 5 mL    Specimens:    none     Drains: None    Findings: See Dictation    Complications: None      Indication for procedure: This patient has had a several month history of knee pain and has an exam and an MRI which are consistent with meniscal pathology. They understand all options and wish to proceed with arthroscopy.      Description of procedure: The patient was taken to the operating room. They were placed supine on the operating room table. After induction of adequate LMA anesthesia, IV antibiotics the patient underwent exam under anesthesia with symmetric full range of motion.  He also had a negative Lachman and a negative pivot shift nonsterile tourniquet was applied patient was placed in the thigh ivan all prominent areas were well padded and end of the table dropped. The leg was prepped and draped in usual sterile fashion. Standard lateral incision was made with 11 blade. Blunt trocar penetrated into the joint, scope followed and the evaluation began.  The patella appeared to sit centrally within the trochlear groove the cartilage surfaces were normal then entered the medial compartment or  spinal needle localization direct visualization a medial portal was established.  He had a lot of that thickened fat pad anteriorly and some scar tissue from his previous repair.  I was able to evaluate the meniscus it was normal.  He did have an injury to the medial femoral condyle on his most lateral aspect that was gently debrided.  I do believe this could have caused some of his current symptoms.  I also debrided the fat pad anteriorly.  I then entered the lateral compartment he had a small central tear lateral meniscus that was resected with a motorized shaver and the Apollo device.  The condyle and the plateau were normal            At this point everything was thoroughly irrigated it was suctioned all 3 compartments, the gutters the suprapatellar pouch were all evaluated there was no further acute pathology seen.  Everything was thoroughly irrigated it was injected with Marcaine Depo-Medrol.  The portals were closed with 3-0 nylon interrupted fashion.  Sterile dressings and Ace wraps were applied.  The patient tolerated the procedure well and was taken to recovery room in good condition.  All sponge and needle counts were correct.                    Date: 6/13/2023  Time: 07:49 EDT

## 2023-06-13 NOTE — ANESTHESIA PREPROCEDURE EVALUATION
Anesthesia Evaluation     Patient summary reviewed   no history of anesthetic complications:   NPO Solid Status: > 8 hours  NPO Liquid Status: > 2 hours           Airway   Mallampati: II  TM distance: >3 FB  Neck ROM: full  No difficulty expected  Dental      Pulmonary     breath sounds clear to auscultation  (+) a smoker Former, asthma,sleep apnea on CPAP  (-) shortness of breath, recent URI (COVID 12/28/21)  Cardiovascular     ECG reviewed  PT is on anticoagulation therapy  Patient on routine beta blocker and Beta blocker given within 24 hours of surgery  Rate: normal    (+) dysrhythmias Atrial Fib, CHF   (-) past MI, angina    ROS comment: Cardiac clearance received, at similar risk to age cohort will hold eliquis 48h.    Neuro/Psych  (+) psychiatric history ADHD  (-) seizures, CVA  GI/Hepatic/Renal/Endo    (+) obesity  (-) no renal disease, diabetes    Musculoskeletal     Abdominal    Substance History Drug use: reports prvs marijuana use.     OB/GYN          Other                        Anesthesia Plan    ASA 3     general     (Dilaudid caused several days of N/V - doesn't want it)  intravenous induction     Anesthetic plan, risks, benefits, and alternatives have been provided, discussed and informed consent has been obtained with: patient.

## 2023-06-13 NOTE — ANESTHESIA PROCEDURE NOTES
Airway  Urgency: elective    Date/Time: 6/13/2023 7:03 AM  Airway not difficult    General Information and Staff    Patient location during procedure: OR  Anesthesiologist: Chacorta Hernandez MD  CRNA/CAA: Shira Veliz CRNA    Indications and Patient Condition  Indications for airway management: airway protection    Preoxygenated: yes  Mask difficulty assessment: 0 - not attempted    Final Airway Details  Final airway type: supraglottic airway      Successful airway: classic  Size 4     Number of attempts at approach: 1  Assessment: lips, teeth, and gum same as pre-op

## 2023-06-13 NOTE — H&P
History & Physical       Patient: Shaheen Rivera    Date of Admission: 6/13/2023  5:20 AM    YOB: 1990    Medical Record Number: [id number]    Attending Physician: Marta Lal MD        Chief Complaints: Chronic pain of right knee [M25.561, G89.29]  Other tear of medial meniscus of right knee as current injury, subsequent encounter [M37.830W]      History of Present Illness: This patient status post ACL reconstruction over a year ago he had a new event still has a stable exam graft looks great on MRI but having some pain that is consistent with meniscal pathology despite looking pretty good on MRI.  He has failed conservative management presents for arthroscopy     Allergies: No Known Allergies    Medications:   Home Medications:  No current facility-administered medications on file prior to encounter.     Current Outpatient Medications on File Prior to Encounter   Medication Sig   • anastrozole (ARIMIDEX) 1 MG tablet Take 1 tablet by mouth Daily.   • carvedilol (COREG) 12.5 MG tablet Take 1.5 tablets by mouth 2 (Two) Times a Day.   • cetirizine (zyrTEC) 10 MG tablet Take 1 tablet by mouth Every Night. (Patient taking differently: Take 1 tablet by mouth Every Morning.)   • fluticasone (FLONASE) 50 MCG/ACT nasal spray Administer 1spray in each nostril twice daily. (Patient taking differently: 1 spray into the nostril(s) as directed by provider As Needed. Administer 1spray in each nostril twice daily.)   • furosemide (LASIX) 40 MG tablet Take 1 tablet by mouth 2 (Two) Times a Day.   • rosuvastatin (CRESTOR) 10 MG tablet Take 1 tablet by mouth Every Night.   • sacubitril-valsartan (ENTRESTO) 49-51 MG tablet Take 1 tablet by mouth 2 (Two) Times a Day.   • spironolactone (ALDACTONE) 25 MG tablet Take 1 tablet by mouth Daily.   • Testosterone Cypionate (DEPOTESTOTERONE CYPIONATE) 200 MG/ML injection Inject 100 mg into the appropriate muscle as directed by prescriber 1 (One) Time Per Week.   •  "acyclovir (ZOVIRAX) 200 MG capsule Take 1 capsule by mouth 2 (Two) Times a Day.   • albuterol sulfate  (90 Base) MCG/ACT inhaler Inhale 2 puffs orally every 4 hours as needed for wheezing or shortness of air. (Patient taking differently: Inhale 2 puffs Every 4 (Four) Hours As Needed.)   • B-D 3CC LUER-IGNACIA SYR 23DH7-8/2 22G X 1-1/2\" 3 ML misc  (Patient not taking: Reported on 3/28/2023)   • BD Hypodermic Needle 18G X 1\" misc  (Patient not taking: Reported on 3/28/2023)   • Cyanocobalamin (B-12-SL) 1000 MCG sublingual tablet Place 1 tablet under the tongue Daily.   • Xarelto 20 MG tablet 1 tablet Daily With Dinner. INSTRUCTED TO HOLD PER MD 2 TO 3 DAYS BEFORE SURGERY     Current Medications:  Scheduled Meds:ceFAZolin, 2 g, Intravenous, Once  sodium chloride, 3 mL, Intravenous, Q12H      Continuous Infusions:lactated ringers, 9 mL/hr, Last Rate: 9 mL/hr (06/13/23 0619)      PRN Meds:.•  fentanyl  •  lidocaine PF 1%  •  midazolam  •  sodium chloride    Past Medical History:   Diagnosis Date   • ADHD    • Asthma    • Atrial fibrillation 12/28/2020   • CHF (congestive heart failure)    • Clinical diagnosis of COVID-19 12/23/2021   • COVID     Monoclonal Antibody Infusion on 12/28/2021   • COVID-19 vaccine administered    • HSV-2 (herpes simplex virus 2) infection    • Hx monoclonal drug therapy 12/23/2021    [facility]AM-COVID [place]   • Low testosterone in male    • Right knee pain    • Sleep apnea     CPAP   • Splenic infarct    • Testosterone deficiency    • Vitamin B 12 deficiency         Past Surgical History:   Procedure Laterality Date   • ADENOIDECTOMY     • APPENDECTOMY     • CARDIOVERSION     • KNEE ACL RECONSTRUCTION Right 01/18/2022    Procedure: KNEE ANTERIOR CRUCIATE LIGAMENT RECONSTRUCTION WITH AUTOGRAFT, RIGHT KNEE ARTHROSCOPY   ;  Surgeon: Marta Lal MD;  Location: [facility] OR [facility];  Service: Orthopedics;  Laterality: Right;   • TONSILLECTOMY          Social History     Occupational History   • 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[employer])   Tobacco Use   • Smoking status: Never   • Smokeless tobacco: Never   Vaping Use   • Vaping Use: Never used   Substance and Sexual Activity   • Alcohol use: Yes     Comment: OCCASIONAL    • Drug use: No     Comment: former casual marijuana use (more than 2 years ago)   • Sexual activity: Defer     Partners: Female      Social History     Social History Narrative   • Not on file        Family History   Problem Relation Age of Onset   • Lung cancer Mother 49        smoker   • Graves' disease Mother    • Lung cancer Father 52        smoker   • Other Brother         prediabetes   • Coronary artery disease Paternal Grandmother    • Thyroid disease Paternal Grandmother    • Suicidality Maternal Uncle         committed suicide   • Malig Hyperthermia Neg Hx    • Prostate cancer Neg Hx    • Colon cancer Neg Hx        Review of Systems      Physical Exam: 32 y.o. male  General Appearance:    Alert, cooperative, in no acute distress                      Vitals:    06/13/23 0545   BP: 118/94   BP Location: Right arm   Patient Position: Sitting   Pulse: 56   Resp: 16   Temp: 97.7 °F (36.5 °C)   TempSrc: Oral   SpO2: 98%        Head:  Normocephalic, without obvious abnormality, atraumatic   Eyes:          Conjunctivae and sclerae normal, no pallor, corneas clear,    Ears:  Ears appear intact with no abnormalities noted   Throat: No oral lesions, no thrush, oral mucosa moist   Neck: No adenopathy, supple, trachea midline, no thyromegaly,    Back:   No kyphosis present, no scoliosis present, no skin lesions,      erythema or scars, no tenderness to percussion or                   palpation,range of motion normal   Lungs:   C respirations regular, even and                 unlabored    Heart:  Regular rhythm and normal rate               Chest Wall:  No abnormalities observed               Pulses: Pulses palpable and equal bilaterally   Skin: No bleeding, bruising or rash   Lymph nodes: No palpable  adenopathy   Neurologic: Appears neurologic intact             Assessment:    Chronic pain of right knee    Tear of medial meniscus of right knee, current          Plan: All risks, benefits and alternatives were discussed.  Risks including but not exclusive to anesthetic complications, including death, MI, CVA, infection, bleeding DVT, PE,  fracture, residual pain and need for future surgery.  Patient understood all and agrees to proceed.

## 2023-06-13 NOTE — ANESTHESIA POSTPROCEDURE EVALUATION
Patient: Shaheen Rivera    Procedure Summary       Date: 06/13/23 Room / Location:  [facility] OR [address] [place] [address] [facility]    Anesthesia Start: 0657 Anesthesia Stop: 0749    Procedure: KNEE ARTHROSCOPY with partial lateral menisectomy with debridement of medial femoral condyle  need s note barrett cardio about bl thinner (Right: Knee) Diagnosis:       Chronic pain of right knee      Other tear of medial meniscus of right knee as current injury, subsequent encounter      (Chronic pain of right knee [M25.561, G89.29])      (Other tear of medial meniscus of right knee as current injury, subsequent encounter [S83.241D])    Surgeons: Marta Lal MD Provider: Juan Klein MD    Anesthesia Type: general ASA Status: 3            Anesthesia Type: general    Vitals  Vitals Value Taken Time   /64 06/13/23 0815   Temp 36.5 °C (97.7 °F) 06/13/23 0747   Pulse 41 06/13/23 0821   Resp 16 06/13/23 0815   SpO2 98 % 06/13/23 0821   Vitals shown include unvalidated device data.        Post Anesthesia Care and Evaluation    Patient location during evaluation: bedside  Pain management: adequate    Airway patency: patent  Anesthetic complications: No anesthetic complications    Cardiovascular status: acceptable  Respiratory status: acceptable  Hydration status: acceptable    Comments: /64   Pulse 55   Temp 36.5 °C (97.7 °F) (Oral)   Resp 16   SpO2 99%       
Universal Safety Interventions

## 2023-06-19 ENCOUNTER — DOCUMENTATION (OUTPATIENT)
Dept: PHYSICAL THERAPY | Facility: CLINIC | Age: 33
End: 2023-06-19
Payer: COMMERCIAL

## 2023-06-19 DIAGNOSIS — G89.29 CHRONIC PAIN OF RIGHT KNEE: ICD-10-CM

## 2023-06-19 DIAGNOSIS — M21.41 PES PLANUS OF BOTH FEET: ICD-10-CM

## 2023-06-19 DIAGNOSIS — R29.898 WEAKNESS OF RIGHT LOWER EXTREMITY: ICD-10-CM

## 2023-06-19 DIAGNOSIS — Z98.890 S/P ACL RECONSTRUCTION: Primary | ICD-10-CM

## 2023-06-19 DIAGNOSIS — R26.9 GAIT DISTURBANCE: ICD-10-CM

## 2023-06-19 DIAGNOSIS — R53.1 STRENGTH LOSS OF: ICD-10-CM

## 2023-06-19 DIAGNOSIS — M25.561 CHRONIC PAIN OF RIGHT KNEE: ICD-10-CM

## 2023-06-19 DIAGNOSIS — M21.42 PES PLANUS OF BOTH FEET: ICD-10-CM

## 2023-06-19 NOTE — PROGRESS NOTES
Closure of Physical Therapy Encounter    Shaheen Rivera has been seen for 52 physical therapy sessions for s/p ACL repair in Jan of 2022. Treatment has included therapeutic exercise, manual therapy, therapeutic activity, neuro-muscular retraining , gait training, ultrasound, patient education with home exercise program , orthotic fabrication and dry needling . Progress to physical therapy goals is slow. Shaheen has good knee ROM, but continues with VMO weakness. He has also had medial R knee pain for the past couple of months, limiting his WBing activities. He is (+) for medial meniscus pain with McMurrays, Apley's, and with varus and valgus stresses with knee in full extension. He has difficulty with stairs, squatting, and is unable to kneel on the R knee. Pt had a second surgery a week ago. DC this current episode.         Khushi Garcia, PT  Physical Therapist

## 2023-07-31 ENCOUNTER — TREATMENT (OUTPATIENT)
Dept: PHYSICAL THERAPY | Facility: CLINIC | Age: 33
End: 2023-07-31
Payer: OTHER MISCELLANEOUS

## 2023-07-31 DIAGNOSIS — Z98.890 HISTORY OF REPAIR OF ACL: ICD-10-CM

## 2023-07-31 DIAGNOSIS — M25.561 ACUTE PAIN OF RIGHT KNEE: Primary | ICD-10-CM

## 2023-07-31 DIAGNOSIS — R26.9 GAIT DISTURBANCE: ICD-10-CM

## 2023-07-31 DIAGNOSIS — R29.898 WEAKNESS OF RIGHT LOWER EXTREMITY: ICD-10-CM

## 2023-07-31 DIAGNOSIS — Z98.890 S/P RIGHT KNEE ARTHROSCOPY: ICD-10-CM

## 2023-07-31 PROCEDURE — 97530 THERAPEUTIC ACTIVITIES: CPT | Performed by: PHYSICAL THERAPIST

## 2023-07-31 PROCEDURE — 97110 THERAPEUTIC EXERCISES: CPT | Performed by: PHYSICAL THERAPIST

## 2023-08-01 NOTE — PROGRESS NOTES
Physical Therapy Daily Progress Note    Patient: Shaheen Rivera   : 1990  Diagnosis/ICD-10 Code:  Acute pain of right knee [M25.561]  Referring practitioner: Marta Lal MD  Date of Initial Visit: Type: THERAPY  Noted: 2022  Today's Date: 3/9/2022  Patient seen for 15 sessions           Subjective just keep working on my exercises    Objective     Exercise:  -NuStep, level 7 x10 min (on his own)  -Retro walking on the TM working on quad contraction, 8 min at 0.6 to 0.8 MPH  -[place] leg press (from 90 deg to 5 deg), 1x20 B, 100 lb, 2x 20 100lb  working on control  -Matrix hamstring curl, single leg, 15lb 4x10  -4 inch fwd step up, 2x10  -4 inch step up on L LE with R hip/knee flexion x20  -Quad isometrics at 90 deg, 10x 10 sec and 60 deg, 10x 10 sec with tactile cues of the VMO  -mini squats with hold, 2x15  -TKE with green band, 2x10 holding 5 sec  -hamstring and calf stretching 3x20 sec    Assessment/Plan  Continuing to work on quad engagement with isometrics at 90 and 60 deg of flexion, so less strain on the ACL graft. He is very diligent with his HEP and riding his bike.          Timed:    Manual Therapy:         mins  44928;  Therapeutic Exercise:    25     mins  00571;     Neuromuscular Soumya:    10    mins  90759;    Therapeutic Activity:     10     mins  73012;     Gait Training:           mins  87225;     Ultrasound:          mins  97679;    Electrical Stimulation:         mins  83826 ( );  Iontophoresis         mins 79266;  Aquatic Therapy         mins 13215;  Dry Needling                   mins 54708/  (Self-pay)    Untimed:  Electrical Stimulation:         mins  85584 ( );  Traction:         mins  22027;     Timed Treatment:  45    mins   Total Treatment:     45   mins    Khushi Garcia PT, CDNT  Physical Therapist    [place] License:[id number]   Awake/Alert

## 2023-08-03 ENCOUNTER — TREATMENT (OUTPATIENT)
Dept: PHYSICAL THERAPY | Facility: CLINIC | Age: 33
End: 2023-08-03
Payer: OTHER MISCELLANEOUS

## 2023-08-03 DIAGNOSIS — Z98.890 HISTORY OF REPAIR OF ACL: ICD-10-CM

## 2023-08-03 DIAGNOSIS — Z98.890 S/P RIGHT KNEE ARTHROSCOPY: ICD-10-CM

## 2023-08-03 DIAGNOSIS — M25.561 ACUTE PAIN OF RIGHT KNEE: Primary | ICD-10-CM

## 2023-08-03 DIAGNOSIS — R29.898 WEAKNESS OF RIGHT LOWER EXTREMITY: ICD-10-CM

## 2023-08-03 DIAGNOSIS — R26.9 GAIT DISTURBANCE: ICD-10-CM

## 2023-08-03 PROCEDURE — 97110 THERAPEUTIC EXERCISES: CPT | Performed by: PHYSICAL THERAPIST

## 2023-08-03 PROCEDURE — 97530 THERAPEUTIC ACTIVITIES: CPT | Performed by: PHYSICAL THERAPIST

## 2023-08-17 ENCOUNTER — TREATMENT (OUTPATIENT)
Dept: PHYSICAL THERAPY | Facility: CLINIC | Age: 33
End: 2023-08-17
Payer: OTHER MISCELLANEOUS

## 2023-08-17 DIAGNOSIS — M25.561 ACUTE PAIN OF RIGHT KNEE: Primary | ICD-10-CM

## 2023-08-17 DIAGNOSIS — R29.898 WEAKNESS OF RIGHT LOWER EXTREMITY: ICD-10-CM

## 2023-08-17 DIAGNOSIS — R26.9 GAIT DISTURBANCE: ICD-10-CM

## 2023-08-17 DIAGNOSIS — Z98.890 HISTORY OF REPAIR OF ACL: ICD-10-CM

## 2023-08-17 DIAGNOSIS — Z98.890 S/P RIGHT KNEE ARTHROSCOPY: ICD-10-CM

## 2023-08-17 PROCEDURE — 97110 THERAPEUTIC EXERCISES: CPT | Performed by: PHYSICAL THERAPIST

## 2023-08-17 PROCEDURE — 97530 THERAPEUTIC ACTIVITIES: CPT | Performed by: PHYSICAL THERAPIST

## 2023-08-17 NOTE — PROGRESS NOTES
Physical Therapy Daily Treatment Note    Patient: Shaheen Rivera   : 1990  Diagnosis/ICD-10 Code:  Acute pain of right knee [M25.561]  Referring practitioner: Marta Lal MD  Date of Initial Visit: Type: THERAPY  Noted: 2023  Today's Date: 2023  Patient seen for 11 sessions    [facility] [place] [address]  [phone] (phone)  [phone] (fax)         Subjective Knee feeling much better, walking about 30 plus minutes daily, continuing with [place] 4 days a week.     Objective     Exercises:  -walk on TM prior to session 15 min  -[place] single leg press, 170lb 3x20,  -Matrix hamstring curls, 25lb, 3x20on R L    -Matrix hip abd, 80lb 3x20    DEFER  -Matrix hip add, 60lb, 2x20   DEFER  -6 inch step ups fwd and lateral x20  -6 in step downs x20  -SL mini squat, large Swiss ball between L hip and wall, 2x10  -cable side stepping, 12.5lb x4 each side  -TKE, blue band, 20x     -touch and go from lowest level of mat table 3x10   -HS and calf stretching, 3x20 sec     Assessment/Plan  Working on more balance CKC today, SL mini squat using Swiss ball to engage glutes more. He had no pain with this, 6 inch step down non painful, touch and go squats from 22inch height, good. Will start working on light plyometrics in the brace next visit         Timed:    Manual Therapy:         mins  39245;  Therapeutic Exercise:    28     mins  15792;     Neuromuscular Soumya:        mins  92001;    Therapeutic Activity:     15     mins  69824;     Gait Training:           mins  19822;     Ultrasound:          mins  29421;    Electrical Stimulation:         mins  51364 ( );  Iontophoresis         mins 89750;  Aquatic Therapy         mins 86640;      Untimed:  Electrical Stimulation:         mins  39863 ( );  Traction:         mins  02016;   Dry Needling   (1-2 muscles)             mins 16932 (Self-pay)  Dry Needling (3-4 muscles)           mins  87113 (Self-pay)  Dry Needling Trial              mins DRYNDLTRIAL  (No Charge)    Timed Treatment:   43   mins   Total Treatment:     43   mins    Khushi Garcia PT, CDNT  Physical Therapist    [place] License:[id number]

## 2023-08-22 ENCOUNTER — TREATMENT (OUTPATIENT)
Dept: PHYSICAL THERAPY | Facility: CLINIC | Age: 33
End: 2023-08-22
Payer: OTHER MISCELLANEOUS

## 2023-08-22 DIAGNOSIS — R26.9 GAIT DISTURBANCE: ICD-10-CM

## 2023-08-22 DIAGNOSIS — Z98.890 S/P RIGHT KNEE ARTHROSCOPY: ICD-10-CM

## 2023-08-22 DIAGNOSIS — R29.898 WEAKNESS OF RIGHT LOWER EXTREMITY: ICD-10-CM

## 2023-08-22 DIAGNOSIS — M25.561 ACUTE PAIN OF RIGHT KNEE: Primary | ICD-10-CM

## 2023-08-22 DIAGNOSIS — Z98.890 HISTORY OF REPAIR OF ACL: ICD-10-CM

## 2023-08-22 PROCEDURE — 97110 THERAPEUTIC EXERCISES: CPT | Performed by: PHYSICAL THERAPIST

## 2023-08-22 NOTE — PROGRESS NOTES
Physical Therapy Daily Treatment Note    Patient: Shaheen Rivera   : 1990  Diagnosis/ICD-10 Code:  Acute pain of right knee [M25.561]  Referring practitioner: Marta Lal MD  Date of Initial Visit: Type: THERAPY  Noted: 2023  Today's Date: 2023  Patient seen for 12 sessions    [facility] [place] [address]  [phone] (phone)  [phone] (fax)         Subjective  I have to leave early for a work meeting at noon    Objective     Exercises:  -walk on TM prior to session 15 min  -[place] single leg press, 170lb 3x20,  -Matrix hamstring curls, 25lb, 3x20 on R LE    -Matrix hip abd, 80lb 3x20      -Matrix hip add, 55lb, 2x20     -6 inch step ups fwd and lateral x20  DEFER  -6 in step downs x20  DEFER  -SL mini squat, large Swiss ball between L hip and wall, 2x10  DEFER  -cable side stepping, 12.5lb x4 each side  DEFER  -TKE, blue band, 20x     DEFER  -touch and go from lowest level of mat table 3x10   -HS and calf stretching, 3x20 sec     Assessment/Plan  Pt has been having pain in the medial compartment of the R knee at times. He can walk for about 45 min on the TM without difficulty, but it will have pain in it randomly just sitting on the couch. Discussed that it does take about 4 months for th knee to regenerate synovial fluid, and likely some pain while healing. No clicking or popping. Being careful with twisting or pivoting          Timed:    Manual Therapy:         mins  83091;  Therapeutic Exercise:    30     mins  11719;     Neuromuscular Soumya:        mins  46154;    Therapeutic Activity:          mins  73021;     Gait Training:           mins  88690;     Ultrasound:          mins  84892;    Electrical Stimulation:         mins  55282 ( );  Iontophoresis         mins 59589;  Aquatic Therapy         mins 11998;      Untimed:  Electrical Stimulation:         mins  08898 ( );  Traction:         mins  02335;   Dry  Needling   (1-2 muscles)             mins 20560 (Self-pay)  Dry Needling (3-4 muscles)           mins 20561 (Self-pay)  Dry Needling Trial              mins DRYNDLTRIAL  (No Charge)    Timed Treatment:   30   mins   Total Treatment:     30   mins    Khushi Garcia PT, CDNT  Physical Therapist    [place] License:[id number]

## 2023-08-29 ENCOUNTER — TREATMENT (OUTPATIENT)
Dept: PHYSICAL THERAPY | Facility: CLINIC | Age: 33
End: 2023-08-29
Payer: OTHER MISCELLANEOUS

## 2023-08-29 DIAGNOSIS — R26.9 GAIT DISTURBANCE: ICD-10-CM

## 2023-08-29 DIAGNOSIS — R29.898 WEAKNESS OF RIGHT LOWER EXTREMITY: ICD-10-CM

## 2023-08-29 DIAGNOSIS — M25.561 ACUTE PAIN OF RIGHT KNEE: Primary | ICD-10-CM

## 2023-08-29 DIAGNOSIS — Z98.890 S/P RIGHT KNEE ARTHROSCOPY: ICD-10-CM

## 2023-08-29 DIAGNOSIS — Z98.890 HISTORY OF REPAIR OF ACL: ICD-10-CM

## 2023-08-29 PROCEDURE — 97530 THERAPEUTIC ACTIVITIES: CPT | Performed by: PHYSICAL THERAPIST

## 2023-08-29 PROCEDURE — 97112 NEUROMUSCULAR REEDUCATION: CPT | Performed by: PHYSICAL THERAPIST

## 2023-08-29 PROCEDURE — 97110 THERAPEUTIC EXERCISES: CPT | Performed by: PHYSICAL THERAPIST

## 2023-08-29 NOTE — PROGRESS NOTES
60-Day / 10-Visit Progress Note         Patient: Shaheen Rivera   : 1990  Diagnosis/ICD-10 Code:  Acute pain of right knee [M25.561]  Referring practitioner: Marta Lal MD  Date of Initial Visit: Type: THERAPY  Noted: 2023  Today's Date: 2023  Patient seen for 13 sessions    [facility] [place] [address]  [phone] (phone)  [phone] (fax)    Subjective:     Clinical Progress: improved  Home Program Compliance: Yes  Treatment has included:  therapeutic exercise, therapeutic activity, neuro-muscular retraining , gait training, and patient education with home exercise program     Subjective it still bothers me at times, some swelling  Objective          Tenderness     Right Knee   Tenderness in the medial joint line (near incision).     Strength/Myotome Testing     Right Hip   Planes of Motion   Flexion: 5  Extension: 4+  Abduction: 4+  Adduction: 5  External rotation: 4+    Right Knee   Flexion: 4+  Right knee extension strength: 4 to 4+      Exercises:  -walk on TM prior to session 15 min  -6 inch step ups fwd and lateral x20    -single leg deal lift (10lb, light touch when performing on R LE for balance) 2x10  -side stepping, blue band at ankle, 2x10  -monster walk, blue band at ankle 2x10  -4 in step downs x20    -SL mini squat, large Swiss ball between L hip and wall, 2x10  DEFER  -cable side stepping, 12.5lb x4 each side  DEFER  -TKE, blue band, 20x     DEFER  -touch and go from lowest level of mat table 3x10   -HS and calf stretching, 3x20 sec       Functional Outcome Score:   Knee Outcome Survey=52/80 or 65%    Assessment & Plan       Assessment  Assessment details: Shaheen Rivera has been seen for 13 physical therapy sessions for s/p R knee scope.  Treatment has included therapeutic exercise, therapeutic activity, neuro-muscular retraining , gait training, and patient education with home exercise program .  Progress to physical therapy goals is good. Shaheen continues to make progress with his strength. He is able to ascend stairs reciprocally and can descend, with use of handrail. He does have some continued hip and quad weakness, making activities in SLS more difficult. Will need to improve on this before doing plyometrics or jogging.  He will benefit from continued skilled physical therapy to address remaining impairments and functional limitations.       Prognosis: good    Goals  Plan Goals: ST wks  1. Patient will be independent with education for symptom management, joint protection and strategies to minimize stress on affected tissues (MET)   2. Patient will be able to ascend stairs reciprocally without pain to get to his apartment with greater ease (MET)   3. Pt to improve R knee ROM to 0-115 deg for improved gait pattern (MET)   4. Pt to report no more than 4/10 pain with walking on even ground (MET)      LT wks  1. Pt to improve R knee ROM to 3 deg of HE to 125 deg for improved gait pattern (PART MET) for ext, svsbpem=272 deg  2. Pt to improve score on Knee Outcome Survey from 64% to 90% for overall functional improvement (ONGOING) improved 65%  3. Patient will increase R knee and hip strength to 5/5 to improve functional mobility (ONGOING) (PART MET)   4. Patient will descend stairs reciprocally with rail support as needed without increased pain (PART MET)   5. Pt able to jog on the TM for 10 min without R knee pain and no compensation  (ONGOING)   6. Patient will demonstrate an independent HEP for core and knee strength and flexibility/ROM. (ONGOING)       Plan  Therapy options: will be seen for skilled therapy services  Frequency: 2x week  Duration in weeks: 8  Treatment plan discussed with: patient         Recommendations: Continue as planned  Timeframe: 2 months  Prognosis to achieve goals: good    PT Signature: Khushi Garcia, PT, CDNT    License Number: [id number]    Electronically signed by  Khushi Garcia, PT, 08/29/23, 11:24 AM EDT      Based upon review of the patient's progress and continued therapy plan, it is my medical opinion that Shaheen Rivera should continue physical therapy treatment at [facility]  [address] [place] [address] [address] [place] [address]  [phone].    Signature: __________________________________  Marta Lal MD    Timed:  Manual Therapy:         mins  09658;  Therapeutic Exercise:    8     mins  42654;     Neuromuscular Soumya:    12   mins  18010;    Therapeutic Activity:     23     mins  79869;     Gait Training:           mins  21592;     Ultrasound:          mins  33564;    Iontophoresis         mins 65281;    Untimed:  Electrical Stimulation:         mins  61590 ( );  Traction:         mins  47352;   Dry Needling   (1-2 muscles)            mins 20560 (Self-pay)  Dry Needling (3-4 muscles)             mins 20561 (Self-pay)  Dry Needling Trial                 mins DRYNDLTRIAL  (No Charge)    Timed Treatment:   43   mins   Total Treatment:     43   mins

## 2023-08-30 ENCOUNTER — TELEPHONE (OUTPATIENT)
Dept: ORTHOPEDIC SURGERY | Facility: CLINIC | Age: 33
End: 2023-08-30
Payer: COMMERCIAL

## 2023-08-30 DIAGNOSIS — Z98.890 S/P ARTHROSCOPY OF KNEE: Primary | ICD-10-CM

## 2023-08-30 NOTE — TELEPHONE ENCOUNTER
Dr. Lal  Patient is needing a new order sent to [facility] physical therapy so they can get more visits approved.  Dx-s/p arthroscopy of knee.

## 2023-08-31 ENCOUNTER — TREATMENT (OUTPATIENT)
Dept: PHYSICAL THERAPY | Facility: CLINIC | Age: 33
End: 2023-08-31
Payer: OTHER MISCELLANEOUS

## 2023-08-31 DIAGNOSIS — M25.561 ACUTE PAIN OF RIGHT KNEE: Primary | ICD-10-CM

## 2023-08-31 DIAGNOSIS — Z98.890 S/P RIGHT KNEE ARTHROSCOPY: ICD-10-CM

## 2023-08-31 DIAGNOSIS — R29.898 WEAKNESS OF RIGHT LOWER EXTREMITY: ICD-10-CM

## 2023-08-31 DIAGNOSIS — Z98.890 HISTORY OF REPAIR OF ACL: ICD-10-CM

## 2023-08-31 DIAGNOSIS — R26.9 GAIT DISTURBANCE: ICD-10-CM

## 2023-08-31 PROCEDURE — 97110 THERAPEUTIC EXERCISES: CPT | Performed by: PHYSICAL THERAPIST

## 2023-08-31 PROCEDURE — 97112 NEUROMUSCULAR REEDUCATION: CPT | Performed by: PHYSICAL THERAPIST

## 2023-08-31 PROCEDURE — 97530 THERAPEUTIC ACTIVITIES: CPT | Performed by: PHYSICAL THERAPIST

## 2023-08-31 NOTE — PROGRESS NOTES
Physical Therapy Daily Treatment Note    Patient: Shaheen Rivera   : 1990  Diagnosis/ICD-10 Code:  Acute pain of right knee [M25.561]  Referring practitioner: Marta Lal MD  Date of Initial Visit: Type: THERAPY  Noted: 2023  Today's Date: 2023  Patient seen for 14 sessions    [facility] [place] [address] [place] [address]  [phone] (phone)  [phone] (fax)         Subjective     Objective          Active Range of Motion     Right Knee   Flexion: 120 degrees   Extension: 3 (of HE) degrees      General Comments     Knee Comments  Leg girth 10cm above superior pole of patella  L=62.5 cm  R=53.0 cm      Ankle/Foot Comments   Calf girth, 10cm below the tibial tubercle  R=40.8cm  L =44.8cm       Exercises:  -walk on TM prior to session 15 min  -6 inch step ups fwd and lateral x20    -single leg deal lift (10lb, light touch when performing on R LE for balance) 2x10  -side stepping, blue band at ankle, 2x10  -monster walk, blue band at ankle 2x10  -4 in step downs x20   -Matrix HS curls, 30lb single leg 3x15  -Matrix hip abd, 60lb, 3x20   -SL mini squat, large Swiss ball between L hip and wall, 2x10  DEFER  -cable side stepping, 12.5lb x4 each side  DEFER  -TKE, blue band, 20x     DEFER  -touch and go from lowest level of mat table 3x10   -HS and calf stretching, 3x20 sec       Assessment/Plan  Continuing to work on core and LE strengthening, this needs to progress more before starting to do any plyometrics or jogging. Measured quad and calf girth, decreased some after this second surgery (about 2cm in each area), still significant difference between L and R thigh         Timed:    Manual Therapy:         mins  18973;  Therapeutic Exercise:    15     mins  94304;     Neuromuscular Soumya:    10    mins  51612;    Therapeutic Activity:     18     mins  92194;     Gait Training:           mins  68639;     Ultrasound:          mins  56666;    Electrical  Stimulation:         mins  46702 ( );  Iontophoresis         mins 05361;  Aquatic Therapy         mins 91735;      Untimed:  Electrical Stimulation:         mins  50753 ( );  Traction:         mins  81339;   Dry Needling   (1-2 muscles)             mins 20560 (Self-pay)  Dry Needling (3-4 muscles)           mins 20561 (Self-pay)  Dry Needling Trial              mins DRYNDLTRIAL  (No Charge)    Timed Treatment:   43   mins   Total Treatment:     43   mins    Khushi Garcia PT, CDNT  Physical Therapist    [place] License:[id number]

## 2023-09-07 ENCOUNTER — TREATMENT (OUTPATIENT)
Dept: PHYSICAL THERAPY | Facility: CLINIC | Age: 33
End: 2023-09-07
Payer: OTHER MISCELLANEOUS

## 2023-09-07 DIAGNOSIS — R26.9 GAIT DISTURBANCE: ICD-10-CM

## 2023-09-07 DIAGNOSIS — M25.561 ACUTE PAIN OF RIGHT KNEE: Primary | ICD-10-CM

## 2023-09-07 DIAGNOSIS — Z98.890 S/P RIGHT KNEE ARTHROSCOPY: ICD-10-CM

## 2023-09-07 DIAGNOSIS — R29.898 WEAKNESS OF RIGHT LOWER EXTREMITY: ICD-10-CM

## 2023-09-07 DIAGNOSIS — Z98.890 HISTORY OF REPAIR OF ACL: ICD-10-CM

## 2023-09-07 PROCEDURE — 97110 THERAPEUTIC EXERCISES: CPT | Performed by: PHYSICAL THERAPIST

## 2023-09-07 PROCEDURE — 97530 THERAPEUTIC ACTIVITIES: CPT | Performed by: PHYSICAL THERAPIST

## 2023-09-07 PROCEDURE — 97112 NEUROMUSCULAR REEDUCATION: CPT | Performed by: PHYSICAL THERAPIST

## 2023-09-07 NOTE — PROGRESS NOTES
Physical Therapy Daily Treatment Note    Patient: Shaheen Rivera   : 1990  Diagnosis/ICD-10 Code:  Acute pain of right knee [M25.561]  Referring practitioner: Marta Lal MD  Date of Initial Visit: Type: THERAPY  Noted: 2023  Today's Date: 2023  Patient seen for 15 sessions    [facility] [place] [address]  [phone] (phone)  [phone] (fax)         Subjective not having buckling in the knee, less swelling as well    Objective     Exercises:  -walk on TM prior to session 30 min  -6 inch step ups fwd and lateral x20    -single leg deal lift (10lb, light touch when performing on R LE for balance) 2x10  -side stepping, blue band at ankle, 2x10  -monster walk, blue band at ankle 2x10  -4 in step downs x20   -Matrix HS curls, 30lb single leg 3x15  -Matrix hip abd, 60lb, 3x20   -SL mini squat, large Swiss ball between L hip and wall, 2x10  DEFER  -cable side stepping, 12.5lb x4 each side  DEFER  -TKE, blue band, 20x     DEFER  -touch and go from lowest level of mat table 3x10   -HS and calf stretching, 3x20 sec        Assessment/Plan  Pt is having a little less pain with walking, no buckling as of late. He continues with decreased eccentric quad control and control with balance activities in CKC.          Timed:    Manual Therapy:         mins  78018;  Therapeutic Exercise:    8     mins  00758;     Neuromuscular Soumya:    12    mins  97884;    Therapeutic Activity:     23     mins  25137;     Gait Training:           mins  16823;     Ultrasound:          mins  94900;    Electrical Stimulation:         mins  24378 ( );  Iontophoresis         mins 42850;  Aquatic Therapy         mins 22988;      Untimed:  Electrical Stimulation:         mins  94466 ( );  Traction:         mins  60810;   Dry Needling   (1-2 muscles)             mins  (Self-pay)  Dry Needling (3-4 muscles)           mins  (Self-pay)  Dry Needling  Trial              mins DRYNDLTRIAL  (No Charge)    Timed Treatment:   43   mins   Total Treatment:     43   mins    Khushi Garcia PT, CDNT  Physical Therapist    [place] License:[id number]

## 2023-09-12 ENCOUNTER — TREATMENT (OUTPATIENT)
Dept: PHYSICAL THERAPY | Facility: CLINIC | Age: 33
End: 2023-09-12
Payer: OTHER MISCELLANEOUS

## 2023-09-12 DIAGNOSIS — M25.561 ACUTE PAIN OF RIGHT KNEE: Primary | ICD-10-CM

## 2023-09-12 DIAGNOSIS — R26.9 GAIT DISTURBANCE: ICD-10-CM

## 2023-09-12 DIAGNOSIS — R29.898 WEAKNESS OF RIGHT LOWER EXTREMITY: ICD-10-CM

## 2023-09-12 DIAGNOSIS — Z98.890 S/P RIGHT KNEE ARTHROSCOPY: ICD-10-CM

## 2023-09-12 DIAGNOSIS — Z98.890 HISTORY OF REPAIR OF ACL: ICD-10-CM

## 2023-09-12 PROCEDURE — 97530 THERAPEUTIC ACTIVITIES: CPT | Performed by: PHYSICAL THERAPIST

## 2023-09-12 PROCEDURE — 97110 THERAPEUTIC EXERCISES: CPT | Performed by: PHYSICAL THERAPIST

## 2023-09-12 PROCEDURE — 97112 NEUROMUSCULAR REEDUCATION: CPT | Performed by: PHYSICAL THERAPIST

## 2023-09-12 NOTE — PROGRESS NOTES
Physical Therapy Daily Treatment Note    Patient: Shaheen Rivera   : 1990  Diagnosis/ICD-10 Code:  Acute pain of right knee [M25.561]  Referring practitioner: Marta Lal MD  Date of Initial Visit: Type: THERAPY  Noted: 2023  Today's Date: 2023  Patient seen for 16 sessions    [facility] [place] [address]  [phone] (phone)  [phone] (fax)         Subjective doing ok today    Objective     Exercises:  -walk on TM prior to session 40 min  -6 inch step ups fwd and lateral x20    -single leg deal lift (10lb, light touch when performing on R LE for balance) 2x10  -side stepping, blue band at ankle, 2x10  -monster walk, blue band at ankle 2x10  -4 in step downs x20   -goblet squats (10lb wt) 2x10 (cues for Wbing through whole foot to engage the quad more)  -SLS with ball throws, 3x30 sec on each LE (mild to mod compensation for SLS at times)  -glute med hip hike on 1-2 inch book, 3x10 for each LE  -HS and calf stretching, 3x20 sec         Assessment/Plan  Continuing to work on core and LE strength, balance and CKC activities to improve stability of hip/knees. Improved ecc control on the 4 inch step down         Timed:    Manual Therapy:         mins  33483;  Therapeutic Exercise:    8     mins  67245;     Neuromuscular Soumya:    23    mins  26007;    Therapeutic Activity:     13     mins  95833;     Gait Training:           mins  28680;     Ultrasound:          mins  18273;    Electrical Stimulation:         mins  13949 ( );  Iontophoresis         mins 10402;  Aquatic Therapy         mins 70296;      Untimed:  Electrical Stimulation:         mins  60163 ( );  Traction:         mins  36988;   Dry Needling   (1-2 muscles)             mins  (Self-pay)  Dry Needling (3-4 muscles)           mins  (Self-pay)  Dry Needling Trial              mins DRYNDLTRIAL  (No Charge)    Timed Treatment:   44   mins   Total  Treatment:     44   mins    Khushi Garcia PT, CDNT  Physical Therapist    [place] License:[id number]

## 2023-09-15 ENCOUNTER — TREATMENT (OUTPATIENT)
Dept: PHYSICAL THERAPY | Facility: CLINIC | Age: 33
End: 2023-09-15
Payer: OTHER MISCELLANEOUS

## 2023-09-15 DIAGNOSIS — Z98.890 S/P RIGHT KNEE ARTHROSCOPY: ICD-10-CM

## 2023-09-15 DIAGNOSIS — R29.898 WEAKNESS OF RIGHT LOWER EXTREMITY: ICD-10-CM

## 2023-09-15 DIAGNOSIS — R26.9 GAIT DISTURBANCE: ICD-10-CM

## 2023-09-15 DIAGNOSIS — Z98.890 HISTORY OF REPAIR OF ACL: ICD-10-CM

## 2023-09-15 DIAGNOSIS — Z00.00 ENCOUNTER FOR ANNUAL HEALTH EXAMINATION: ICD-10-CM

## 2023-09-15 DIAGNOSIS — M25.561 ACUTE PAIN OF RIGHT KNEE: Primary | ICD-10-CM

## 2023-09-15 LAB
ALBUMIN SERPL-MCNC: 4.9 G/DL (ref 3.5–5.2)
ALBUMIN/GLOB SERPL: 2.3 G/DL
ALP SERPL-CCNC: 66 U/L (ref 39–117)
ALT SERPL-CCNC: 43 U/L (ref 1–41)
APPEARANCE UR: CLEAR
AST SERPL-CCNC: 30 U/L (ref 1–40)
BASOPHILS # BLD AUTO: 0.17 10*3/MM3 (ref 0–0.2)
BASOPHILS NFR BLD AUTO: 2.6 % (ref 0–1.5)
BILIRUB SERPL-MCNC: 0.6 MG/DL (ref 0–1.2)
BILIRUB UR QL STRIP: NEGATIVE
BUN SERPL-MCNC: 13 MG/DL (ref 6–20)
BUN/CREAT SERPL: 10.6 (ref 7–25)
CALCIUM SERPL-MCNC: 9.8 MG/DL (ref 8.6–10.5)
CHLORIDE SERPL-SCNC: 104 MMOL/L (ref 98–107)
CHOLEST SERPL-MCNC: 121 MG/DL (ref 0–200)
CHOLEST/HDLC SERPL: 3.67 {RATIO}
CO2 SERPL-SCNC: 28.2 MMOL/L (ref 22–29)
COLOR UR: YELLOW
CREAT SERPL-MCNC: 1.23 MG/DL (ref 0.76–1.27)
EGFRCR SERPLBLD CKD-EPI 2021: 80 ML/MIN/1.73
EOSINOPHIL # BLD AUTO: 0.27 10*3/MM3 (ref 0–0.4)
EOSINOPHIL NFR BLD AUTO: 4.1 % (ref 0.3–6.2)
ERYTHROCYTE [DISTWIDTH] IN BLOOD BY AUTOMATED COUNT: 12.8 % (ref 12.3–15.4)
GLOBULIN SER CALC-MCNC: 2.1 GM/DL
GLUCOSE SERPL-MCNC: 99 MG/DL (ref 65–99)
GLUCOSE UR QL STRIP: NEGATIVE
HBA1C MFR BLD: 5.4 % (ref 4.8–5.6)
HCT VFR BLD AUTO: 51.7 % (ref 37.5–51)
HDLC SERPL-MCNC: 33 MG/DL (ref 40–60)
HGB BLD-MCNC: 17.5 G/DL (ref 13–17.7)
HGB UR QL STRIP: NEGATIVE
IMM GRANULOCYTES # BLD AUTO: 0.03 10*3/MM3 (ref 0–0.05)
IMM GRANULOCYTES NFR BLD AUTO: 0.5 % (ref 0–0.5)
KETONES UR QL STRIP: NEGATIVE
LDLC SERPL CALC-MCNC: 70 MG/DL (ref 0–100)
LEUKOCYTE ESTERASE UR QL STRIP: NEGATIVE
LYMPHOCYTES # BLD AUTO: 1.95 10*3/MM3 (ref 0.7–3.1)
LYMPHOCYTES NFR BLD AUTO: 29.6 % (ref 19.6–45.3)
MCH RBC QN AUTO: 31.4 PG (ref 26.6–33)
MCHC RBC AUTO-ENTMCNC: 33.8 G/DL (ref 31.5–35.7)
MCV RBC AUTO: 92.7 FL (ref 79–97)
MONOCYTES # BLD AUTO: 0.46 10*3/MM3 (ref 0.1–0.9)
MONOCYTES NFR BLD AUTO: 7 % (ref 5–12)
NEUTROPHILS # BLD AUTO: 3.7 10*3/MM3 (ref 1.7–7)
NEUTROPHILS NFR BLD AUTO: 56.2 % (ref 42.7–76)
NITRITE UR QL STRIP: NEGATIVE
NRBC BLD AUTO-RTO: 0 /100 WBC (ref 0–0.2)
PH UR STRIP: 7 [PH] (ref 5–8)
PLATELET # BLD AUTO: 203 10*3/MM3 (ref 140–450)
POTASSIUM SERPL-SCNC: 4.4 MMOL/L (ref 3.5–5.2)
PROT SERPL-MCNC: 7 G/DL (ref 6–8.5)
PROT UR QL STRIP: NEGATIVE
RBC # BLD AUTO: 5.58 10*6/MM3 (ref 4.14–5.8)
SODIUM SERPL-SCNC: 141 MMOL/L (ref 136–145)
SP GR UR STRIP: 1.01 (ref 1–1.03)
T4 FREE SERPL-MCNC: 1.18 NG/DL (ref 0.93–1.7)
TRIGL SERPL-MCNC: 94 MG/DL (ref 0–150)
TSH SERPL DL<=0.005 MIU/L-ACNC: 1.7 UIU/ML (ref 0.27–4.2)
UROBILINOGEN UR STRIP-MCNC: NORMAL MG/DL
VLDLC SERPL CALC-MCNC: 18 MG/DL (ref 5–40)
WBC # BLD AUTO: 6.58 10*3/MM3 (ref 3.4–10.8)

## 2023-09-15 PROCEDURE — 97110 THERAPEUTIC EXERCISES: CPT | Performed by: PHYSICAL THERAPIST

## 2023-09-15 PROCEDURE — 97112 NEUROMUSCULAR REEDUCATION: CPT | Performed by: PHYSICAL THERAPIST

## 2023-09-15 PROCEDURE — 97530 THERAPEUTIC ACTIVITIES: CPT | Performed by: PHYSICAL THERAPIST

## 2023-09-22 ENCOUNTER — OFFICE VISIT (OUTPATIENT)
Dept: INTERNAL MEDICINE | Age: 33
End: 2023-09-22
Payer: COMMERCIAL

## 2023-09-22 ENCOUNTER — TREATMENT (OUTPATIENT)
Dept: PHYSICAL THERAPY | Facility: CLINIC | Age: 33
End: 2023-09-22
Payer: OTHER MISCELLANEOUS

## 2023-09-22 VITALS
OXYGEN SATURATION: 98 % | SYSTOLIC BLOOD PRESSURE: 110 MMHG | DIASTOLIC BLOOD PRESSURE: 80 MMHG | HEIGHT: 71 IN | TEMPERATURE: 97.3 F | HEART RATE: 69 BPM | WEIGHT: 254 LBS | BODY MASS INDEX: 35.56 KG/M2

## 2023-09-22 DIAGNOSIS — R26.9 GAIT DISTURBANCE: ICD-10-CM

## 2023-09-22 DIAGNOSIS — Z98.890 S/P RIGHT KNEE ARTHROSCOPY: ICD-10-CM

## 2023-09-22 DIAGNOSIS — Z99.89 OSA ON CPAP: Chronic | ICD-10-CM

## 2023-09-22 DIAGNOSIS — G47.33 OSA ON CPAP: Chronic | ICD-10-CM

## 2023-09-22 DIAGNOSIS — E66.01 CLASS 2 SEVERE OBESITY DUE TO EXCESS CALORIES WITH SERIOUS COMORBIDITY AND BODY MASS INDEX (BMI) OF 35.0 TO 35.9 IN ADULT: ICD-10-CM

## 2023-09-22 DIAGNOSIS — R29.898 WEAKNESS OF RIGHT LOWER EXTREMITY: ICD-10-CM

## 2023-09-22 DIAGNOSIS — I48.0 PAROXYSMAL ATRIAL FIBRILLATION: Chronic | ICD-10-CM

## 2023-09-22 DIAGNOSIS — Z00.00 ENCOUNTER FOR ANNUAL HEALTH EXAMINATION: Primary | ICD-10-CM

## 2023-09-22 DIAGNOSIS — Z98.890 HISTORY OF REPAIR OF ACL: ICD-10-CM

## 2023-09-22 DIAGNOSIS — E29.1 HYPOGONADISM IN MALE: Chronic | ICD-10-CM

## 2023-09-22 DIAGNOSIS — M25.561 ACUTE PAIN OF RIGHT KNEE: Primary | ICD-10-CM

## 2023-09-22 PROBLEM — B00.9 HSV-2 INFECTION: Status: RESOLVED | Noted: 2019-01-09 | Resolved: 2023-09-22

## 2023-09-22 PROBLEM — E66.812 CLASS 2 SEVERE OBESITY DUE TO EXCESS CALORIES WITH SERIOUS COMORBIDITY AND BODY MASS INDEX (BMI) OF 35.0 TO 35.9 IN ADULT: Status: ACTIVE | Noted: 2023-09-22

## 2023-09-22 PROBLEM — E66.812 CLASS 2 SEVERE OBESITY DUE TO EXCESS CALORIES WITH SERIOUS COMORBIDITY AND BODY MASS INDEX (BMI) OF 35.0 TO 35.9 IN ADULT: Chronic | Status: ACTIVE | Noted: 2023-09-22

## 2023-09-22 PROCEDURE — 97110 THERAPEUTIC EXERCISES: CPT | Performed by: PHYSICAL THERAPIST

## 2023-09-22 PROCEDURE — 97112 NEUROMUSCULAR REEDUCATION: CPT | Performed by: PHYSICAL THERAPIST

## 2023-09-22 PROCEDURE — 97530 THERAPEUTIC ACTIVITIES: CPT | Performed by: PHYSICAL THERAPIST

## 2023-09-22 RX ORDER — SEMAGLUTIDE 0.25 MG/.5ML
0.25 INJECTION, SOLUTION SUBCUTANEOUS WEEKLY
Qty: 2 ML | Refills: 0 | Status: SHIPPED | OUTPATIENT
Start: 2023-09-22

## 2023-09-22 NOTE — ASSESSMENT & PLAN NOTE
Lab Results   Component Value Date    HGB 17.5 09/15/2023    HGB 16.9 07/14/2023    HGB 16.0 07/12/2023      Advised to discuss with urologist about secondary polycythemia. May need phlebotomy at regular intervals.

## 2023-09-22 NOTE — PROGRESS NOTES
Physical Therapy Daily Treatment Note    Patient: Shaheen Rivera   : 1990  Diagnosis/ICD-10 Code:  Acute pain of right knee [M25.561]  Referring practitioner: Marta Lal MD  Date of Initial Visit: Type: THERAPY  Noted: 2023  Today's Date: 2023  Patient seen for 18 sessions    Visit Diagnoses:    ICD-10-CM ICD-9-CM   1. Acute pain of right knee  M25.561 719.46   2. Weakness of right lower extremity  R29.898 729.89   3. Gait disturbance  R26.9 781.2   4. History of repair of ACL  Z98.890 V45.89   5. S/P right knee arthroscopy  Z98.890 V45.89       [facility] [place] [address]  [phone] (phone)  [phone] (fax)         Subjective I have been on prednisone this week (sick), and it did help the knee    Objective     Exercises/Therapeutic Activity/NM re-ed:  -walk on TM prior to session 40 min  -8 inch step ups fwd and lateral x20    -single leg deal lift (10lb, light touch when performing on R LE for balance) 2x10  -side stepping, blue band at ankle, 2x10  -monster walk, blue band at ankle 2x10  -4 in step downs x20   -goblet squats (10lb wt) 2x10 (cues for Wbing through whole foot to engage the quad more)  DEFER  -squats on rockerboard, 2x20  -SLS with ball throws, 3x30 sec on each LE (mild to mod compensation for SLS at times)  -glute med hip hike on 1-2 inch book, 3x10 for each LE  -HS and calf stretching, 3x20 sec      -tried kneeling on the mat table, moving back into prayer stretch to simulate prayer position     Assessment/Plan  Pt has been unable to kneel on his R knee for quite some time (since initial surgery 2022) for his [facility] services. Started working on that position today, ok on hands and knees, but as he started to glide back into prayer position, had some sharp pains. Going to work on [employer] with over pressure and supine wall slides with OP to try to see if that will help with positioning. He was on a  steroid pack this week for illness and did have some pain reduction in the knee         Timed:    Manual Therapy:         mins  82076;  Therapeutic Exercise:    8     mins  24280;     Neuromuscular Soumya:    13    mins  80969;    Therapeutic Activity:     23     mins  85382;     Gait Training:           mins  69211;     Ultrasound:          mins  26914;    Electrical Stimulation:         mins  72717 ( );  Iontophoresis         mins 75154;  Aquatic Therapy         mins 22902;      Untimed:  Electrical Stimulation:         mins  03067 ( );  Traction:         mins  68848;   Dry Needling   (1-2 muscles)             mins 20560 (Self-pay)  Dry Needling (3-4 muscles)           mins 20561 (Self-pay)  Dry Needling Trial              mins DRYNDLTRIAL  (No Charge)    Timed Treatment:  44    mins   Total Treatment:     44   mins    Khushi Garcia PT, CDNT  Physical Therapist    [place] License:[id number]

## 2023-09-22 NOTE — PROGRESS NOTES
"    I N T E R N A L  M E D I C I N E    J U N O H  K I M,  M D      ENCOUNTER DATE:  09/22/2023    Shaheen Rivera / 33 y.o. / male    CHIEF COMPLAINT     Annual Exam (9/22/22)      VITALS     Vitals:    09/22/23 1045   BP: 110/80   Pulse: 69   Temp: 97.3 °F (36.3 °C)   SpO2: 98%   Weight: 115 kg (254 lb)   Height: 180.3 cm (71\")       BP Readings from Last 3 Encounters:   09/22/23 110/80   06/13/23 119/47   06/06/23 119/70     Wt Readings from Last 3 Encounters:   09/22/23 115 kg (254 lb)   06/27/23 118 kg (259 lb 12.8 oz)   06/06/23 117 kg (258 lb)      Body mass index is 35.43 kg/m².    Blood pressure readings recorded on patient flowsheet:       No data to display                MEDICATIONS     Current Outpatient Medications on File Prior to Visit   Medication Sig Dispense Refill    albuterol sulfate  (90 Base) MCG/ACT inhaler Inhale 2 puffs orally every 4 hours as needed for wheezing or shortness of air. (Patient taking differently: Inhale 2 puffs Every 4 (Four) Hours As Needed.) 8.5 g 3    anastrozole (ARIMIDEX) 1 MG tablet Take 1 tablet by mouth Daily.      B-D 3CC LUER-IGNACIA SYR 42AA6-5/2 22G X 1-1/2\" 3 ML misc       BD Hypodermic Needle 18G X 1\" misc       carvedilol (COREG) 12.5 MG tablet Take 1.5 tablets by mouth 2 (Two) Times a Day.      cetirizine (zyrTEC) 10 MG tablet Take 1 tablet by mouth Every Night. (Patient taking differently: Take 1 tablet by mouth Every Morning.)      fluticasone (FLONASE) 50 MCG/ACT nasal spray Administer 1spray in each nostril twice daily.      furosemide (LASIX) 40 MG tablet Take 1 tablet by mouth 2 (Two) Times a Day.      rosuvastatin (CRESTOR) 10 MG tablet Take 1 tablet by mouth Every Night.      sacubitril-valsartan (ENTRESTO) 49-51 MG tablet Take 1 tablet by mouth 2 (Two) Times a Day.      spironolactone (ALDACTONE) 25 MG tablet Take 1 tablet by mouth Daily.      Testosterone Cypionate (DEPOTESTOTERONE CYPIONATE) 200 MG/ML injection Inject 0.5 mL into the appropriate " muscle as directed by prescriber 1 (One) Time Per Week.      Xarelto 20 MG tablet 1 tablet Daily With Dinner. INSTRUCTED TO HOLD PER MD 2 TO 3 DAYS BEFORE SURGERY      [DISCONTINUED] HYDROcodone-acetaminophen (NORCO) 5-325 MG per tablet Take 1 tablet by mouth Every 4 (Four) Hours As Needed for Severe Pain. 30 tablet 0    Cyanocobalamin (B-12-SL) 1000 MCG sublingual tablet Place 1 tablet under the tongue Daily. (Patient not taking: Reported on 2023)       No current facility-administered medications on file prior to visit.         HISTORY OF PRESENT ILLNESS      Shaheen presents for annual health maintenance visit.    Doing fine from cardiac standpoint with atrial fibrillation. Latest echo showed normal LVEF. He is frustrated with inability to lose weight. BMI is 35.43 and weight is 254 lbs. He eats fairly healthy and exercises regularly. He has history of hepatic steatosis, hyperlipidemia and GOKUL related to his weight. He is interested in GLP-1 agonist for weight loss. Denies personal history of pancreatitis and there is negative family history of MTC. Denies significant GI problems.     General health: multiple medical problems  Lifestyle:  Attempting to lose weight?: Yes   Diet: eats moderately healthy  Exercise: exercises 3-5 days weekly  Tobacco: Never used   Alcohol: does not drink  Work: Full-time  Reproductive health:  Sexually active?: No   Concern for STD?: No   Sexual problems?: No problems   Sees Urologist?: Yes for low testosterone   Depression Screenin/22/2023    10:48 AM   PHQ-2/PHQ-9 Depression Screening   Little Interest or Pleasure in Doing Things 0-->not at all   Feeling Down, Depressed or Hopeless 0-->not at all   PHQ-9: Brief Depression Severity Measure Score 0         PHQ-2: 0 (Not depressed)     PHQ-9: 0 (Negative screening for depression)    Patient Care Team:  Lico Golden MD as PCP - General (Internal Medicine)  Kim Mcmillan MD as Consulting Physician  (Cardiology)  Chacorta Salgado Jr., MD as Consulting Physician (Urology)  Sid Sue MD as Consulting Physician (Cardiology)  Marta Lal MD as Surgeon (Orthopedic Surgery)  ______________________________________________________________________    ALLERGIES  Allergies   Allergen Reactions    Amoxicillin-Pot Clavulanate Hives        PFSH:     The following portions of the patient's history were reviewed and updated as appropriate: Allergies / Current Medications / Past Medical History / Surgical History / Social History / Family History    PROBLEM LIST   Patient Active Problem List   Diagnosis    Vitamin B 12 deficiency    Hypogonadism in male    Retinitis pigmentosa of both eyes    ADHD (attention deficit hyperactivity disorder)    GOKUL on CPAP    Atrial fibrillation    Rupture of anterior cruciate ligament of right knee    Clinical diagnosis of COVID-19    Hyperlipidemia    Chronic pain of right knee    Tear of medial meniscus of right knee, current    Class 2 severe obesity due to excess calories with serious comorbidity and body mass index (BMI) of 35.0 to 35.9 in adult       PAST MEDICAL HISTORY  Past Medical History:   Diagnosis Date    ADHD     Asthma     Atrial fibrillation 12/28/2020    CHF (congestive heart failure)     Clinical diagnosis of COVID-19 12/23/2021    COVID     Monoclonal Antibody Infusion on 12/28/2021    COVID-19 vaccine administered     HSV-2 (herpes simplex virus 2) infection     HSV-2 infection 01/09/2019    Hx monoclonal drug therapy 12/23/2021    BAM-COVID TX    Low testosterone in male     Right knee pain     Sleep apnea     CPAP    Splenic infarct     Testosterone deficiency     Vitamin B 12 deficiency        SURGICAL HISTORY  Past Surgical History:   Procedure Laterality Date    ADENOIDECTOMY      APPENDECTOMY      CARDIOVERSION      KNEE ACL RECONSTRUCTION Right 01/18/2022    Procedure: KNEE ANTERIOR CRUCIATE LIGAMENT RECONSTRUCTION WITH AUTOGRAFT, RIGHT KNEE ARTHROSCOPY    ;  Surgeon: Marta Lal MD;  Location: [facility] OR [facility];  Service: Orthopedics;  Laterality: Right;    KNEE ARTHROSCOPY Right 6/13/2023    Procedure: KNEE ARTHROSCOPY with partial lateral menisectomy with debridement of medial femoral condyle  need s note barrett cardio about bl thinner;  Surgeon: Marta Lal MD;  Location: [facility] OR [facility];  Service: Orthopedics;  Laterality: Right;    TONSILLECTOMY         SOCIAL HISTORY  Social History     Socioeconomic History    Marital status: Single   Tobacco Use    Smoking status: Never    Smokeless tobacco: Never   Vaping Use    Vaping Use: Never used   Substance and Sexual Activity    Alcohol use: Yes     Comment: OCCASIONAL     Drug use: No     Comment: former casual marijuana use (more than 2 years ago)    Sexual activity: Not Currently     Partners: Female       FAMILY HISTORY  Family History   Problem Relation Age of Onset    Lung cancer Mother 49        smoker    Graves' disease Mother     Lung cancer Father 52        smoker    Diabetes type II Brother     Coronary artery disease Paternal Grandmother     Thyroid disease Paternal Grandmother     Suicidality Maternal Uncle         committed suicide    Malig Hyperthermia Neg Hx     Prostate cancer Neg Hx     Colon cancer Neg Hx        IMMUNIZATION HISTORY  Immunization History   Administered Date(s) Administered    COVID-19 (MODERNA) 1st,2nd,3rd Dose Monovalent 03/02/2021, 03/31/2021, 11/14/2021    Flu Vaccine Intradermal Quad 18-64YR 10/20/2007    Flu Vaccine Quad PF 6-35MO 12/01/2018    Flu Vaccine Quad PF >36MO 12/29/2017    Flu Vaccine Split Quad 10/20/2007    Fluzone (or Fluarix & Flulaval for VFC) >6mos 12/29/2017, 12/04/2018, 10/10/2021    Hpv9 10/10/2021    Influenza, Unspecified 12/28/2020, 03/31/2022    Pneumococcal Conjugate 20-Valent (PCV20) 09/22/2022    Pneumococcal Polysaccharide (PPSV23) 12/28/2020    TD Preservative Free (Tenivac) 09/22/2022    Tdap 10/01/2012         REVIEW OF SYSTEMS     Review of  Systems   Constitutional: Negative.    HENT: Negative.     Eyes: Negative.    Respiratory:  Positive for apnea (on cpap).    Cardiovascular: Negative.  Negative for chest pain and palpitations.   Gastrointestinal: Negative.    Endocrine: Negative.         On testosterone replacement    Genitourinary: Negative.    Musculoskeletal: Negative.    Skin: Negative.    Allergic/Immunologic: Negative.    Neurological: Negative.    Hematological: Negative.    Psychiatric/Behavioral: Negative.         PHYSICAL EXAMINATION     Physical Exam  Constitutional:       General: He is not in acute distress.     Appearance: He is well-developed. He is obese.   HENT:      Head: Normocephalic and atraumatic.      Right Ear: Tympanic membrane, ear canal and external ear normal.      Left Ear: Tympanic membrane, ear canal and external ear normal.      Mouth/Throat:      Mouth: Mucous membranes are moist.      Pharynx: Oropharynx is clear.   Eyes:      General: No scleral icterus.     Conjunctiva/sclera: Conjunctivae normal.      Pupils: Pupils are equal, round, and reactive to light.   Neck:      Thyroid: No thyroid mass or thyromegaly.      Vascular: No carotid bruit.      Trachea: No tracheal deviation.   Cardiovascular:      Rate and Rhythm: Normal rate and regular rhythm.      Pulses: Normal pulses.      Heart sounds: Normal heart sounds.      Comments: No carotid bruit  Pulmonary:      Effort: Pulmonary effort is normal.      Breath sounds: Normal breath sounds.   Abdominal:      General: Abdomen is protuberant. There is no distension.      Palpations: Abdomen is soft. There is no mass.      Tenderness: There is no abdominal tenderness.      Hernia: No hernia is present.   Musculoskeletal:      Cervical back: Neck supple.      Right lower leg: No edema.      Left lower leg: No edema.   Lymphadenopathy:      Cervical: No cervical adenopathy.      Upper Body:      Right upper body: No axillary adenopathy.      Left upper body: No  axillary adenopathy.   Skin:     General: Skin is warm.      Coloration: Skin is not jaundiced or pale.      Findings: No lesion (Negative for suspicious skin lesions/growths) or rash.      Comments: No jaundice  No suspicious skin lesions.    Neurological:      Mental Status: He is alert and oriented to person, place, and time.      Cranial Nerves: No cranial nerve deficit.      Motor: No abnormal muscle tone.      Deep Tendon Reflexes: Reflexes normal.   Psychiatric:         Mood and Affect: Mood normal.         Behavior: Behavior normal.         Thought Content: Thought content normal.         Judgment: Judgment normal.       REVIEWED DATA      Labs:    Lab Results   Component Value Date     09/15/2023    K 4.4 09/15/2023    CALCIUM 9.8 09/15/2023    AST 30 09/15/2023    ALT 43 (H) 09/15/2023    BUN 13 09/15/2023    CREATININE 1.23 09/15/2023    CREATININE 1.00 06/06/2023    CREATININE 1.24 09/22/2022    EGFRIFNONA 103 01/04/2022       Lab Results   Component Value Date    GLUCOSE 99 09/15/2023    HGBA1C 5.40 09/15/2023    HGBA1C 5.5 09/22/2022    HGBA1C 4.95 03/12/2018    TSH 1.700 09/15/2023    FREET4 1.18 09/15/2023       Lab Results   Component Value Date    PSA 0.673 03/12/2018       Lab Results   Component Value Date    TESTOSTERONE 321.50 03/12/2018    TESTOSTERONE 582.20 12/16/2017    TESTFRE 15.8 03/12/2018    TESTFRE 24.7 12/16/2017       Lab Results   Component Value Date    LDL 70 09/15/2023    HDL 33 (L) 09/15/2023    TRIG 94 09/15/2023    CHOLHDLRATIO 3.67 09/15/2023     Lab Results   Component Value Date    RIVECFME43 1,009 (H) 03/12/2018      No components found for: VHGG837P    Lab Results   Component Value Date    WBC 6.58 09/15/2023    HGB 17.5 09/15/2023    MCV 92.7 09/15/2023     09/15/2023       Lab Results   Component Value Date    PROTEIN Negative 09/15/2023    GLUCOSEU Negative 09/15/2023    BLOODU Negative 09/15/2023    NITRITEU Negative 09/15/2023    LEUKOCYTESUR Negative  09/15/2023          Lab Results   Component Value Date    HEPCVIRUSABY Non Reactive 03/20/2023       Imaging:           Medical Tests:           ASSESSMENT & PLAN     ANNUAL WELLNESS EXAM / PHYSICAL     Other medical problems addressed today:  Problem List Items Addressed This Visit          High    Class 2 severe obesity due to excess calories with serious comorbidity and body mass index (BMI) of 35.0 to 35.9 in adult (Chronic)    Current Assessment & Plan     Wt Readings from Last 3 Encounters:   09/22/23 115 kg (254 lb)   06/27/23 118 kg (259 lb 12.8 oz)   06/06/23 117 kg (258 lb)   Body mass index is 35.43 kg/m².     Discussed GLP-1 agonist therapy for weight loss and he is interested in proceeding.   Start Wegovy 0.25 mg weekly. If not available, consider Saxenda.     Advised to watch for nausea/vomiting, abdominal pain, constipation, and diarrhea.           Relevant Medications    Semaglutide-Weight Management (Wegovy) 0.25 MG/0.5ML solution auto-injector       Medium    Atrial fibrillation (Chronic)    Overview     Continue Xarelto and carvedilol          Relevant Medications    carvedilol (COREG) 12.5 MG tablet    sacubitril-valsartan (ENTRESTO) 49-51 MG tablet       Low    Hypogonadism in male (Chronic)    Overview     Continue with Dr. Salgado and testosterone injection.          Current Assessment & Plan     Lab Results   Component Value Date    HGB 17.5 09/15/2023    HGB 16.9 07/14/2023    HGB 16.0 07/12/2023      Advised to discuss with urologist about secondary polycythemia. May need phlebotomy at regular intervals.          GOKUL on CPAP (Chronic)    Overview     Continue CPAP.           Other Visit Diagnoses       Encounter for annual health examination    -  Primary            Summary/Discussion:         Next Appointment with me: Visit date not found    Return in about 4 months (around 1/22/2024) for Reassess chronic medical problems.      HEALTHCARE MAINTENANCE ISSUES       Cancer Screening:  Colon:  Initial/Next screening at age: 46  Repeat colon cancer screening: N/A at this time  Prostate: No screening needed at this time  Testicular: Recommended monthly self exam  Skin: Monthly self skin examination, annual exam by health professional  Lung: Does not meet criteria for lung cancer screening.   Other:    Screening Labs & Tests:  Lab results reviewed & discussed with with patient or orders placed today.  EKG:  CV Screening: Lipid panel  DEXA (75+ or risk factors):   HEP C (If born [birth year]17-[birth year] or risk factors): Previously had negative screen  Other:     Immunization/Vaccinations (to be given today unless deferred by patient)  Influenza: Recommended annual influenza vaccine  Hepatitis A: Recommended here or at pharmacy  Hepatitis B: Verify immunization records  Tetanus/Pertussis: Up to date  Pneumococcal: Up to date  Shingles: Not needed at this time  COVID: Advised to consider vaccine   RSV:   Lifestyle Counseling:  Lifestyle Modifications: Attempt to lose weight, Maintain a low sugar/carbohydrate diet, Follow a low fat, low cholesterol diet, Maintain low sodium diet (< 3 gm) for blood pressure, and Discussed sexual issues, safe sex practices, contraception  Safety Issues: Always wear seatbelt, Avoid texting while driving   Use sunscreen, regular skin examination  Recommended annual dental/vision examination.  Emotional/Stress/Sleep: Reviewed and  given when appropriate      Health Maintenance   Topic Date Due    COVID-19 Vaccine (4 - Moderna series) 01/09/2022    ANNUAL PHYSICAL  09/22/2023    INFLUENZA VACCINE  10/01/2023    BMI FOLLOWUP  06/27/2024    LIPID PANEL  09/15/2024    TDAP/TD VACCINES (3 - Td or Tdap) 09/22/2032    HEPATITIS C SCREENING  Completed    Pneumococcal Vaccine 0-64  Aged Out

## 2023-09-22 NOTE — PATIENT INSTRUCTIONS
** IMPORTANT MESSAGE FROM DR. BRAVO **    In our office, your satisfaction is VERY important to us.     You may receive a survey from [facility] by mail or E-mail for you to provide feedback about your visit. This information is invaluable for me to know what we can do to improve our services.     I ask that you please take a few minutes to complete the survey and let us know how we are doing in serving your needs. (You may receive the survey more than once for multiple visits)    Thank You !    Dr. Bravo    _________________________________________________________________________________________________________________________      ** ADDITIONAL INSTRUCTION / REMINDERS FROM DR. BRAVO **    Cinthia / Marc

## 2023-09-22 NOTE — ASSESSMENT & PLAN NOTE
Wt Readings from Last 3 Encounters:   09/22/23 115 kg (254 lb)   06/27/23 118 kg (259 lb 12.8 oz)   06/06/23 117 kg (258 lb)     Body mass index is 35.43 kg/m².     Discussed GLP-1 agonist therapy for weight loss and he is interested in proceeding.   Start Wegovy 0.25 mg weekly. If not available, consider Saxenda.     Advised to watch for nausea/vomiting, abdominal pain, constipation, and diarrhea.

## 2023-09-26 ENCOUNTER — TREATMENT (OUTPATIENT)
Dept: PHYSICAL THERAPY | Facility: CLINIC | Age: 33
End: 2023-09-26
Payer: OTHER MISCELLANEOUS

## 2023-09-26 DIAGNOSIS — M25.561 ACUTE PAIN OF RIGHT KNEE: Primary | ICD-10-CM

## 2023-09-26 DIAGNOSIS — Z98.890 HISTORY OF REPAIR OF ACL: ICD-10-CM

## 2023-09-26 DIAGNOSIS — R26.9 GAIT DISTURBANCE: ICD-10-CM

## 2023-09-26 DIAGNOSIS — R29.898 WEAKNESS OF RIGHT LOWER EXTREMITY: ICD-10-CM

## 2023-09-26 DIAGNOSIS — Z98.890 S/P RIGHT KNEE ARTHROSCOPY: ICD-10-CM

## 2023-09-26 PROCEDURE — 97110 THERAPEUTIC EXERCISES: CPT | Performed by: PHYSICAL THERAPIST

## 2023-09-26 PROCEDURE — 97112 NEUROMUSCULAR REEDUCATION: CPT | Performed by: PHYSICAL THERAPIST

## 2023-09-26 PROCEDURE — 97530 THERAPEUTIC ACTIVITIES: CPT | Performed by: PHYSICAL THERAPIST

## 2023-09-26 NOTE — PROGRESS NOTES
Physical Therapy Daily Treatment Note    Patient: Shaheen Rivera   : 1990  Diagnosis/ICD-10 Code:  Acute pain of right knee [M25.561]  Referring practitioner: Marta Lal MD  Date of Initial Visit: Type: THERAPY  Noted: 2023  Today's Date: 2023  Patient seen for 19 sessions    Visit Diagnoses:    ICD-10-CM ICD-9-CM   1. Acute pain of right knee  M25.561 719.46   2. Weakness of right lower extremity  R29.898 729.89   3. Gait disturbance  R26.9 781.2   4. History of repair of ACL  Z98.890 V45.89   5. S/P right knee arthroscopy  Z98.890 V45.89       [facility] [place] [address]  [phone] (phone)  [phone] (fax)         Subjective knee has been doing ok    Objective     Exercises/Therapeutic Activity/NM re-ed:  -walk on TM prior to session 40 min  -8 inch step ups fwd and lateral x20    -single leg deal lift (10lb, light touch when performing on R LE for balance) 2x10  -side stepping, blue band at ankle, 2x10  -monster walk, blue band at ankle 2x10  -4 in step downs x20   -goblet squats (10lb wt) 2x10 (cues for Wbing through whole foot to engage the quad more)  DEFER  -squats on rockerboard, 2x20  -SLS with ball throws, 3x30 sec on each LE (mild to mod compensation for SLS at times)  -glute med hip hike on 1-2 inch book, 3x10 for each LE  -HS and calf stretching, 3x20 sec      -fast toe taps on 4 inch step, 2x15       Assessment/Plan  Pain has been a little better since the steroid dose pack. His balance in SLS activities has improved, less compensation and having to toe touch with the other foot to maintain balance.          Timed:    Manual Therapy:         mins  92634;  Therapeutic Exercise:    8     mins  82530;     Neuromuscular Soumya:    13    mins  51410;    Therapeutic Activity:     23     mins  22784;     Gait Training:           mins  73012;     Ultrasound:          mins  51614;    Electrical Stimulation:         mins   60150 ( );  Iontophoresis         mins 98081;  Aquatic Therapy         mins 75108;      Untimed:  Electrical Stimulation:         mins  22688 ( );  Traction:         mins  26392;   Dry Needling   (1-2 muscles)             mins 20560 (Self-pay)  Dry Needling (3-4 muscles)           mins 20561 (Self-pay)  Dry Needling Trial              mins DRYNDLTRIAL  (No Charge)    Timed Treatment:   44   mins   Total Treatment:     44   mins    Khushi Garcia PT, CDNT  Physical Therapist    [place] License:[id number]

## 2023-10-05 ENCOUNTER — TREATMENT (OUTPATIENT)
Dept: PHYSICAL THERAPY | Facility: CLINIC | Age: 33
End: 2023-10-05
Payer: OTHER MISCELLANEOUS

## 2023-10-05 DIAGNOSIS — Z98.890 HISTORY OF REPAIR OF ACL: ICD-10-CM

## 2023-10-05 DIAGNOSIS — Z98.890 S/P RIGHT KNEE ARTHROSCOPY: ICD-10-CM

## 2023-10-05 DIAGNOSIS — R26.9 GAIT DISTURBANCE: ICD-10-CM

## 2023-10-05 DIAGNOSIS — M25.561 ACUTE PAIN OF RIGHT KNEE: Primary | ICD-10-CM

## 2023-10-05 DIAGNOSIS — R29.898 WEAKNESS OF RIGHT LOWER EXTREMITY: ICD-10-CM

## 2023-10-05 PROCEDURE — 97110 THERAPEUTIC EXERCISES: CPT | Performed by: PHYSICAL THERAPIST

## 2023-10-05 PROCEDURE — 97530 THERAPEUTIC ACTIVITIES: CPT | Performed by: PHYSICAL THERAPIST

## 2023-10-05 PROCEDURE — 97112 NEUROMUSCULAR REEDUCATION: CPT | Performed by: PHYSICAL THERAPIST

## 2023-10-05 NOTE — PROGRESS NOTES
Re-Assessment / Re-Certification        Patient: Shaheen Rivera   : 1990  Diagnosis/ICD-10 Code:  Acute pain of right knee [M25.561]  Referring practitioner: Marta Lal MD  Date of Initial Visit: Type: THERAPY  Noted: 2023  Today's Date: 10/5/2023  Patient seen for 20 sessions    Visit Diagnoses:    ICD-10-CM ICD-9-CM   1. Acute pain of right knee  M25.561 719.46   2. Weakness of right lower extremity  R29.898 729.89   3. Gait disturbance  R26.9 781.2   4. History of repair of ACL  Z98.890 V45.89   5. S/P right knee arthroscopy  Z98.890 V45.89       [facility] [place] [address]  [phone] (phone)  [phone] (fax)    Subjective:     Clinical Progress: improved  Home Program Compliance: Yes  Treatment has included:  therapeutic exercise, therapeutic activity, neuro-muscular retraining , gait training, and patient education with home exercise program     Subjective knee seems better, no buckling  Objective          Strength/Myotome Testing     Right Hip   Planes of Motion   Flexion: 5  Abduction: 4+  Adduction: 5  External rotation: 5    Right Knee   Flexion: 5  Extension: 4+      Exercises/Therapeutic Activity/NM re-ed:  -walk on TM prior to session 40 min  -8 inch step ups fwd and lateral x20    -single leg deal lift (10lb, light touch when performing on R LE for balance) 2x10  -side stepping, blue band at ankle, 2x10  -monster walk, blue band at ankle 2x10  -4 in step downs x20   -goblet squats (10lb wt) 2x10 (cues for Wbing through whole foot to engage the quad more)  DEFER  -squats on rockerboard, 2x20  -SLS with ball throws, 3x30 sec on each LE (mild to mod compensation for SLS at times)  -glute med hip hike on 1-2 inch book, 3x10 for each LE  -HS and calf stretching, 3x20 sec      -fast toe taps on 4 inch step, 2x15  DEFER    Functional outcome score:   Knee Outcome Survey=75%      Assessment & Plan        Assessment  Assessment details: Shaheen Rivera has been seen for 20 physical therapy sessions for s/p R knee scope.  Treatment has included therapeutic exercise, therapeutic activity, neuro-muscular retraining , gait training, and patient education with home exercise program . Progress to physical therapy goals is good. Pt continues to improve his strength and balance. He is able to ascend stairs reciprocally without issue, still some hesitancy with descending. Knee and hip strength 4+ to 5/5.  He will benefit from continued skilled physical therapy to address remaining impairments and functional limitations.       Prognosis: good    Goals  Plan Goals: ST wks  1. Patient will be independent with education for symptom management, joint protection and strategies to minimize stress on affected tissues (MET)   2. Patient will be able to ascend stairs reciprocally without pain to get to his apartment with greater ease (MET)   3. Pt to improve R knee ROM to 0-115 deg for improved gait pattern (MET)   4. Pt to report no more than 4/10 pain with walking on even ground (MET)      LT wks  1. Pt to improve R knee ROM to 3 deg of HE to 125 deg for improved gait pattern (PART MET) for ext, inbiqxg=261 deg  2. Pt to improve score on Knee Outcome Survey from 64% to 90% for overall functional improvement (ONGOING) improved 75%  3. Patient will increase R knee and hip strength to 5/5 to improve functional mobility (ONGOING) (PART MET)   4. Patient will descend stairs reciprocally with rail support as needed without increased pain (PART MET)   5. Pt able to jog on the TM for 10 min without R knee pain and no compensation (ONGOING)  6. Patient will demonstrate an independent HEP for core and knee strength and flexibility/ROM. (ONGOING)   7. Pt able to perform SL squat x10 with no compensation to exhibit improved strength, balance, and stability      Plan  Therapy options: will be seen for skilled therapy  services  Frequency: 2x week  Duration in weeks: 8  Treatment plan discussed with: patient         Recommendations: Continue as planned  Timeframe: 2 months  Prognosis to achieve goals: good    PT Signature: Khushi Garcia PT, CDNT  License: [id number]      Based upon review of the patient's progress and continued therapy plan, it is my medical opinion that Shaheen Rivera should continue physical therapy treatment at [facility]  [address]   [place] [address]  [phone].    Signature: __________________________________  Marta Lal MD  [id number]    Timed:  Manual Therapy:         mins  94297;  Therapeutic Exercise:    10     mins  67649;     Neuromuscular Soumya:    10    mins  34253;    Therapeutic Activity:     23     mins  33793;     Gait Training:           mins  44516;     Ultrasound:          mins  97667;    Electrical Stimulation:         mins  97132 ( );  Iontophoresis         mins 48752;      Untimed:  Electrical Stimulation:         mins  62215 ( );  Mechanical Traction:         mins  32309;   Dry Needling   (1-2 muscles)            mins 31753 (Self-pay)  Dry Needling (3-4 muscles)          mins 20561 (Self-pay)  Dry Needling Trial              mins DRYNDLTRIAL  (No Charge)    Timed Treatment:   43   mins   Total Treatment:     43   mins

## 2023-10-12 ENCOUNTER — TREATMENT (OUTPATIENT)
Dept: PHYSICAL THERAPY | Facility: CLINIC | Age: 33
End: 2023-10-12
Payer: OTHER MISCELLANEOUS

## 2023-10-12 DIAGNOSIS — R26.9 GAIT DISTURBANCE: ICD-10-CM

## 2023-10-12 DIAGNOSIS — M25.561 ACUTE PAIN OF RIGHT KNEE: Primary | ICD-10-CM

## 2023-10-12 DIAGNOSIS — R29.898 WEAKNESS OF RIGHT LOWER EXTREMITY: ICD-10-CM

## 2023-10-12 DIAGNOSIS — Z98.890 S/P RIGHT KNEE ARTHROSCOPY: ICD-10-CM

## 2023-10-12 DIAGNOSIS — Z98.890 HISTORY OF REPAIR OF ACL: ICD-10-CM

## 2023-10-12 NOTE — PROGRESS NOTES
30-Day / 10-Visit Progress Note         Patient: Shaheen Rivera   : 1990  Diagnosis/ICD-10 Code:  Acute pain of right knee [M25.561]  Referring practitioner: Marta Lal MD  Date of Initial Visit: Type: THERAPY  Noted: 2023  Today's Date: 10/12/2023  Patient seen for 21 sessions    Visit Diagnoses:    ICD-10-CM ICD-9-CM   1. Acute pain of right knee  M25.561 719.46   2. Weakness of right lower extremity  R29.898 729.89   3. Gait disturbance  R26.9 781.2   4. History of repair of ACL  Z98.890 V45.89   5. S/P right knee arthroscopy  Z98.890 V45.89       [facility] [place] [address]  [phone] (phone)  [phone] (fax)    Subjective:     Clinical Progress: improved  Home Program Compliance: Yes  Treatment has included:  therapeutic exercise, manual therapy, therapeutic activity, neuro-muscular retraining , gait training, and patient education with home exercise program     Subjective pt was at a wedding over the weekend and was wearing boots, he was sore for a couple of days after. Went to a  on Monday (long time standing) and drove 12 hours for work yesterday.  Has tightness in the quad today    Objective          Strength/Myotome Testing     Right Hip   Planes of Motion   Flexion: 5  Extension: 5  Abduction: 4+  Adduction: 5  External rotation: 5    Right Knee   Flexion: 5  Right knee extension strength: 5-          Exercises/Therapeutic Activity/NM re-ed/Manual:  -walk on TM prior to session 40 min  -8 inch step ups fwd and lateral x20    -single leg deal lift (10lb, light touch when performing on R LE for balance) 2x10  -side stepping, blue band at ankle, 2x10  -monster walk, blue band at ankle 2x10  -4 in step downs x20   -squats on rockerboard, 2x20  -SLS with ball throws, 3x30 sec on each LE (mild to mod compensation for SLS at times)  -glute med hip hike on 1-2 inch book, 3x10 for each LE  -standing knee flexion  stretch in stretch cage, 3x20 sec  -HS and calf stretching, 3x20 sec      -manual prone knee flexion stretch (with some distraction of joint) 3x20 sec  -manual hip flexor stretch in prone with flexed knee, on hand of R ilium to stabilize, 3x20 sec  -fast toe taps on 4 inch step, 2x15  DEFER     Functional Outcome Score:   Knee Outcome Survey=72.5%    Assessment & Plan       Assessment  Assessment details: Shaheen Rivera has been seen for 21 physical therapy sessions for R knee pain following surgery on 23.  Treatment has included therapeutic exercise, manual therapy, therapeutic activity, neuro-muscular retraining , gait training, and patient education with home exercise program . Progress to physical therapy goals is good. Overall pain levels have been staying low, aching more this week likely due to long drive and standing for prolonged period of time yesterday. He is progressing well with his balance, strength, stairs ascend and descend reciprocally, more hesitancy descending.  He will benefit from continued skilled physical therapy to address remaining impairments and functional limitations.       Prognosis: good    Goals  Plan Goals: ST wks  1. Patient will be independent with education for symptom management, joint protection and strategies to minimize stress on affected tissues (MET)   2. Patient will be able to ascend stairs reciprocally without pain to get to his apartment with greater ease (MET)   3. Pt to improve R knee ROM to 0-115 deg for improved gait pattern (MET)   4. Pt to report no more than 4/10 pain with walking on even ground (MET)      LT wks  1. Pt to improve R knee ROM to 3 deg of HE to 125 deg for improved gait pattern (PART MET) for ext, dozmfrb=687 deg  2. Pt to improve score on Knee Outcome Survey from 64% to 90% for overall functional improvement (ONGOING) improved 72.5%  3. Patient will increase R knee and hip strength to 5/5 to improve functional mobility (ONGOING) (PART  MET)   4. Patient will descend stairs reciprocally with rail support as needed without increased pain (PART MET) quad strength 5-/5  5. Pt able to jog on the TM for 10 min without R knee pain and no compensation (ONGOING)  6. Patient will demonstrate an independent HEP for core and knee strength and flexibility/ROM. (ONGOING)   7. Pt able to perform SL squat x10 with no compensation to exhibit improved strength, balance, and stability (ONGOING)       Plan  Therapy options: will be seen for skilled therapy services  Frequency: 1x week  Duration in weeks: 8  Treatment plan discussed with: patient           Recommendations: Continue as planned  Timeframe: 2 months  Prognosis to achieve goals: good    PT Signature: Khushi Garcia PT, CDNT    License Number: [id number]    Electronically signed by Khushi Garcia PT, 10/12/23, 3:06 PM EDT      Based upon review of the patient's progress and continued therapy plan, it is my medical opinion that Shaheen Rivera should continue physical therapy treatment at [facility]  [address] [place] [address] [address] [place] [address]  [phone].    Signature: __________________________________  Marta Lal MD    Timed:  Manual Therapy:    8     mins  64486;  Therapeutic Exercise:    8     mins  88111;     Neuromuscular Soumya:    15    mins  72754;    Therapeutic Activity:     14     mins  32242;     Gait Training:           mins  55896;     Ultrasound:          mins  45340;    Iontophoresis         mins 36607;    Untimed:  Electrical Stimulation:         mins  87928 ( );  Traction:         mins  73634;   Dry Needling   (1-2 muscles)            mins 38909 (Self-pay)  Dry Needling (3-4 muscles)             mins 20561 (Self-pay)  Dry Needling Trial                 mins DRYNDLTRIAL  (No Charge)    Timed Treatment:   45   mins   Total Treatment:     45   mins

## 2023-10-17 ENCOUNTER — TELEPHONE (OUTPATIENT)
Dept: ORTHOPEDIC SURGERY | Facility: CLINIC | Age: 33
End: 2023-10-17
Payer: COMMERCIAL

## 2023-10-17 DIAGNOSIS — Z98.890 S/P ACL RECONSTRUCTION: Primary | ICD-10-CM

## 2023-10-17 NOTE — TELEPHONE ENCOUNTER
Tyra with [facility] sent a message.  This is a patient of ours and 's. We are needed to request more auth from workers comp. Can you put in a request for Dr. Lal to put a new order in to continue therapy?     Please advise.

## 2023-11-01 ENCOUNTER — TREATMENT (OUTPATIENT)
Dept: PHYSICAL THERAPY | Facility: CLINIC | Age: 33
End: 2023-11-01
Payer: OTHER MISCELLANEOUS

## 2023-11-01 DIAGNOSIS — Z98.890 S/P RIGHT KNEE ARTHROSCOPY: ICD-10-CM

## 2023-11-01 DIAGNOSIS — R29.898 WEAKNESS OF RIGHT LOWER EXTREMITY: ICD-10-CM

## 2023-11-01 DIAGNOSIS — R26.9 GAIT DISTURBANCE: ICD-10-CM

## 2023-11-01 DIAGNOSIS — M25.561 ACUTE PAIN OF RIGHT KNEE: Primary | ICD-10-CM

## 2023-11-01 DIAGNOSIS — Z98.890 HISTORY OF REPAIR OF ACL: ICD-10-CM

## 2023-11-01 NOTE — PROGRESS NOTES
Physical Therapy Daily Treatment Note    Patient: Shaheen Rivera   : 1990  Diagnosis/ICD-10 Code:  Acute pain of right knee [M25.561]  Referring practitioner: Marta Lal MD  Date of Initial Visit: Type: THERAPY  Noted: 2023  Today's Date: 2023  Patient seen for 22 sessions    Visit Diagnoses:    ICD-10-CM ICD-9-CM   1. Acute pain of right knee  M25.561 719.46   2. Weakness of right lower extremity  R29.898 729.89   3. Gait disturbance  R26.9 781.2   4. History of repair of ACL  Z98.890 V45.89   5. S/P right knee arthroscopy  Z98.890 V45.89       [facility] [place] [address]  [phone] (phone)  [phone] (fax)         Subjective L hip has been pretty sore again, may need to have some needling done    Objective      Exercises/Therapeutic Activity/NM re-ed/Manual:  -walk on TM prior to session 20 min  -8 inch step ups fwd and lateral x20    -4 in step downs x20   -squats on rockerboard, 2x20  -SLS with ball throws, 3x30 sec on each LE (mild to mod compensation for SLS at times)  -3 way hamstring stretch with strap 3x30 sec of each on B LE  -HS and calf stretching, 3x20 sec      -manual prone knee flexion stretch (with some distraction of joint) 3x20 sec B  -manual hip flexor stretch in prone with flexed knee, on hand of R ilium to stabilize, 3x20 sec B  -supine hip flexor stretch with OP from therapist 3x30 sec     Discussed gait on TM. Making sure he is not trunk shifting over the stance leg to aggravate the hip    Assessment/Plan  Pt has been out of town for work quite a bit (also out next week). He has been continuing with walking on the TM and exercises. He has been having some increased L hip flexor and TFL tightness. Discussed his gait when fatigued he sometimes falls back into an antalgic pattern, likely contributing to hip pain. Shorten the duration of the walks and really focus on the form.          Timed:    Manual  Therapy:   12      mins  72349;  Therapeutic Exercise:    8     mins  14175;     Neuromuscular Soumya:    10    mins  23354;    Therapeutic Activity:     13     mins  70158;     Gait Training:           mins  60961;     Ultrasound:          mins  66141;    Electrical Stimulation:         mins  19874 ( );  Iontophoresis         mins 32632;  Aquatic Therapy         mins 66485;      Untimed:  Electrical Stimulation:         mins  51850 ( );  Traction:         mins  13625;   Dry Needling   (1-2 muscles)             mins 20560 (Self-pay)  Dry Needling (3-4 muscles)           mins 20561 (Self-pay)  Dry Needling Trial              mins DRYNDLTRIAL  (No Charge)    Timed Treatment:   43   mins   Total Treatment:     43   mins    Khushi Garcia PT, CDNT  Physical Therapist    [place] License:[id number]

## 2023-11-06 DIAGNOSIS — J30.9 ALLERGIC RHINITIS, UNSPECIFIED SEASONALITY, UNSPECIFIED TRIGGER: ICD-10-CM

## 2023-11-06 RX ORDER — FLUTICASONE PROPIONATE 50 MCG
SPRAY, SUSPENSION (ML) NASAL
Qty: 16 G | Refills: 1 | Status: SHIPPED | OUTPATIENT
Start: 2023-11-06

## 2023-11-13 ENCOUNTER — TELEPHONE (OUTPATIENT)
Dept: ORTHOPEDIC SURGERY | Facility: CLINIC | Age: 33
End: 2023-11-13
Payer: COMMERCIAL

## 2023-11-13 DIAGNOSIS — Z98.890 S/P ACL RECONSTRUCTION: Primary | ICD-10-CM

## 2023-11-13 NOTE — TELEPHONE ENCOUNTER
Dr. Lal could you put in a new order for this patient. The one you put one in about a month ago WC adjustor only approved 1 visit because I guess that's all the order said. So would you mind to put in a new order with more visits and a longer duration?  Patient is going to [facility]

## 2024-01-23 ENCOUNTER — OFFICE VISIT (OUTPATIENT)
Dept: INTERNAL MEDICINE | Age: 34
End: 2024-01-23
Payer: COMMERCIAL

## 2024-01-23 VITALS
HEART RATE: 79 BPM | BODY MASS INDEX: 35.42 KG/M2 | DIASTOLIC BLOOD PRESSURE: 84 MMHG | SYSTOLIC BLOOD PRESSURE: 110 MMHG | OXYGEN SATURATION: 98 % | WEIGHT: 253 LBS | HEIGHT: 71 IN | TEMPERATURE: 97.1 F

## 2024-01-23 DIAGNOSIS — E29.1 HYPOGONADISM IN MALE: Chronic | ICD-10-CM

## 2024-01-23 DIAGNOSIS — E78.5 HYPERLIPIDEMIA, UNSPECIFIED HYPERLIPIDEMIA TYPE: Chronic | ICD-10-CM

## 2024-01-23 DIAGNOSIS — I48.0 PAROXYSMAL ATRIAL FIBRILLATION: Chronic | ICD-10-CM

## 2024-01-23 DIAGNOSIS — E66.01 CLASS 2 SEVERE OBESITY DUE TO EXCESS CALORIES WITH SERIOUS COMORBIDITY AND BODY MASS INDEX (BMI) OF 35.0 TO 35.9 IN ADULT: Primary | Chronic | ICD-10-CM

## 2024-01-23 RX ORDER — ACYCLOVIR 200 MG/1
200 CAPSULE ORAL 3 TIMES DAILY
COMMUNITY
Start: 2023-10-02

## 2024-01-23 NOTE — PROGRESS NOTES
"    I N T E R N A L  M E D I C I N E     J U N O H  K I M,  M D      ENCOUNTER DATE:  01/23/2024    Shaheen Rivera / 33 y.o. / male    CHIEF COMPLAINT / REASON FOR OFFICE VISIT     Obesity and Atrial Fibrillation      ASSESSMENT & PLAN     Problem List Items Addressed This Visit          High    Class 2 severe obesity due to excess calories with serious comorbidity and body mass index (BMI) of 35.0 to 35.9 in adult - Primary (Chronic)    Current Assessment & Plan     Did not start Wegovy due to supply issue.   Will hold off on starting GLP-1 agonist right now due to his ongoing sinus problem which is his primary concern.     Wt Readings from Last 3 Encounters:   01/23/24 115 kg (253 lb)   09/22/23 115 kg (254 lb)   06/27/23 118 kg (259 lb 12.8 oz)   Body mass index is 35.29 kg/m².               Medium    Atrial fibrillation (Chronic)    Overview     Continue Xarelto and carvedilol          Relevant Medications    carvedilol (COREG) 12.5 MG tablet    sacubitril-valsartan (ENTRESTO) 49-51 MG tablet    Hyperlipidemia (Chronic)    Overview     Continue rosuvastatin 10 mg (managed by cardiologist)         Relevant Medications    rosuvastatin (CRESTOR) 10 MG tablet       Low    Hypogonadism in male (Chronic)    Overview     Continue with Dr. Salgado and testosterone injection.           Orders Placed This Encounter   Procedures    Fluzone (or Fluarix & Flulaval for VFC) >6mos     No orders of the defined types were placed in this encounter.      SUMMARY/DISCUSSION  Follow-up closely with ENT regarding deviated septum on recurrent sinus infections.       Next Appointment with me: Visit date not found    Return in about 6 months (around 7/23/2024) for Reassess chronic medical problems.      VITAL SIGNS     Vitals:    01/23/24 0845   BP: 110/84   Pulse: 79   Temp: 97.1 °F (36.2 °C)   SpO2: 98%   Weight: 115 kg (253 lb)   Height: 180.3 cm (71\")       BP Readings from Last 3 Encounters:   01/23/24 110/84   09/22/23 110/80 " "  06/13/23 119/47     Wt Readings from Last 3 Encounters:   01/23/24 115 kg (253 lb)   09/22/23 115 kg (254 lb)   06/27/23 118 kg (259 lb 12.8 oz)     Body mass index is 35.29 kg/m².    Blood pressure readings recorded on patient flowsheet:       No data to display                  MEDICATIONS AT THE TIME OF OFFICE VISIT     Current Outpatient Medications on File Prior to Visit   Medication Sig    acyclovir (ZOVIRAX) 200 MG capsule Take 1 capsule by mouth 3 (Three) Times a Day.    albuterol sulfate  (90 Base) MCG/ACT inhaler Inhale 2 puffs orally every 4 hours as needed for wheezing or shortness of air. (Patient taking differently: Inhale 2 puffs Every 4 (Four) Hours As Needed.)    anastrozole (ARIMIDEX) 1 MG tablet Take 1 tablet by mouth Daily.    B-D 3CC LUER-IGNACIA SYR 66RF3-4/2 22G X 1-1/2\" 3 ML misc     BD Hypodermic Needle 18G X 1\" misc     carvedilol (COREG) 12.5 MG tablet Take 1.5 tablets by mouth 2 (Two) Times a Day.    cetirizine (zyrTEC) 10 MG tablet Take 1 tablet by mouth Every Night. (Patient taking differently: Take 1 tablet by mouth Every Morning.)    Cyanocobalamin (B-12-SL) 1000 MCG sublingual tablet Place 1 tablet under the tongue Daily.    fluticasone (FLONASE) 50 MCG/ACT nasal spray Administer 1spray in each nostril twice daily.    furosemide (LASIX) 40 MG tablet Take 1 tablet by mouth 2 (Two) Times a Day.    rosuvastatin (CRESTOR) 10 MG tablet Take 1 tablet by mouth Every Night.    sacubitril-valsartan (ENTRESTO) 49-51 MG tablet Take 1 tablet by mouth 2 (Two) Times a Day.    spironolactone (ALDACTONE) 25 MG tablet Take 1 tablet by mouth Daily.    Testosterone Cypionate (DEPOTESTOTERONE CYPIONATE) 200 MG/ML injection Inject 0.5 mL into the appropriate muscle as directed by prescriber 1 (One) Time Per Week.    Xarelto 20 MG tablet 1 tablet Daily With Dinner. INSTRUCTED TO HOLD PER MD 2 TO 3 DAYS BEFORE SURGERY    [DISCONTINUED] Semaglutide-Weight Management (Wegovy) 0.25 MG/0.5ML solution " auto-injector Inject 0.25 mg under the skin into the appropriate area as directed 1 (One) Time Per Week.     No current facility-administered medications on file prior to visit.          HISTORY OF PRESENT ILLNESS     He was unable to start Wegovy or Zepbound due to supply issues at his current [facility]. His weight today is 253 pounds. He weighed 248 pounds at home this morning without clothes. He continues to monitor his diet and exercise regularly. His A1c in 09/2023 improved to 5.4 percent.     He confirms taking rosuvastatin 10 mg for hyperlipidemia which is managed by cardiology. His LDL improved to 70 mg/dL in 09/2023. Additionally, he is on Xarelto 20 mg daily and carvedilol 12.5 mg 1.5 tablets daily for atrial fibrillation. This is stable per the patient. He confirms taking Entresto, Lasix 40 mg daily, and spironolactone 25 mg daily.     He follows with Dr. Salgado and receives regular testosterone injections for hypogonadism.    He confirms that he is still taking Arimidex.     Patient reports recurring sinus infections with multiple rounds of antibiotics. He recently seen an ENT around [facility] and was advised he will need sinus surgery and septoplasty. He was referred to [facility] for inpatient surgery due to him being on Xarelto for atrial fibrillation but was informed after his CT scan that he would only require a septoplasty. He recently completed a 10-day course of antibiotics prior to his CT scan. Patient has been on a CPAP machine for 3 to 4 years now. He is compliant with this but mentions some nights during [place] he had to take it off at night because he was ill.     Patient is a  for the [employer] at [employer].       Patient Care Team:  Lico Golden MD as PCP - General (Internal Medicine)  Kim Mcmillan MD as Consulting Physician (Cardiology)  Chacorta Salgado Jr., MD as Consulting Physician (Urology)  Sid Sue MD as Consulting  "Physician (Cardiology)  Marta Lal MD as Surgeon (Orthopedic Surgery)    REVIEW OF SYSTEMS     Review of Systems       PHYSICAL EXAMINATION     Physical Exam  Alert with normal thought and judgment.   Normal affect and mood.  Cardiovascular: Normal rate  Pulm/Chest: Effort normal, breath sounds normal.       REVIEWED DATA     Labs:     Lab Results   Component Value Date     09/15/2023    K 4.4 09/15/2023    CALCIUM 9.8 09/15/2023    AST 30 09/15/2023    ALT 43 (H) 09/15/2023    BUN 13 09/15/2023    CREATININE 1.23 09/15/2023    CREATININE 1.00 06/06/2023    CREATININE 1.24 09/22/2022    EGFRIFNONA 103 01/04/2022    EGFRIFAFRI >60 04/13/2022       Lab Results   Component Value Date    HGBA1C 5.40 09/15/2023    HGBA1C 5.5 09/22/2022    HGBA1C 4.95 03/12/2018       Lab Results   Component Value Date    LDL 70 09/15/2023    LDL 78 03/20/2023     (H) 09/22/2022    HDL 33 (L) 09/15/2023    HDL 36 (L) 03/20/2023    TRIG 94 09/15/2023    TRIG 110 03/20/2023       Lab Results   Component Value Date    TSH 1.700 09/15/2023    TSH 1.580 09/22/2022    TSH 2.720 12/27/2020    FREET4 1.18 09/15/2023    FREET4 1.18 09/22/2022    FREET4 0.94 05/11/2021       Lab Results   Component Value Date    WBC 6.68 12/24/2023    HGB 16.9 12/24/2023     12/24/2023       No results found for: \"MALBCRERATIO\"         Imaging:     CT sinuses wo IV contrast (01/22/2024 08:25)   Minimal mucosal thickening at the left maxillary and sphenoid sinus outflow tracts. Otherwise the paranasal sinuses are clear with no dense opacification or fluid levels.     Medical Tests:           Summary of old records / correspondence / consultant report:           Request outside records:       Transcribed from ambient dictation for Lico Golden MD by Kendy Contreras.  01/23/24   11:56 EST    Patient or patient representative verbalized consent to the visit recording.  I have personally performed the services described in this document as " transcribed by the above individual, and it is both accurate and complete.  Lico Golden MD  1/23/2024  12:30 EST

## 2024-01-23 NOTE — LETTER
[facility]  Vaccine Consent Form    Patient Name:  Shaheen Rivera  Patient :  1990     Vaccine(s) Ordered    Fluzone (or Fluarix & Flulaval for VFC) >6mos        Screening Checklist  The following questions should be completed prior to vaccination. If you answer “yes” to any question, it does not necessarily mean you should not be vaccinated. It just means we may need to clarify or ask more questions. If a question is unclear, please ask your healthcare provider to explain it.    Yes No   Any fever or moderate to severe illness today (mild illness and/or antibiotic treatment are not contraindications)?     Do you have a history of a serious reaction to any previous vaccinations, such as anaphylaxis, encephalopathy within 7 days, Guillain-[place] syndrome within 6 weeks, seizure?     Have you received any live vaccine(s) (e.g MMR, MACY) or any other vaccines in the last month (to ensure duplicate doses aren't given)?     Do you have an anaphylactic allergy to latex (DTaP, DTaP-IPV, Hep A, Hep B, MenB, RV, Td, Tdap), baker’s yeast (Hep B, HPV), polysorbates (RSV, nirsevimab, PCV 20, Rotavirrus, Tdap, Shingrix), or gelatin (MACY, MMR)?     Do you have an anaphylactic allergy to neomycin (Rabies, MACY, MMR, IPV, Hep A), polymyxin B (IPV), or streptomycin (IPV)?      Any cancer, leukemia, AIDS, or other immune system disorder? (MACY, MMR, RV)     Do you have a parent, brother, or sister with an immune system problem (if immune competence of vaccine recipient clinically verified, can proceed)? (MMR, MACY)     Any recent steroid treatments for >2 weeks, chemotherapy, or radiation treatment? (MACY, MMR)     Have you received antibody-containing blood transfusions or IVIG in the past 11 months (recommended interval is dependent on product)? (MMR, MACY)     Have you taken antiviral drugs (acyclovir, famciclovir, valacyclovir for MACY) in the last 24 or 48 hours, respectively?      Are you pregnant or planning to become  "pregnant within 1 month? (MACY, MMR, HPV, IPV, MenB, Abrexvy; For Hep B- refer to Engerix-B; For RSV - Abrysvo is indicated for 32-36 weeks of pregnancy from September to January)     For infants, have you ever been told your child has had intussusception or a medical emergency involving obstruction of the intestine (Rotavirus)? If not for an infant, can skip this question.         *Ordering Physicians/APC should be consulted if \"yes\" is checked by the patient or guardian above.  I have received, read, and understand the Vaccine Information Statement (VIS) for each vaccine ordered.  I have considered my or my child's health status as well as the health status of my close contacts.  I have taken the opportunity to discuss my vaccine questions with my or my child's health care provider.   I have requested that the ordered vaccine(s) be given to me or my child.  I understand the benefits and risks of the vaccines.  I understand that I should remain in the clinic for 15 minutes after receiving the vaccine(s).  _________________________________________________________  Signature of Patient or Parent/Legal Guardian ____________________  Date     "

## 2024-01-23 NOTE — ASSESSMENT & PLAN NOTE
Did not start Wegovy due to supply issue.   Will hold off on starting GLP-1 agonist right now due to his ongoing sinus problem which is his primary concern.     Wt Readings from Last 3 Encounters:   01/23/24 115 kg (253 lb)   09/22/23 115 kg (254 lb)   06/27/23 118 kg (259 lb 12.8 oz)     Body mass index is 35.29 kg/m².

## 2024-02-02 DIAGNOSIS — J30.9 ALLERGIC RHINITIS, UNSPECIFIED SEASONALITY, UNSPECIFIED TRIGGER: ICD-10-CM

## 2024-02-02 RX ORDER — FLUTICASONE PROPIONATE 50 MCG
SPRAY, SUSPENSION (ML) NASAL
Qty: 16 G | Refills: 1 | Status: SHIPPED | OUTPATIENT
Start: 2024-02-02

## 2024-07-21 DIAGNOSIS — J30.9 ALLERGIC RHINITIS, UNSPECIFIED SEASONALITY, UNSPECIFIED TRIGGER: ICD-10-CM

## 2024-07-23 RX ORDER — FLUTICASONE PROPIONATE 50 MCG
SPRAY, SUSPENSION (ML) NASAL
Qty: 16 G | Refills: 1 | Status: SHIPPED | OUTPATIENT
Start: 2024-07-23

## 2024-08-07 ENCOUNTER — PATIENT MESSAGE (OUTPATIENT)
Dept: INTERNAL MEDICINE | Age: 34
End: 2024-08-07
Payer: COMMERCIAL

## 2024-08-07 DIAGNOSIS — E66.01 CLASS 2 SEVERE OBESITY DUE TO EXCESS CALORIES WITH SERIOUS COMORBIDITY AND BODY MASS INDEX (BMI) OF 35.0 TO 35.9 IN ADULT: Primary | Chronic | ICD-10-CM

## 2024-08-20 ENCOUNTER — DOCUMENTATION (OUTPATIENT)
Dept: INTERNAL MEDICINE | Age: 34
End: 2024-08-20
Payer: COMMERCIAL

## 2024-08-20 RX ORDER — SEMAGLUTIDE 0.5 MG/.5ML
0.5 INJECTION, SOLUTION SUBCUTANEOUS WEEKLY
Qty: 2 ML | Refills: 0 | Status: SHIPPED | OUTPATIENT
Start: 2024-08-20

## 2024-08-20 NOTE — TELEPHONE ENCOUNTER
From: Shaheen Rivera  To: Lico Golden  Sent: 8/7/2024 12:04 PM EDT  Subject: Prescription     I started my first shot of the Wegovy today can you write a prescription for my refill or the next stage of the script so I can make sure I find it in time and don’t miss a dose

## 2024-08-20 NOTE — ASSESSMENT & PLAN NOTE
Just started Wegovy 0.25 mg 3 weeks ago but tolerating and interested in titrating dose.   Increase Wegovy to 0.5 mg weekly.   Keep follow-up 9/13/24.

## 2024-08-21 ENCOUNTER — PRIOR AUTHORIZATION (OUTPATIENT)
Dept: INTERNAL MEDICINE | Age: 34
End: 2024-08-21
Payer: COMMERCIAL

## 2024-08-21 NOTE — TELEPHONE ENCOUNTER
Wegovy 0.5MG/0.5ML auto-injectors  PA SUBMITTED TO PLAN [facility]    Your request has been approved  CaseId:[id number];Status:Approved;Review Type:Prior Auth;Coverage Start Date:07/22/2024;Coverage End Date:03/19/2025;

## 2024-09-13 ENCOUNTER — OFFICE VISIT (OUTPATIENT)
Dept: INTERNAL MEDICINE | Age: 34
End: 2024-09-13
Payer: COMMERCIAL

## 2024-09-13 VITALS
SYSTOLIC BLOOD PRESSURE: 126 MMHG | BODY MASS INDEX: 36.26 KG/M2 | OXYGEN SATURATION: 98 % | HEART RATE: 74 BPM | HEIGHT: 71 IN | WEIGHT: 259 LBS | DIASTOLIC BLOOD PRESSURE: 82 MMHG | TEMPERATURE: 97.1 F

## 2024-09-13 DIAGNOSIS — E66.01 CLASS 2 SEVERE OBESITY DUE TO EXCESS CALORIES WITH SERIOUS COMORBIDITY AND BODY MASS INDEX (BMI) OF 36.0 TO 36.9 IN ADULT: ICD-10-CM

## 2024-09-13 PROCEDURE — 99213 OFFICE O/P EST LOW 20 MIN: CPT | Performed by: INTERNAL MEDICINE

## 2024-09-13 RX ORDER — SEMAGLUTIDE 1 MG/.5ML
1 INJECTION, SOLUTION SUBCUTANEOUS WEEKLY
Qty: 2 ML | Refills: 0 | Status: SHIPPED | OUTPATIENT
Start: 2024-09-13

## 2024-09-13 NOTE — ASSESSMENT & PLAN NOTE
Wt Readings from Last 3 Encounters:   09/13/24 117 kg (259 lb)   01/23/24 115 kg (253 lb)   09/22/23 115 kg (254 lb)     Body mass index is 36.12 kg/m².     Increase Wegovy to 1 mg. Update 1 month. No significant side effects.   Maintain a low sugar/starch/carbohydrate diet.   Increase physical activity/exercise.

## 2024-09-13 NOTE — PROGRESS NOTES
"    I N T E R N A L  M E D I C I N E    J U N O H  K I M  M D      ENCOUNTER DATE:  09/13/2024    Shaheen Rivera / 34 y.o. / male    CHIEF COMPLAINT / REASON FOR OFFICE VISIT     Obesity, Atrial Fibrillation, Hyperlipidemia, and Hypogonadism in male      ASSESSMENT & PLAN     Problem List Items Addressed This Visit          High    Class 2 severe obesity due to excess calories with serious comorbidity and body mass index (BMI) of 36.0 to 36.9 in adult (Chronic)    Current Assessment & Plan     Wt Readings from Last 3 Encounters:   09/13/24 117 kg (259 lb)   01/23/24 115 kg (253 lb)   09/22/23 115 kg (254 lb)     Body mass index is 36.12 kg/m².     Increase Wegovy to 1 mg. Update 1 month. No significant side effects.   Maintain a low sugar/starch/carbohydrate diet.   Increase physical activity/exercise.          Relevant Medications    Semaglutide-Weight Management (Wegovy) 1 MG/0.5ML solution auto-injector     No orders of the defined types were placed in this encounter.    New Medications Ordered This Visit   Medications    Semaglutide-Weight Management (Wegovy) 1 MG/0.5ML solution auto-injector     Sig: Inject 0.5 mL under the skin into the appropriate area as directed 1 (One) Time Per Week.     Dispense:  2 mL     Refill:  0       SUMMARY/DISCUSSION        TOTAL TIME OF ENCOUNTER:        Next Appointment with me: 1/6/2025    Return in about 3 months (around 12/13/2024) for Reassess chronic medical problems.      VITAL SIGNS     Vitals:    09/13/24 0839   BP: 126/82   Pulse: 74   Temp: 97.1 °F (36.2 °C)   SpO2: 98%   Weight: 117 kg (259 lb)   Height: 180.3 cm (71\")       BP Readings from Last 3 Encounters:   09/13/24 126/82   01/23/24 110/84   09/22/23 110/80     Wt Readings from Last 3 Encounters:   09/13/24 117 kg (259 lb)   01/23/24 115 kg (253 lb)   09/22/23 115 kg (254 lb)     Body mass index is 36.12 kg/m².    Blood pressure readings recorded on patient flowsheet:       No data to display          " "        MEDICATIONS AT THE TIME OF OFFICE VISIT     Current Outpatient Medications on File Prior to Visit   Medication Sig    acyclovir (ZOVIRAX) 200 MG capsule Take 1 capsule by mouth 3 (Three) Times a Day.    albuterol sulfate  (90 Base) MCG/ACT inhaler Inhale 2 puffs orally every 4 hours as needed for wheezing or shortness of air. (Patient taking differently: Inhale 2 puffs Every 4 (Four) Hours As Needed.)    anastrozole (ARIMIDEX) 1 MG tablet Take 1 tablet by mouth Daily.    B-D 3CC LUER-IGNACIA SYR 32AU2-3/2 22G X 1-1/2\" 3 ML misc     BD Hypodermic Needle 18G X 1\" misc     carvedilol (COREG) 12.5 MG tablet Take 1.5 tablets by mouth 2 (Two) Times a Day.    cetirizine (zyrTEC) 10 MG tablet Take 1 tablet by mouth Every Night. (Patient taking differently: Take 1 tablet by mouth Every Morning.)    Cyanocobalamin (B-12-SL) 1000 MCG sublingual tablet Place 1 tablet under the tongue Daily.    fluticasone (FLONASE) 50 MCG/ACT nasal spray Administer 1 spray in each nostril twice daily.    furosemide (LASIX) 40 MG tablet Take 1 tablet by mouth 2 (Two) Times a Day.    rosuvastatin (CRESTOR) 10 MG tablet Take 1 tablet by mouth Every Night.    sacubitril-valsartan (ENTRESTO) 49-51 MG tablet Take 1 tablet by mouth 2 (Two) Times a Day.    spironolactone (ALDACTONE) 25 MG tablet Take 1 tablet by mouth Daily.    Testosterone Cypionate (DEPOTESTOTERONE CYPIONATE) 200 MG/ML injection Inject 0.5 mL into the appropriate muscle as directed by prescriber 1 (One) Time Per Week.    Xarelto 20 MG tablet 1 tablet Daily With Dinner. INSTRUCTED TO HOLD PER MD 2 TO 3 DAYS BEFORE SURGERY    [DISCONTINUED] Semaglutide-Weight Management (Wegovy) 0.5 MG/0.5ML solution auto-injector Inject 0.5 mL under the skin into the appropriate area as directed 1 (One) Time Per Week.     No current facility-administered medications on file prior to visit.          HISTORY OF PRESENT ILLNESS     Taking Wegovy 0.5 mg without significant side effects. Had " "mild nausea but improved after few doses. His weight remains about the same.     Patient Care Team:  Lico Golden MD as PCP - General (Internal Medicine)  Kim Mcmillan MD as Consulting Physician (Cardiology)  Chacorta Salgado Jr., MD as Consulting Physician (Urology)  Sid Sue MD as Consulting Physician (Cardiology)  Marta Lal MD as Surgeon (Orthopedic Surgery)    REVIEW OF SYSTEMS     Review of Systems   GI negative     PHYSICAL EXAMINATION     Physical Exam  Alert with normal thought and judgment.   Obese       REVIEWED DATA     Labs:     Lab Results   Component Value Date     09/15/2023    K 4.0 02/27/2024    CALCIUM 9.8 09/15/2023    AST 30 09/15/2023    ALT 43 (H) 09/15/2023    BUN 13 09/15/2023    CREATININE 1.23 09/15/2023    CREATININE 1.00 06/06/2023    CREATININE 1.24 09/22/2022    EGFRRESULT 80.0 09/15/2023       Lab Results   Component Value Date    HGBA1C 5.40 09/15/2023    HGBA1C 5.5 09/22/2022    HGBA1C 4.95 03/12/2018       Lab Results   Component Value Date    LDL 70 09/15/2023    LDL 78 03/20/2023     (H) 09/22/2022    HDL 33 (L) 09/15/2023    HDL 36 (L) 03/20/2023    TRIG 94 09/15/2023    TRIG 110 03/20/2023       Lab Results   Component Value Date    TSH 1.700 09/15/2023    TSH 1.580 09/22/2022    TSH 2.720 12/27/2020    FREET4 1.18 09/15/2023    FREET4 1.18 09/22/2022    FREET4 0.94 05/11/2021       Lab Results   Component Value Date    WBC 6.68 12/24/2023    HGB 16.9 12/24/2023     12/24/2023       No results found for: \"MALBCRERATIO\"          Imaging:               Medical Tests:               Summary of old records / correspondence / consultant report:             Request outside records:       "

## 2024-10-21 ENCOUNTER — PATIENT MESSAGE (OUTPATIENT)
Dept: INTERNAL MEDICINE | Age: 34
End: 2024-10-21
Payer: COMMERCIAL

## 2024-10-21 DIAGNOSIS — E66.812 CLASS 2 SEVERE OBESITY DUE TO EXCESS CALORIES WITH SERIOUS COMORBIDITY AND BODY MASS INDEX (BMI) OF 36.0 TO 36.9 IN ADULT: ICD-10-CM

## 2024-10-21 DIAGNOSIS — E66.01 CLASS 2 SEVERE OBESITY DUE TO EXCESS CALORIES WITH SERIOUS COMORBIDITY AND BODY MASS INDEX (BMI) OF 36.0 TO 36.9 IN ADULT: ICD-10-CM

## 2024-10-21 RX ORDER — SEMAGLUTIDE 1 MG/.5ML
1 INJECTION, SOLUTION SUBCUTANEOUS WEEKLY
Qty: 2 ML | Refills: 2 | Status: SHIPPED | OUTPATIENT
Start: 2024-10-21

## 2024-11-18 DIAGNOSIS — E66.01 CLASS 2 SEVERE OBESITY DUE TO EXCESS CALORIES WITH SERIOUS COMORBIDITY AND BODY MASS INDEX (BMI) OF 36.0 TO 36.9 IN ADULT: Primary | Chronic | ICD-10-CM

## 2024-11-18 DIAGNOSIS — E66.812 CLASS 2 SEVERE OBESITY DUE TO EXCESS CALORIES WITH SERIOUS COMORBIDITY AND BODY MASS INDEX (BMI) OF 36.0 TO 36.9 IN ADULT: Primary | Chronic | ICD-10-CM

## 2024-11-18 RX ORDER — SEMAGLUTIDE 1.7 MG/.75ML
1.7 INJECTION, SOLUTION SUBCUTANEOUS WEEKLY
Qty: 3 ML | Refills: 1 | Status: SHIPPED | OUTPATIENT
Start: 2024-11-18

## 2024-11-19 ENCOUNTER — PRIOR AUTHORIZATION (OUTPATIENT)
Dept: INTERNAL MEDICINE | Age: 34
End: 2024-11-19
Payer: COMMERCIAL

## 2024-11-19 NOTE — TELEPHONE ENCOUNTER
Wegovy 1MG/0.5ML auto-injectors      [facility]      Information regarding your request  Clinical Override not needed

## 2024-12-23 ENCOUNTER — OFFICE VISIT (OUTPATIENT)
Dept: INTERNAL MEDICINE | Age: 34
End: 2024-12-23
Payer: COMMERCIAL

## 2024-12-23 VITALS
OXYGEN SATURATION: 97 % | HEIGHT: 71 IN | DIASTOLIC BLOOD PRESSURE: 70 MMHG | HEART RATE: 110 BPM | WEIGHT: 257 LBS | TEMPERATURE: 96.9 F | SYSTOLIC BLOOD PRESSURE: 108 MMHG | BODY MASS INDEX: 35.98 KG/M2

## 2024-12-23 DIAGNOSIS — Z00.00 ENCOUNTER FOR ANNUAL HEALTH EXAMINATION: ICD-10-CM

## 2024-12-23 DIAGNOSIS — E29.1 HYPOGONADISM IN MALE: Chronic | ICD-10-CM

## 2024-12-23 DIAGNOSIS — E66.812 CLASS 2 SEVERE OBESITY DUE TO EXCESS CALORIES WITH SERIOUS COMORBIDITY AND BODY MASS INDEX (BMI) OF 35.0 TO 35.9 IN ADULT: Primary | Chronic | ICD-10-CM

## 2024-12-23 DIAGNOSIS — I48.0 PAROXYSMAL ATRIAL FIBRILLATION: Chronic | ICD-10-CM

## 2024-12-23 DIAGNOSIS — G47.33 OSA ON CPAP: Chronic | ICD-10-CM

## 2024-12-23 DIAGNOSIS — E78.5 HYPERLIPIDEMIA, UNSPECIFIED HYPERLIPIDEMIA TYPE: Chronic | ICD-10-CM

## 2024-12-23 DIAGNOSIS — E66.01 CLASS 2 SEVERE OBESITY DUE TO EXCESS CALORIES WITH SERIOUS COMORBIDITY AND BODY MASS INDEX (BMI) OF 35.0 TO 35.9 IN ADULT: Primary | Chronic | ICD-10-CM

## 2024-12-23 PROCEDURE — 99214 OFFICE O/P EST MOD 30 MIN: CPT | Performed by: INTERNAL MEDICINE

## 2024-12-23 PROCEDURE — 90656 IIV3 VACC NO PRSV 0.5 ML IM: CPT | Performed by: INTERNAL MEDICINE

## 2024-12-23 PROCEDURE — 90471 IMMUNIZATION ADMIN: CPT | Performed by: INTERNAL MEDICINE

## 2024-12-23 NOTE — ASSESSMENT & PLAN NOTE
His urologist left the practice. He will attempt to establish with another provider.  In the meantime continue testosterone replacement therapy.  Will add PSA to his upcoming labs for physical.

## 2024-12-23 NOTE — ASSESSMENT & PLAN NOTE
Currently taking Wegovy 1.7 mg weekly without significant weight change.   Will switch to Zepbound 7.5 mg weekly.     Advised to watch for nausea/vomiting, abdominal pain, constipation, and diarrhea.      Wt Readings from Last 3 Encounters:   12/23/24 117 kg (257 lb)   09/13/24 117 kg (259 lb)   01/23/24 115 kg (253 lb)     Body mass index is 35.84 kg/m².

## 2024-12-23 NOTE — LETTER
[facility]  Vaccine Consent Form    Patient Name:  Shaheen Rivera  Patient :  1990     Vaccine(s) Ordered    Fluzone >6mos ([id number])        Screening Checklist  The following questions should be completed prior to vaccination. If you answer “yes” to any question, it does not necessarily mean you should not be vaccinated. It just means we may need to clarify or ask more questions. If a question is unclear, please ask your healthcare provider to explain it.    Yes No   Any fever or moderate to severe illness today (mild illness and/or antibiotic treatment are not contraindications)?     Do you have a history of a serious reaction to any previous vaccinations, such as anaphylaxis, encephalopathy within 7 days, Guillain-[place] syndrome within 6 weeks, seizure?     Have you received any live vaccine(s) (e.g MMR, MACY) or any other vaccines in the last month (to ensure duplicate doses aren't given)?     Do you have an anaphylactic allergy to latex (DTaP, DTaP-IPV, Hep A, Hep B, MenB, RV, Td, Tdap), baker’s yeast (Hep B, HPV), polysorbates (RSV, nirsevimab, PCV 20, Rotavirrus, Tdap, Shingrix), or gelatin (MACY, MMR)?     Do you have an anaphylactic allergy to neomycin (Rabies, MACY, MMR, IPV, Hep A), polymyxin B (IPV), or streptomycin (IPV)?      Any cancer, leukemia, AIDS, or other immune system disorder? (MACY, MMR, RV)     Do you have a parent, brother, or sister with an immune system problem (if immune competence of vaccine recipient clinically verified, can proceed)? (MMR, MACY)     Any recent steroid treatments for >2 weeks, chemotherapy, or radiation treatment? (MACY, MMR)     Have you received antibody-containing blood transfusions or IVIG in the past 11 months (recommended interval is dependent on product)? (MMR, MACY)     Have you taken antiviral drugs (acyclovir, famciclovir, valacyclovir for MACY) in the last 24 or 48 hours, respectively?      Are you pregnant or planning to become pregnant within 1  "month? (MACY, MMR, HPV, IPV, MenB, Abrexvy; For Hep B- refer to Engerix-B; For RSV - Abrysvo is indicated for 32-36 weeks of pregnancy from September to January)     For infants, have you ever been told your child has had intussusception or a medical emergency involving obstruction of the intestine (Rotavirus)? If not for an infant, can skip this question.         *Ordering Physicians/APC should be consulted if \"yes\" is checked by the patient or guardian above.  I have received, read, and understand the Vaccine Information Statement (VIS) for each vaccine ordered.  I have considered my or my child's health status as well as the health status of my close contacts.  I have taken the opportunity to discuss my vaccine questions with my or my child's health care provider.   I have requested that the ordered vaccine(s) be given to me or my child.  I understand the benefits and risks of the vaccines.  I understand that I should remain in the clinic for 15 minutes after receiving the vaccine(s).  _________________________________________________________  Signature of Patient or Parent/Legal Guardian ____________________  Date     "

## 2024-12-23 NOTE — PROGRESS NOTES
J  U  N  O  H    K  I  M ,   M  D                  I  N  T  E  R  N  A  L    M  E  D  I  C  I  N  E         ENCOUNTER DATE:  12/23/2024    Shaheen Rivera / 34 y.o. / male    OFFICE VISIT ENCOUNTER       CHIEF COMPLAINT / REASON FOR OFFICE VISIT     Obesity      ASSESSMENT & PLAN     Problem List Items Addressed This Visit          High    Class 2 severe obesity due to excess calories with serious comorbidity and body mass index (BMI) of 35.0 to 35.9 in adult - Primary (Chronic)    Current Assessment & Plan     Currently taking Wegovy 1.7 mg weekly without significant weight change.   Will switch to Zepbound 7.5 mg weekly.     Advised to watch for nausea/vomiting, abdominal pain, constipation, and diarrhea.      Wt Readings from Last 3 Encounters:   12/23/24 117 kg (257 lb)   09/13/24 117 kg (259 lb)   01/23/24 115 kg (253 lb)     Body mass index is 35.84 kg/m².            Relevant Medications    Tirzepatide-Weight Management (ZEPBOUND) 7.5 MG/0.5ML solution auto-injector       Medium    Hypogonadism in male (Chronic)    Overview     Managed by urologist          Current Assessment & Plan     His urologist left the practice. He will attempt to establish with another provider.  In the meantime continue testosterone replacement therapy.  Will add PSA to his upcoming labs for physical.         Relevant Orders    PSA DIAGNOSTIC    Atrial fibrillation (Chronic)    Overview     Continue Xarelto and carvedilol          Relevant Medications    carvedilol (COREG) 12.5 MG tablet    sacubitril-valsartan (ENTRESTO) 49-51 MG tablet    Hyperlipidemia (Chronic)    Overview     Continue rosuvastatin 10 mg (managed by cardiologist)         Relevant Medications    rosuvastatin (CRESTOR) 10 MG tablet       Low    GOKUL on CPAP (Chronic)    Overview     Continue CPAP.           Other Visit Diagnoses       Encounter for annual health examination        Relevant Orders    CBC & Differential    Comprehensive  Metabolic Panel    Hemoglobin A1c    Lipid Panel With / Chol / HDL Ratio    TSH+Free T4    Urinalysis With Microscopic If Indicated (No Culture) - Urine, Clean Catch          Orders Placed This Encounter   Procedures    Fluzone >6mos (0300-0078)    Comprehensive Metabolic Panel     Standing Status:   Future     Standing Expiration Date:   1/27/2026     Order Specific Question:   Release to patient     Answer:   Routine Release [[id number]]    Hemoglobin A1c     Standing Status:   Future     Standing Expiration Date:   1/27/2026     Order Specific Question:   Release to patient     Answer:   Routine Release [[id number]]    Lipid Panel With / Chol / HDL Ratio     Standing Status:   Future     Standing Expiration Date:   1/27/2026     Order Specific Question:   Release to patient     Answer:   Routine Release [[id number]]    TSH+Free T4     Standing Status:   Future     Standing Expiration Date:   1/27/2026     Order Specific Question:   Release to patient     Answer:   Routine Release [[id number]]    Urinalysis With Microscopic If Indicated (No Culture) - Urine, Clean Catch     Standing Status:   Future     Standing Expiration Date:   1/27/2026     Order Specific Question:   Release to patient     Answer:   Routine Release [[id number]]    PSA DIAGNOSTIC     Standing Status:   Future     Standing Expiration Date:   12/23/2025     Order Specific Question:   Release to patient     Answer:   Routine Release [[id number]]    CBC & Differential     Standing Status:   Future     Standing Expiration Date:   1/27/2026     Order Specific Question:   Manual Differential     Answer:   No     Order Specific Question:   Release to patient     Answer:   Routine Release [[id number]]     New Medications Ordered This Visit   Medications    Tirzepatide-Weight Management (ZEPBOUND) 7.5 MG/0.5ML solution auto-injector     Sig: Inject 0.5 mL under the skin into the appropriate area as directed 1 (One) Time Per Week.     Dispense:  2 mL      "Refill:  0       SUMMARY/DISCUSSION        TOTAL TIME OF ENCOUNTER:        Next Appointment with me: 1/6/2025    Return for Next scheduled followup for CPE.      VITAL SIGNS     Vitals:    12/23/24 1325   BP: 108/70   Pulse: 110   Temp: 96.9 °F (36.1 °C)   SpO2: 97%   Weight: 117 kg (257 lb)   Height: 180.3 cm (71\")       BP Readings from Last 3 Encounters:   12/23/24 108/70   09/13/24 126/82   01/23/24 110/84     Wt Readings from Last 3 Encounters:   12/23/24 117 kg (257 lb)   09/13/24 117 kg (259 lb)   01/23/24 115 kg (253 lb)     Body mass index is 35.84 kg/m².    Blood pressure readings recorded on patient flowsheet:       No data to display                  MEDICATIONS AT THE TIME OF OFFICE VISIT     Current Outpatient Medications on File Prior to Visit   Medication Sig    acyclovir (ZOVIRAX) 200 MG capsule Take 1 capsule by mouth 3 (Three) Times a Day.    albuterol sulfate  (90 Base) MCG/ACT inhaler Inhale 2 puffs orally every 4 hours as needed for wheezing or shortness of air. (Patient taking differently: Inhale 2 puffs Every 4 (Four) Hours As Needed.)    anastrozole (ARIMIDEX) 1 MG tablet Take 1 tablet by mouth Daily.    B-D 3CC LUER-IGNACIA SYR 25CP9-5/2 22G X 1-1/2\" 3 ML misc     BD Hypodermic Needle 18G X 1\" misc     carvedilol (COREG) 12.5 MG tablet Take 1.5 tablets by mouth 2 (Two) Times a Day.    cetirizine (zyrTEC) 10 MG tablet Take 1 tablet by mouth Every Night. (Patient taking differently: Take 1 tablet by mouth Every Morning.)    Cyanocobalamin (B-12-SL) 1000 MCG sublingual tablet Place 1 tablet under the tongue Daily.    fluticasone (FLONASE) 50 MCG/ACT nasal spray Administer 1 spray in each nostril twice daily.    furosemide (LASIX) 40 MG tablet Take 1 tablet by mouth 2 (Two) Times a Day.    rosuvastatin (CRESTOR) 10 MG tablet Take 1 tablet by mouth Every Night.    sacubitril-valsartan (ENTRESTO) 49-51 MG tablet Take 1 tablet by mouth 2 (Two) Times a Day.    spironolactone (ALDACTONE) 25 MG " tablet Take 1 tablet by mouth Daily.    Testosterone Cypionate (DEPOTESTOTERONE CYPIONATE) 200 MG/ML injection Inject 0.5 mL into the appropriate muscle as directed by prescriber 1 (One) Time Per Week.    Xarelto 20 MG tablet 1 tablet Daily With Dinner. INSTRUCTED TO HOLD PER MD 2 TO 3 DAYS BEFORE SURGERY    [DISCONTINUED] Semaglutide-Weight Management (Wegovy) 1.7 MG/0.75ML solution auto-injector Inject 0.75 mL under the skin into the appropriate area as directed 1 (One) Time Per Week.     No current facility-administered medications on file prior to visit.          HISTORY OF PRESENT ILLNESS     Patient is on Wegovy 1.7 mg weekly with minimal weight change.  His current weight is 257 pounds and BMI is 35.84.  Denies major GI side effects.  He is open to switching to Zepbound for additional weight loss.  He exercises regularly but his aerobic exercise is limited to [employer] due to his bad knees.  He has been building more muscle mass which may in part explain lack of change in weight.  He is on testosterone replacement therapy weekly through his urologist who has left the practice.  He is in the process of establishing with a new provider.  He remains on Xarelto and carvedilol for paroxysmal atrial fibrillation.  He is on rosuvastatin 10 mg for hyperlipidemia.    Patient Care Team:  Lico Golden MD as PCP - General (Internal Medicine)  Kim Mcmillan MD as Consulting Physician (Cardiology)  Chacorta Salgado Jr., MD as Consulting Physician (Urology)  Sid Sue MD as Consulting Physician (Cardiology)  Marta Lal MD as Surgeon (Orthopedic Surgery)    REVIEW OF SYSTEMS     Review of Systems       PHYSICAL EXAMINATION     Physical Exam  Alert with normal thought and judgment.   Normal affect and mood.   He is built thick with high muscle/bone mass.   Mild central weight excess.       REVIEWED DATA     Labs:     Lab Results   Component Value Date     09/15/2023    K 4.0 02/27/2024    CALCIUM 9.8  "09/15/2023    AST 30 09/15/2023    ALT 43 (H) 09/15/2023    BUN 13 09/15/2023    CREATININE 1.23 09/15/2023    CREATININE 1.00 06/06/2023    CREATININE 1.24 09/22/2022    EGFRRESULT 80.0 09/15/2023     Lab Results   Component Value Date    HGBA1C 5.40 09/15/2023    HGBA1C 5.5 09/22/2022    HGBA1C 4.95 03/12/2018     Lab Results   Component Value Date    LDL 70 09/15/2023    LDL 78 03/20/2023     (H) 09/22/2022    HDL 33 (L) 09/15/2023    HDL 36 (L) 03/20/2023    TRIG 94 09/15/2023    TRIG 110 03/20/2023     Lab Results   Component Value Date    TSH 1.700 09/15/2023    TSH 1.580 09/22/2022    TSH 2.720 12/27/2020    FREET4 1.18 09/15/2023    FREET4 1.18 09/22/2022    FREET4 0.94 05/11/2021     Lab Results   Component Value Date    WBC 6.68 12/24/2023    HGB 16.9 12/24/2023     12/24/2023     No results found for: \"MALBCRERATIO\"        Imaging:               Medical Tests:               Summary of old records / correspondence / consultant report:             Request outside records:       "

## 2024-12-31 ENCOUNTER — PRIOR AUTHORIZATION (OUTPATIENT)
Dept: INTERNAL MEDICINE | Age: 34
End: 2024-12-31
Payer: COMMERCIAL

## 2024-12-31 NOTE — TELEPHONE ENCOUNTER
Zepbound 7.5MG/0.5ML pen-injectors    [employer]         Outcome  Approved today by [employer] 2017  CaseId:[id number];Status:Approved;Review Type:Prior Auth;Coverage Start Date:12/01/2024;Coverage End Date:08/28/2025;  Authorization Expiration Date: 8/27/2025

## 2025-01-13 DIAGNOSIS — E66.01 CLASS 2 SEVERE OBESITY DUE TO EXCESS CALORIES WITH SERIOUS COMORBIDITY AND BODY MASS INDEX (BMI) OF 35.0 TO 35.9 IN ADULT: Primary | Chronic | ICD-10-CM

## 2025-01-13 DIAGNOSIS — E66.812 CLASS 2 SEVERE OBESITY DUE TO EXCESS CALORIES WITH SERIOUS COMORBIDITY AND BODY MASS INDEX (BMI) OF 35.0 TO 35.9 IN ADULT: Primary | Chronic | ICD-10-CM

## 2025-01-14 ENCOUNTER — TELEPHONE (OUTPATIENT)
Dept: INTERNAL MEDICINE | Age: 35
End: 2025-01-14

## 2025-01-14 NOTE — TELEPHONE ENCOUNTER
Caller: FIORELLA    Relationship: PHARMACY    Best call back number: [phone]       What was the call regarding: PATIENT HAS RX'S FOR ZEPBOUND AND WEGOVY AND THEY NEED TO KNOW WHICH IS CORRECT . PLEASE CALL.     [facility] - [place], [place] - [address] AT [address] AND [address] - [phone]  - [phone]

## 2025-01-17 ENCOUNTER — TELEPHONE (OUTPATIENT)
Dept: INTERNAL MEDICINE | Age: 35
End: 2025-01-17
Payer: COMMERCIAL

## 2025-01-17 NOTE — TELEPHONE ENCOUNTER
Patient said that he noticed someone from our office called, but did not leave a message.    I told him that I saw that we had called him earlier in the month when we had to reschedule his appointment from the snow day.     I also read him the message about his medication on the 15th regarding the zepound.     Please call patient back if there is additional information they need to know.

## 2025-01-28 ENCOUNTER — TELEPHONE (OUTPATIENT)
Dept: ORTHOPEDIC SURGERY | Facility: CLINIC | Age: 35
End: 2025-01-28
Payer: COMMERCIAL

## 2025-01-28 NOTE — TELEPHONE ENCOUNTER
I think this is a medical device I do not know why they would request records and I do not think we can send them to them obviously without his permission and the ideas on this

## 2025-01-28 NOTE — TELEPHONE ENCOUNTER
[facility] received a fax that requires your attention. The document has been indexed to the patient’s chart for your review.      Reason for sending: RECEIVED RECORDS REQUEST FROM [employer]    Documents Description: RECORDS REQUEST-[employer]-1/28/2025    Name of Sender: [employer]    Date Indexed: 1/28/2025

## 2025-02-04 ENCOUNTER — PATIENT MESSAGE (OUTPATIENT)
Dept: INTERNAL MEDICINE | Age: 35
End: 2025-02-04
Payer: COMMERCIAL

## 2025-02-04 DIAGNOSIS — E66.812 CLASS 2 SEVERE OBESITY DUE TO EXCESS CALORIES WITH SERIOUS COMORBIDITY AND BODY MASS INDEX (BMI) OF 35.0 TO 35.9 IN ADULT: Primary | ICD-10-CM

## 2025-02-04 DIAGNOSIS — E66.01 CLASS 2 SEVERE OBESITY DUE TO EXCESS CALORIES WITH SERIOUS COMORBIDITY AND BODY MASS INDEX (BMI) OF 35.0 TO 35.9 IN ADULT: Primary | ICD-10-CM

## 2025-02-24 ENCOUNTER — OFFICE VISIT (OUTPATIENT)
Dept: INTERNAL MEDICINE | Age: 35
End: 2025-02-24
Payer: COMMERCIAL

## 2025-02-24 VITALS
TEMPERATURE: 96.9 F | HEART RATE: 83 BPM | DIASTOLIC BLOOD PRESSURE: 82 MMHG | WEIGHT: 250 LBS | BODY MASS INDEX: 35 KG/M2 | OXYGEN SATURATION: 95 % | SYSTOLIC BLOOD PRESSURE: 110 MMHG | HEIGHT: 71 IN

## 2025-02-24 DIAGNOSIS — E29.1 HYPOGONADISM IN MALE: Chronic | ICD-10-CM

## 2025-02-24 DIAGNOSIS — Z00.00 ENCOUNTER FOR ANNUAL HEALTH EXAMINATION: Primary | ICD-10-CM

## 2025-02-24 DIAGNOSIS — E66.811 CLASS 1 OBESITY DUE TO EXCESS CALORIES WITH SERIOUS COMORBIDITY AND BODY MASS INDEX (BMI) OF 34.0 TO 34.9 IN ADULT: Chronic | ICD-10-CM

## 2025-02-24 DIAGNOSIS — I48.0 PAROXYSMAL ATRIAL FIBRILLATION: Chronic | ICD-10-CM

## 2025-02-24 DIAGNOSIS — E78.5 HYPERLIPIDEMIA, UNSPECIFIED HYPERLIPIDEMIA TYPE: Chronic | ICD-10-CM

## 2025-02-24 DIAGNOSIS — E66.09 CLASS 1 OBESITY DUE TO EXCESS CALORIES WITH SERIOUS COMORBIDITY AND BODY MASS INDEX (BMI) OF 34.0 TO 34.9 IN ADULT: Chronic | ICD-10-CM

## 2025-02-24 PROCEDURE — 99213 OFFICE O/P EST LOW 20 MIN: CPT | Performed by: INTERNAL MEDICINE

## 2025-02-24 PROCEDURE — 99395 PREV VISIT EST AGE 18-39: CPT | Performed by: INTERNAL MEDICINE

## 2025-02-24 NOTE — ASSESSMENT & PLAN NOTE
Wt Readings from Last 3 Encounters:   02/24/25 113 kg (250 lb)   12/23/24 117 kg (257 lb)   09/13/24 117 kg (259 lb)     Body mass index is 34.87 kg/m².     Doing better on Zepbound 7.5 mg. Continue same.

## 2025-02-24 NOTE — PROGRESS NOTES
"        J  U  N  O  H    K  I  M ,   M  D      I  N  T  E  R  N  A  L    M  E  D  I  C  I  N  E         ENCOUNTER DATE:  02/24/2025    Shaheen Rivera / 34 y.o. / male    ANNUAL PREVENTATIVE / PHYSICAL ENCOUNTER       CHIEF COMPLAINT     Annual Exam (9/22/23)      VITALS     Vitals:    02/24/25 1527   BP: 110/82   Pulse: 83   Temp: 96.9 °F (36.1 °C)   SpO2: 95%   Weight: 113 kg (250 lb)   Height: 180.3 cm (71\")       BP Readings from Last 3 Encounters:   02/24/25 110/82   12/23/24 108/70   09/13/24 126/82     Wt Readings from Last 3 Encounters:   02/24/25 113 kg (250 lb)   12/23/24 117 kg (257 lb)   09/13/24 117 kg (259 lb)      Body mass index is 34.87 kg/m².    Blood pressure readings recorded on patient flowsheet:       No data to display                MEDICATIONS     Current Outpatient Medications on File Prior to Visit   Medication Sig Dispense Refill    acyclovir (ZOVIRAX) 200 MG capsule Take 1 capsule by mouth 3 (Three) Times a Day.      albuterol sulfate  (90 Base) MCG/ACT inhaler Inhale 2 puffs orally every 4 hours as needed for wheezing or shortness of air. (Patient taking differently: Inhale 2 puffs Every 4 (Four) Hours As Needed.) 8.5 g 3    anastrozole (ARIMIDEX) 1 MG tablet Take 1 tablet by mouth Daily.      B-D 3CC LUER-IGNACIA SYR [id number] 22G X 1-1/2\" 3 ML misc       BD Hypodermic Needle 18G X 1\" misc       carvedilol (COREG) 12.5 MG tablet Take 1.5 tablets by mouth 2 (Two) Times a Day.      cetirizine (zyrTEC) 10 MG tablet Take 1 tablet by mouth Every Night. (Patient taking differently: Take 1 tablet by mouth Every Morning.)      Cyanocobalamin (B-12-SL) 1000 MCG sublingual tablet Place 1 tablet under the tongue Daily.      fluticasone (FLONASE) 50 MCG/ACT nasal spray Administer 1 spray in each nostril twice daily. 16 g 1    furosemide (LASIX) 40 MG tablet Take 1 tablet by mouth 2 (Two) Times a Day.      rosuvastatin (CRESTOR) 10 MG tablet Take 1 tablet by mouth Every Night.      " sacubitril-valsartan (ENTRESTO) 49-51 MG tablet Take 1 tablet by mouth 2 (Two) Times a Day.      spironolactone (ALDACTONE) 25 MG tablet Take 1 tablet by mouth Daily.      Testosterone Cypionate (DEPOTESTOTERONE CYPIONATE) 200 MG/ML injection Inject 0.5 mL into the appropriate muscle as directed by prescriber 1 (One) Time Per Week.      Tirzepatide-Weight Management (ZEPBOUND) 7.5 MG/0.5ML solution auto-injector Inject 0.5 mL under the skin into the appropriate area as directed 1 (One) Time Per Week. 2 mL 0    Xarelto 20 MG tablet 1 tablet Daily With Dinner. INSTRUCTED TO HOLD PER MD 2 TO 3 DAYS BEFORE SURGERY       No current facility-administered medications on file prior to visit.         HISTORY OF PRESENT ILLNESS      Shaheen presents for annual health maintenance visit.    Patient reports doing better regarding his weight with switch to Zepbound on his last visit on 12/23/2024.  He prefers to stay at the current dose at this time.  He is currently on Zepbound 7.5 mg without significant GI side effects.  He remains on testosterone through his urologist.  Atrial fibrillation remains stable on carvedilol and Xarelto.  He is followed by his cardiologist.  He denies any chest pains, increased dyspnea exertion or palpitations.  LDL cholesterol is improved.  He is on rosuvastatin 10 mg.    Patient Care Team:  Lico Golden MD as PCP - General (Internal Medicine)  Kim Mcmillan MD as Consulting Physician (Cardiology)  Chacorta Salgado Jr., MD as Consulting Physician (Urology)  Sid Sue MD as Consulting Physician (Cardiology)  Marta Lal MD as Surgeon (Orthopedic Surgery)    General health: multiple medical problems  Lifestyle:  Attempting to lose weight?: Yes   Diet: eating healthier  Exercise: exercises 5 days weekly  Tobacco: Never used  Alcohol: occasional/infrequent  Work: Full-time  Reproductive health:  Sexually active?: No   Concern for STD?: No   Sexual problems?: No problems   Sees  Urologist?: Yes for low T  Depression Screening:      PHQ-2 Depression Screening  Little interest or pleasure in doing things?     Feeling down, depressed, or hopeless?     PHQ-2 Total Score              9/13/2024     8:44 AM   PHQ-2/PHQ-9 Depression Screening   Retired Little Interest or Pleasure in Doing Things 0-->not at all   Retired Feeling Down, Depressed or Hopeless 0-->not at all   Retired PHQ-9: Brief Depression Severity Measure Score 0        PHQ-2: 0 (Not depressed)     PHQ-9: 0 (Negative screening for depression)    ______________________________________________________________________    ALLERGIES  Allergies   Allergen Reactions    Amoxicillin-Pot Clavulanate Hives        PFSH:     The following portions of the patient's history were reviewed and updated as appropriate: Allergies / Current Medications / Past Medical History / Surgical History / Social History / Family History    PROBLEM LIST   Patient Active Problem List   Diagnosis    Vitamin B 12 deficiency    Hypogonadism in male    Retinitis pigmentosa of both eyes    ADHD (attention deficit hyperactivity disorder)    GOKUL on CPAP    Atrial fibrillation    Rupture of anterior cruciate ligament of right knee    Clinical diagnosis of COVID-19    Hyperlipidemia    Chronic pain of right knee    Tear of medial meniscus of right knee, current    Class 1 obesity due to excess calories with serious comorbidity and body mass index (BMI) of 34.0 to 34.9 in adult       PAST MEDICAL HISTORY  Past Medical History:   Diagnosis Date    ADHD     Asthma     Atrial fibrillation 12/28/2020    CHF (congestive heart failure)     Clinical diagnosis of COVID-19 12/23/2021    COVID     Monoclonal Antibody Infusion on 12/28/2021    COVID-19 vaccine administered     HSV-2 (herpes simplex virus 2) infection     HSV-2 infection 01/09/2019    Hx monoclonal drug therapy 12/23/2021    BAM-COVID [place]    Low testosterone in male     Right knee pain     Sleep apnea     CPAP    Splenic  infarct     Testosterone deficiency     Vitamin B 12 deficiency        SURGICAL HISTORY  Past Surgical History:   Procedure Laterality Date    ADENOIDECTOMY      APPENDECTOMY      CARDIOVERSION      KNEE ACL RECONSTRUCTION Right 01/18/2022    Procedure: KNEE ANTERIOR CRUCIATE LIGAMENT RECONSTRUCTION WITH AUTOGRAFT, RIGHT KNEE ARTHROSCOPY   ;  Surgeon: Marta Lal MD;  Location: [facility];  Service: Orthopedics;  Laterality: Right;    KNEE ARTHROSCOPY Right 06/13/2023    Procedure: KNEE ARTHROSCOPY with partial lateral menisectomy with debridement of medial femoral condyle  need s note barrett cardio about bl thinner;  Surgeon: Marta Lal MD;  Location: [facility];  Service: Orthopedics;  Laterality: Right;    NASAL SEPTUM SURGERY  03/2024    TONSILLECTOMY         SOCIAL HISTORY  Social History     Socioeconomic History    Marital status: Single   Tobacco Use    Smoking status: Never    Smokeless tobacco: Never   Vaping Use    Vaping status: Never Used   Substance and Sexual Activity    Alcohol use: Yes     Comment: OCCASIONAL     Drug use: No     Comment: former casual marijuana use (more than 2 years ago)    Sexual activity: Not Currently     Partners: Female       FAMILY HISTORY  Family History   Problem Relation Age of Onset    Lung cancer Mother 49        smoker    Graves' disease Mother     Lung cancer Father 52        smoker    Diabetes type II Brother     Coronary artery disease Paternal Grandmother     Thyroid disease Paternal Grandmother     Suicidality Maternal Uncle         committed suicide    Malig Hyperthermia Neg Hx     Prostate cancer Neg Hx     Colon cancer Neg Hx        IMMUNIZATION HISTORY  Immunization History   Administered Date(s) Administered    COVID-19 (MODERNA) 1st,2nd,3rd Dose Monovalent 03/02/2021, 03/31/2021, 11/14/2021    Flu Vaccine Intradermal Quad 18-64YR 10/20/2007    Flu Vaccine Quad PF 6-35MO 12/01/2018    Flu Vaccine Quad PF >36MO 12/29/2017    Flu Vaccine Split  Quad 10/20/2007    Fluzone  >6mos 12/23/2024    Fluzone (or Fluarix & Flulaval for VFC) >6mos 12/29/2017, 12/04/2018, 10/10/2021, 01/23/2024    Hepatitis A 01/28/2025    Hpv9 10/10/2021    Influenza, Unspecified 12/28/2020, 03/31/2022    Pneumococcal Conjugate 20-Valent (PCV20) 09/22/2022    Pneumococcal Polysaccharide (PPSV23) 12/28/2020    TD Preservative Free (Tenivac) 09/22/2022    Tdap 10/01/2012, 01/28/2025    Typhoid Inactivated 01/28/2025         REVIEW OF SYSTEMS     Review of Systems   Constitutional: Negative.    HENT: Negative.     Eyes: Negative.    Respiratory:  Positive for apnea (on cpap).    Cardiovascular: Negative.  Negative for chest pain and palpitations.   Gastrointestinal: Negative.  Negative for blood in stool.   Endocrine: Negative.    Genitourinary: Negative.    Musculoskeletal: Negative.    Skin: Negative.    Allergic/Immunologic: Negative.    Neurological: Negative.    Hematological: Negative.    Psychiatric/Behavioral: Negative.           PHYSICAL EXAMINATION     Physical Exam  Constitutional:       General: He is not in acute distress.     Appearance: He is well-developed.      Comments: Weight loss noted    HENT:      Head: Normocephalic and atraumatic.      Right Ear: Tympanic membrane, ear canal and external ear normal.      Left Ear: Tympanic membrane, ear canal and external ear normal.      Mouth/Throat:      Mouth: Mucous membranes are moist.      Pharynx: Oropharynx is clear.      Comments: Mellampati Airway Class 4   Eyes:      General: No scleral icterus.     Conjunctiva/sclera: Conjunctivae normal.      Pupils: Pupils are equal, round, and reactive to light.   Neck:      Thyroid: No thyroid mass or thyromegaly.      Vascular: No carotid bruit.      Trachea: No tracheal deviation.   Cardiovascular:      Rate and Rhythm: Normal rate and regular rhythm.      Pulses: Normal pulses.      Heart sounds: Normal heart sounds.      Comments: No carotid bruit  Pulmonary:      Effort:  Pulmonary effort is normal.      Breath sounds: Normal breath sounds.   Abdominal:      General: There is no distension.      Palpations: Abdomen is soft. There is no mass.      Tenderness: There is no abdominal tenderness.      Hernia: No hernia is present.   Musculoskeletal:      Cervical back: Neck supple.      Right lower leg: No edema.      Left lower leg: No edema.   Lymphadenopathy:      Cervical: No cervical adenopathy.      Upper Body:      Right upper body: No axillary adenopathy.      Left upper body: No axillary adenopathy.   Skin:     General: Skin is warm.      Coloration: Skin is not jaundiced or pale.      Findings: No lesion (Negative for suspicious skin lesions/growths) or rash.      Comments: No jaundice  No suspicious skin lesions.    Neurological:      Mental Status: He is alert and oriented to person, place, and time.      Cranial Nerves: No cranial nerve deficit.      Motor: No abnormal muscle tone.   Psychiatric:         Mood and Affect: Mood normal.         Behavior: Behavior normal.         Thought Content: Thought content normal.         Judgment: Judgment normal.         REVIEWED DATA      Labs:    Lab Results   Component Value Date     12/30/2024    K 4.4 12/30/2024    CALCIUM 9.5 12/30/2024    AST 28 12/30/2024    ALT 49 (H) 12/30/2024    BUN 14 12/30/2024    CREATININE 1.26 12/30/2024    CREATININE 1.23 09/15/2023    CREATININE 1.00 06/06/2023    EGFRIFNONA 103 01/04/2022    EGFRIFAFRI >60 04/13/2022     Lab Results   Component Value Date    GLUCOSE 95 12/30/2024    HGBA1C 4.90 12/30/2024    HGBA1C 5.40 09/15/2023    HGBA1C 5.5 09/22/2022    TSH 2.420 12/30/2024    FREET4 1.22 12/30/2024     Lab Results   Component Value Date    PSA 0.865 12/30/2024    PSA 0.673 03/12/2018     Lab Results   Component Value Date    TESTOSTERONE 321.50 03/12/2018    TESTOSTERONE 582.20 12/16/2017    TESTFRE 15.8 03/12/2018    TESTFRE 24.7 12/16/2017     Lab Results   Component Value Date    FSH  "4.76 03/12/2018     Lab Results   Component Value Date    LDL 57 12/30/2024    HDL 33 (L) 12/30/2024    TRIG 116 12/30/2024    CHOLHDLRATIO 3.36 12/30/2024   No components found for: \"HBTI237D\"  Lab Results   Component Value Date    WBC 6.73 12/30/2024    HGB 16.8 12/30/2024    MCV 91.4 12/30/2024     12/30/2024     Lab Results   Component Value Date    PROTEIN Negative 12/30/2024    GLUCOSEU Negative 12/30/2024    BLOODU Negative 12/30/2024    NITRITEU Negative 12/30/2024    LEUKOCYTESUR Negative 12/30/2024     Lab Results   Component Value Date    HEPCVIRUSABY Non Reactive 03/20/2023       Imaging:                   Medical Tests:                 Summary of old records / correspondence / consultant report:               Request outside records:         ASSESSMENT & PLAN     ANNUAL WELLNESS EXAM / PHYSICAL     Other medical problems addressed today:  Problem List Items Addressed This Visit          High    Class 1 obesity due to excess calories with serious comorbidity and body mass index (BMI) of 34.0 to 34.9 in adult (Chronic)    Current Assessment & Plan     Wt Readings from Last 3 Encounters:   02/24/25 113 kg (250 lb)   12/23/24 117 kg (257 lb)   09/13/24 117 kg (259 lb)     Body mass index is 34.87 kg/m².     Doing better on Zepbound 7.5 mg. Continue same.          Relevant Medications    Tirzepatide-Weight Management (ZEPBOUND) 7.5 MG/0.5ML solution auto-injector       Medium    Hypogonadism in male (Chronic)    Overview     Managed by urologist          Atrial fibrillation (Chronic)    Overview     Continue Xarelto and carvedilol          Relevant Medications    carvedilol (COREG) 12.5 MG tablet    sacubitril-valsartan (ENTRESTO) 49-51 MG tablet    Hyperlipidemia (Chronic)    Overview     Continue rosuvastatin 10 mg (managed by cardiologist)         Current Assessment & Plan        Lab Results   Component Value Date    LDL 57 12/30/2024    LDL 70 09/15/2023    LDL 78 03/20/2023    TRIG 116 12/30/2024 "    CHOLHDLRATIO 3.36 12/30/2024      LDL has improved.  Continue rosuvastatin 10 mg daily.         Relevant Medications    rosuvastatin (CRESTOR) 10 MG tablet     Other Visit Diagnoses       Encounter for annual health examination    -  Primary            No orders of the defined types were placed in this encounter.       Summary/Discussion:         Next Appointment with me: Visit date not found    Return in about 6 months (around 8/24/2025) for Reassess chronic medical problems.      HEALTHCARE MAINTENANCE ISSUES     Health Maintenance   Topic Date Due    ANNUAL PHYSICAL  09/22/2024    LIPID PANEL  12/30/2025    BMI FOLLOWUP  02/06/2026    TDAP/TD VACCINES (4 - Td or Tdap) 01/28/2035    HEPATITIS C SCREENING  Completed    INFLUENZA VACCINE  Completed    Pneumococcal Vaccine 0-49  Aged Out    COVID-19 Vaccine  Discontinued       Cancer Screening:  Colon: Initial/Next screening at age: 45  Repeat colon cancer screening: N/A at this time  Prostate: Performed today and recommended annual screening  Lung: Does not meet criteria for lung cancer screening.   Testicular: Recommended monthly self exam  Skin: Monthly self skin examination, annual exam by health professional  Other:    Miscellaneous Screening:  CV Screening: Lipid panel  DEXA (75+ or risk factors):   Other:     Immunization/Vaccinations:    Immunization History   Administered Date(s) Administered    COVID-19 (MODERNA) 1st,2nd,3rd Dose Monovalent 03/02/2021, 03/31/2021, 11/14/2021    Flu Vaccine Intradermal Quad 18-64YR 10/20/2007    Flu Vaccine Quad PF 6-35MO 12/01/2018    Flu Vaccine Quad PF >36MO 12/29/2017    Flu Vaccine Split Quad 10/20/2007    Fluzone  >6mos 12/23/2024    Fluzone (or Fluarix & Flulaval for VFC) >6mos 12/29/2017, 12/04/2018, 10/10/2021, 01/23/2024    Hepatitis A 01/28/2025    Hpv9 10/10/2021    Influenza, Unspecified 12/28/2020, 03/31/2022    Pneumococcal Conjugate 20-Valent (PCV20) 09/22/2022    Pneumococcal Polysaccharide (PPSV23)  12/28/2020    TD Preservative Free (Tenivac) 09/22/2022    Tdap 10/01/2012, 01/28/2025    Typhoid Inactivated 01/28/2025        Influenza: Patient had the flu shot this season  Pneumococcal: Up to date  COVID: Does not plan to get the latest booster  RSV: Not indicated  Tetanus/Pertussis: Up to date  Hepatitis A: Up to date  Hepatitis B: Up to date and Verify immunization records  Shingles: Not needed at this time  HPV: Up to date    Lifestyle Counseling:  Lifestyle Modifications: Attempt to lose weight, Continue good lifestyle choices/modifications, Improve dietary compliance, Maintain a low sugar/carbohydrate diet, Follow a low fat, low cholesterol diet, Maintain low sodium diet (< 3 gm) for blood pressure, Make dinner the lightest meal of day, Improve sleep hygiene, and Discussed sexual issues, safe sex practices, contraception  Safety Issues: Always wear seatbelt, Avoid texting while driving   Use sunscreen, regular skin examination  Recommended annual dental/vision examination.  Emotional/Stress/Sleep: Reviewed and  given when appropriate

## 2025-02-24 NOTE — ASSESSMENT & PLAN NOTE
Lab Results   Component Value Date    LDL 57 12/30/2024    LDL 70 09/15/2023    LDL 78 03/20/2023    TRIG 116 12/30/2024    CHOLHDLRATIO 3.36 12/30/2024      LDL has improved.  Continue rosuvastatin 10 mg daily.

## 2025-03-01 DIAGNOSIS — E66.01 CLASS 2 SEVERE OBESITY DUE TO EXCESS CALORIES WITH SERIOUS COMORBIDITY AND BODY MASS INDEX (BMI) OF 35.0 TO 35.9 IN ADULT: ICD-10-CM

## 2025-03-01 DIAGNOSIS — E66.812 CLASS 2 SEVERE OBESITY DUE TO EXCESS CALORIES WITH SERIOUS COMORBIDITY AND BODY MASS INDEX (BMI) OF 35.0 TO 35.9 IN ADULT: ICD-10-CM

## 2025-03-04 RX ORDER — TIRZEPATIDE 7.5 MG/.5ML
INJECTION, SOLUTION SUBCUTANEOUS
Qty: 2 ML | Refills: 1 | Status: SHIPPED | OUTPATIENT
Start: 2025-03-04

## 2025-04-02 DIAGNOSIS — E66.09 CLASS 1 OBESITY DUE TO EXCESS CALORIES WITH SERIOUS COMORBIDITY AND BODY MASS INDEX (BMI) OF 34.0 TO 34.9 IN ADULT: Primary | Chronic | ICD-10-CM

## 2025-04-02 DIAGNOSIS — E66.811 CLASS 1 OBESITY DUE TO EXCESS CALORIES WITH SERIOUS COMORBIDITY AND BODY MASS INDEX (BMI) OF 34.0 TO 34.9 IN ADULT: Primary | Chronic | ICD-10-CM

## 2025-04-02 NOTE — ASSESSMENT & PLAN NOTE
He is interested in titrating Zepbound to 10 mg.  His current reported weight is 246 pounds and denies significant side effects.  Prescription sent to his pharmacy.

## 2025-04-24 ENCOUNTER — PRIOR AUTHORIZATION (OUTPATIENT)
Dept: INTERNAL MEDICINE | Age: 35
End: 2025-04-24
Payer: COMMERCIAL

## 2025-04-24 NOTE — TELEPHONE ENCOUNTER
Zepbound 10MG/0.5ML pen-injectors    [facility]         Information regarding your request  CaseId:[id number];Status:Cancelled;Explanation:PA not Required. Medication is Covered;Appeal Information

## 2025-05-15 DIAGNOSIS — E66.09 CLASS 1 OBESITY DUE TO EXCESS CALORIES WITH SERIOUS COMORBIDITY AND BODY MASS INDEX (BMI) OF 34.0 TO 34.9 IN ADULT: Chronic | ICD-10-CM

## 2025-05-15 DIAGNOSIS — E66.811 CLASS 1 OBESITY DUE TO EXCESS CALORIES WITH SERIOUS COMORBIDITY AND BODY MASS INDEX (BMI) OF 34.0 TO 34.9 IN ADULT: Chronic | ICD-10-CM

## 2025-05-16 RX ORDER — TIRZEPATIDE 10 MG/.5ML
INJECTION, SOLUTION SUBCUTANEOUS
Qty: 2 ML | Refills: 0 | Status: SHIPPED | OUTPATIENT
Start: 2025-05-16

## 2025-06-10 DIAGNOSIS — E66.09 CLASS 1 OBESITY DUE TO EXCESS CALORIES WITH SERIOUS COMORBIDITY AND BODY MASS INDEX (BMI) OF 34.0 TO 34.9 IN ADULT: Chronic | ICD-10-CM

## 2025-06-10 DIAGNOSIS — E66.811 CLASS 1 OBESITY DUE TO EXCESS CALORIES WITH SERIOUS COMORBIDITY AND BODY MASS INDEX (BMI) OF 34.0 TO 34.9 IN ADULT: Chronic | ICD-10-CM

## 2025-06-12 RX ORDER — TIRZEPATIDE 10 MG/.5ML
INJECTION, SOLUTION SUBCUTANEOUS
Qty: 2 ML | Refills: 0 | Status: SHIPPED | OUTPATIENT
Start: 2025-06-12

## 2025-07-08 DIAGNOSIS — E66.811 CLASS 1 OBESITY DUE TO EXCESS CALORIES WITH SERIOUS COMORBIDITY AND BODY MASS INDEX (BMI) OF 34.0 TO 34.9 IN ADULT: Chronic | ICD-10-CM

## 2025-07-08 DIAGNOSIS — E66.09 CLASS 1 OBESITY DUE TO EXCESS CALORIES WITH SERIOUS COMORBIDITY AND BODY MASS INDEX (BMI) OF 34.0 TO 34.9 IN ADULT: Chronic | ICD-10-CM

## 2025-07-08 RX ORDER — TIRZEPATIDE 10 MG/.5ML
INJECTION, SOLUTION SUBCUTANEOUS
Qty: 2 ML | Refills: 2 | Status: SHIPPED | OUTPATIENT
Start: 2025-07-08

## 2025-08-25 ENCOUNTER — OFFICE VISIT (OUTPATIENT)
Dept: INTERNAL MEDICINE | Age: 35
End: 2025-08-25
Payer: COMMERCIAL

## 2025-08-25 VITALS
OXYGEN SATURATION: 97 % | BODY MASS INDEX: 32.76 KG/M2 | TEMPERATURE: 96.9 F | DIASTOLIC BLOOD PRESSURE: 70 MMHG | WEIGHT: 234 LBS | HEART RATE: 88 BPM | SYSTOLIC BLOOD PRESSURE: 120 MMHG | HEIGHT: 71 IN

## 2025-08-25 DIAGNOSIS — E78.00 PURE HYPERCHOLESTEROLEMIA: Chronic | ICD-10-CM

## 2025-08-25 DIAGNOSIS — E66.09 CLASS 1 OBESITY DUE TO EXCESS CALORIES WITH SERIOUS COMORBIDITY AND BODY MASS INDEX (BMI) OF 34.0 TO 34.9 IN ADULT: Primary | Chronic | ICD-10-CM

## 2025-08-25 DIAGNOSIS — E66.811 CLASS 1 OBESITY DUE TO EXCESS CALORIES WITH SERIOUS COMORBIDITY AND BODY MASS INDEX (BMI) OF 34.0 TO 34.9 IN ADULT: Primary | Chronic | ICD-10-CM

## 2025-08-25 DIAGNOSIS — I48.0 PAROXYSMAL ATRIAL FIBRILLATION: Chronic | ICD-10-CM

## 2025-08-25 LAB
ALBUMIN SERPL-MCNC: 4.7 G/DL (ref 3.5–5.2)
ALBUMIN/GLOB SERPL: 2.2 G/DL
ALP SERPL-CCNC: 65 U/L (ref 39–117)
ALT SERPL-CCNC: 40 U/L (ref 1–41)
AST SERPL-CCNC: 34 U/L (ref 1–40)
BILIRUB SERPL-MCNC: 0.8 MG/DL (ref 0–1.2)
BUN SERPL-MCNC: 12 MG/DL (ref 6–20)
BUN/CREAT SERPL: 9.4 (ref 7–25)
CALCIUM SERPL-MCNC: 9.1 MG/DL (ref 8.6–10.5)
CHLORIDE SERPL-SCNC: 101 MMOL/L (ref 98–107)
CHOLEST SERPL-MCNC: 97 MG/DL (ref 0–200)
CHOLEST/HDLC SERPL: 2.55 {RATIO}
CO2 SERPL-SCNC: 26.6 MMOL/L (ref 22–29)
CREAT SERPL-MCNC: 1.27 MG/DL (ref 0.76–1.27)
EGFRCR SERPLBLD CKD-EPI 2021: 76 ML/MIN/1.73
GLOBULIN SER CALC-MCNC: 2.1 GM/DL
GLUCOSE SERPL-MCNC: 86 MG/DL (ref 65–99)
HDLC SERPL-MCNC: 38 MG/DL (ref 40–60)
LDLC SERPL CALC-MCNC: 48 MG/DL (ref 0–100)
POTASSIUM SERPL-SCNC: 4.5 MMOL/L (ref 3.5–5.2)
PROT SERPL-MCNC: 6.8 G/DL (ref 6–8.5)
SODIUM SERPL-SCNC: 139 MMOL/L (ref 136–145)
TRIGL SERPL-MCNC: 42 MG/DL (ref 0–150)
VLDLC SERPL CALC-MCNC: 11 MG/DL (ref 5–40)

## 2025-08-25 PROCEDURE — 99214 OFFICE O/P EST MOD 30 MIN: CPT | Performed by: INTERNAL MEDICINE

## (undated) DEVICE — POOLE SUCTION HANDLE: Brand: CARDINAL HEALTH

## (undated) DEVICE — TBG ARTHSCP PT W CONN/REDUC 8FT

## (undated) DEVICE — DISPOSABLE TOURNIQUET CUFF SINGLE BLADDER, SINGLE PORT AND QUICK CONNECT CONNECTOR: Brand: COLOR CUFF

## (undated) DEVICE — UNDERCAST PADDING: Brand: DEROYAL

## (undated) DEVICE — PRECISION W/10.0MM STOP (9.2 X 0.51 X 18.4MM)

## (undated) DEVICE — 3M™ STERI-STRIP™ COMPOUND BENZOIN TINCTURE 40 BAGS/CARTON 4 CARTONS/CASE C1544: Brand: 3M™ STERI-STRIP™

## (undated) DEVICE — ABL ASP APOLLO RF XL 90D

## (undated) DEVICE — GLV SURG BIOGEL LTX PF 6 1/2

## (undated) DEVICE — PENCL E/S ULTRAVAC TELESCP NOSE HOLSTR 10FT

## (undated) DEVICE — BLD SHAVER BONECUTTER 5MM 13CM

## (undated) DEVICE — BLD DISSCT COOL CUT SJ CRVD 4MM 13CM

## (undated) DEVICE — DRAPE,REIN 53X77,STERILE: Brand: MEDLINE

## (undated) DEVICE — DRSNG WND GZ CURAD OIL EMULSION 3X3IN STRL

## (undated) DEVICE — BNDG ELAS ELITE V/CLOSE 4IN 5YD LF STRL

## (undated) DEVICE — DRP C/ARM 41X74IN

## (undated) DEVICE — SUT ETHLN 3/0 PS1 18IN 1663H

## (undated) DEVICE — ABL APOLLO RF M/PRT 50D

## (undated) DEVICE — TBG ARTHSCP PUMP W CONN/REDUC 8FT

## (undated) DEVICE — BNDG ELAS CO-FLEX SLF ADHR 6IN 5YD LF STRL

## (undated) DEVICE — UNDYED BRAIDED (POLYGLACTIN 910), SYNTHETIC ABSORBABLE SUTURE: Brand: COATED VICRYL

## (undated) DEVICE — PK ACL 40

## (undated) DEVICE — SKIN PREP TRAY W/CHG: Brand: MEDLINE INDUSTRIES, INC.

## (undated) DEVICE — TUBING, SUCTION, 1/4" X 20', STRAIGHT: Brand: MEDLINE INDUSTRIES, INC.

## (undated) DEVICE — ACL GRAFT KNIFE 10MM

## (undated) DEVICE — KT ACL TRANSTIB WO/ SAWBLD

## (undated) DEVICE — REAMR LP 10MM  STRL

## (undated) DEVICE — TRAP FLD MINIVAC MEGADYNE 100ML

## (undated) DEVICE — PK ARTHSCP 40

## (undated) DEVICE — PATIENT RETURN ELECTRODE, SINGLE-USE, CONTACT QUALITY MONITORING, ADULT, WITH 9FT CORD, FOR PATIENTS WEIGING OVER 33LBS. (15KG): Brand: MEGADYNE

## (undated) DEVICE — SUT FIBERLOOP NO2 W/STR NDL 20IN BLU
Type: IMPLANTABLE DEVICE | Site: KNEE | Status: NON-FUNCTIONAL
Removed: 2022-01-18

## (undated) DEVICE — SUT VIC 2/0 CT2 27IN J269H

## (undated) DEVICE — SUT FIBERTAPE TW 2 7IN WHT/BLK AR72377T
Type: IMPLANTABLE DEVICE | Site: KNEE | Status: NON-FUNCTIONAL
Removed: 2022-01-18